# Patient Record
Sex: MALE | Race: WHITE | NOT HISPANIC OR LATINO | Employment: OTHER | URBAN - METROPOLITAN AREA
[De-identification: names, ages, dates, MRNs, and addresses within clinical notes are randomized per-mention and may not be internally consistent; named-entity substitution may affect disease eponyms.]

---

## 2017-01-01 ENCOUNTER — ANESTHESIA EVENT (OUTPATIENT)
Dept: PERIOP | Facility: HOSPITAL | Age: 77
End: 2017-01-01

## 2017-01-01 ENCOUNTER — APPOINTMENT (OUTPATIENT)
Dept: GENERAL RADIOLOGY | Facility: HOSPITAL | Age: 77
End: 2017-01-01

## 2017-01-01 ENCOUNTER — APPOINTMENT (OUTPATIENT)
Dept: CARDIOLOGY | Facility: HOSPITAL | Age: 77
End: 2017-01-01
Attending: INTERNAL MEDICINE

## 2017-01-01 ENCOUNTER — ANESTHESIA (OUTPATIENT)
Dept: PERIOP | Facility: HOSPITAL | Age: 77
End: 2017-01-01

## 2017-01-01 ENCOUNTER — APPOINTMENT (OUTPATIENT)
Dept: CT IMAGING | Facility: HOSPITAL | Age: 77
End: 2017-01-01

## 2017-01-01 ENCOUNTER — APPOINTMENT (OUTPATIENT)
Dept: NUCLEAR MEDICINE | Facility: HOSPITAL | Age: 77
End: 2017-01-01

## 2017-01-01 ENCOUNTER — HOSPITAL ENCOUNTER (EMERGENCY)
Facility: HOSPITAL | Age: 77
Discharge: HOME | End: 2017-08-05
Attending: EMERGENCY MEDICINE | Admitting: EMERGENCY MEDICINE

## 2017-01-01 ENCOUNTER — HOSPITAL ENCOUNTER (EMERGENCY)
Facility: HOSPITAL | Age: 77
Discharge: HOME | End: 2017-07-23
Attending: EMERGENCY MEDICINE | Admitting: EMERGENCY MEDICINE

## 2017-01-01 ENCOUNTER — HOSPITAL ENCOUNTER (INPATIENT)
Facility: HOSPITAL | Age: 77
LOS: 4 days | End: 2017-11-08
Attending: INTERNAL MEDICINE | Admitting: INTERNAL MEDICINE

## 2017-01-01 ENCOUNTER — HOSPITAL ENCOUNTER (INPATIENT)
Facility: HOSPITAL | Age: 77
LOS: 3 days | Discharge: NURSING, REHAB OR LONG-TERM CARE | End: 2017-08-15
Attending: EMERGENCY MEDICINE | Admitting: INTERNAL MEDICINE

## 2017-01-01 ENCOUNTER — HOSPITAL ENCOUNTER (EMERGENCY)
Facility: HOSPITAL | Age: 77
Discharge: ANOTHER HOSPITAL | End: 2017-08-06
Attending: EMERGENCY MEDICINE | Admitting: EMERGENCY MEDICINE

## 2017-01-01 ENCOUNTER — HOSPITAL ENCOUNTER (EMERGENCY)
Facility: HOSPITAL | Age: 77
Discharge: PSYCHIATRIC OR SUBSTANCE USE FACILITY | End: 2017-09-09
Attending: EMERGENCY MEDICINE | Admitting: EMERGENCY MEDICINE

## 2017-01-01 ENCOUNTER — APPOINTMENT (OUTPATIENT)
Dept: GENERAL RADIOLOGY | Facility: HOSPITAL | Age: 77
End: 2017-01-01
Attending: ORTHOPAEDIC SURGERY

## 2017-01-01 ENCOUNTER — HOSPITAL ENCOUNTER (INPATIENT)
Facility: HOSPITAL | Age: 77
LOS: 7 days | End: 2017-11-04
Attending: EMERGENCY MEDICINE | Admitting: INTERNAL MEDICINE

## 2017-01-01 ENCOUNTER — HOSPITAL ENCOUNTER (INPATIENT)
Facility: HOSPITAL | Age: 77
LOS: 10 days | Discharge: NURSING, REHAB OR LONG-TERM CARE | End: 2017-10-18
Attending: EMERGENCY MEDICINE | Admitting: HOSPITALIST

## 2017-01-01 ENCOUNTER — HOSPITAL ENCOUNTER (EMERGENCY)
Facility: HOSPITAL | Age: 77
Discharge: NURSING, REHAB OR LONG-TERM CARE | End: 2017-08-03
Attending: EMERGENCY MEDICINE | Admitting: EMERGENCY MEDICINE

## 2017-01-01 ENCOUNTER — HOSPITAL ENCOUNTER (EMERGENCY)
Facility: HOSPITAL | Age: 77
Discharge: HOME | End: 2017-07-25
Attending: EMERGENCY MEDICINE | Admitting: EMERGENCY MEDICINE

## 2017-01-01 VITALS
HEIGHT: 70 IN | RESPIRATION RATE: 18 BRPM | BODY MASS INDEX: 28.63 KG/M2 | HEART RATE: 95 BPM | SYSTOLIC BLOOD PRESSURE: 150 MMHG | OXYGEN SATURATION: 98 % | WEIGHT: 200 LBS | DIASTOLIC BLOOD PRESSURE: 106 MMHG | TEMPERATURE: 97.5 F

## 2017-01-01 VITALS
TEMPERATURE: 98 F | HEART RATE: 82 BPM | OXYGEN SATURATION: 84 % | SYSTOLIC BLOOD PRESSURE: 119 MMHG | BODY MASS INDEX: 24.66 KG/M2 | DIASTOLIC BLOOD PRESSURE: 78 MMHG | WEIGHT: 182.1 LBS | HEIGHT: 72 IN | RESPIRATION RATE: 16 BRPM

## 2017-01-01 VITALS
TEMPERATURE: 98.3 F | WEIGHT: 190 LBS | HEIGHT: 71 IN | BODY MASS INDEX: 26.6 KG/M2 | DIASTOLIC BLOOD PRESSURE: 79 MMHG | RESPIRATION RATE: 16 BRPM | OXYGEN SATURATION: 100 % | HEART RATE: 102 BPM | SYSTOLIC BLOOD PRESSURE: 137 MMHG

## 2017-01-01 VITALS
SYSTOLIC BLOOD PRESSURE: 149 MMHG | RESPIRATION RATE: 16 BRPM | OXYGEN SATURATION: 97 % | BODY MASS INDEX: 25.98 KG/M2 | HEART RATE: 77 BPM | TEMPERATURE: 98.1 F | HEIGHT: 72 IN | WEIGHT: 191.8 LBS | DIASTOLIC BLOOD PRESSURE: 83 MMHG

## 2017-01-01 VITALS
SYSTOLIC BLOOD PRESSURE: 152 MMHG | OXYGEN SATURATION: 96 % | HEART RATE: 74 BPM | BODY MASS INDEX: 23.59 KG/M2 | RESPIRATION RATE: 20 BRPM | TEMPERATURE: 97.6 F | DIASTOLIC BLOOD PRESSURE: 97 MMHG | HEIGHT: 72 IN | WEIGHT: 174.16 LBS

## 2017-01-01 VITALS
HEART RATE: 67 BPM | DIASTOLIC BLOOD PRESSURE: 82 MMHG | BODY MASS INDEX: 28.63 KG/M2 | SYSTOLIC BLOOD PRESSURE: 103 MMHG | HEIGHT: 70 IN | RESPIRATION RATE: 16 BRPM | OXYGEN SATURATION: 100 % | TEMPERATURE: 96.9 F | WEIGHT: 200 LBS

## 2017-01-01 VITALS
HEIGHT: 70 IN | DIASTOLIC BLOOD PRESSURE: 89 MMHG | HEART RATE: 90 BPM | SYSTOLIC BLOOD PRESSURE: 129 MMHG | BODY MASS INDEX: 28.63 KG/M2 | OXYGEN SATURATION: 97 % | WEIGHT: 200 LBS | TEMPERATURE: 97.9 F | RESPIRATION RATE: 18 BRPM

## 2017-01-01 VITALS
TEMPERATURE: 99.1 F | HEIGHT: 72 IN | OXYGEN SATURATION: 30 % | SYSTOLIC BLOOD PRESSURE: 81 MMHG | DIASTOLIC BLOOD PRESSURE: 45 MMHG

## 2017-01-01 VITALS
OXYGEN SATURATION: 94 % | TEMPERATURE: 97 F | RESPIRATION RATE: 16 BRPM | HEART RATE: 72 BPM | WEIGHT: 200 LBS | SYSTOLIC BLOOD PRESSURE: 126 MMHG | HEIGHT: 68 IN | DIASTOLIC BLOOD PRESSURE: 87 MMHG | BODY MASS INDEX: 30.31 KG/M2

## 2017-01-01 VITALS
SYSTOLIC BLOOD PRESSURE: 135 MMHG | DIASTOLIC BLOOD PRESSURE: 96 MMHG | WEIGHT: 200 LBS | RESPIRATION RATE: 16 BRPM | BODY MASS INDEX: 30.31 KG/M2 | TEMPERATURE: 96.7 F | HEIGHT: 68 IN | HEART RATE: 81 BPM | OXYGEN SATURATION: 98 %

## 2017-01-01 VITALS
OXYGEN SATURATION: 96 % | DIASTOLIC BLOOD PRESSURE: 95 MMHG | HEART RATE: 77 BPM | SYSTOLIC BLOOD PRESSURE: 145 MMHG | RESPIRATION RATE: 18 BRPM | TEMPERATURE: 97.7 F

## 2017-01-01 DIAGNOSIS — N39.0 URINARY TRACT INFECTION WITHOUT HEMATURIA, SITE UNSPECIFIED: ICD-10-CM

## 2017-01-01 DIAGNOSIS — S41.112A SKIN TEAR OF UPPER ARM WITHOUT COMPLICATION, LEFT, INITIAL ENCOUNTER: ICD-10-CM

## 2017-01-01 DIAGNOSIS — T07.XXXA MULTIPLE CONTUSIONS: ICD-10-CM
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
## 2017-01-01 DIAGNOSIS — F03.91 DEMENTIA WITH BEHAVIORAL DISTURBANCE, UNSPECIFIED DEMENTIA TYPE: Primary | ICD-10-CM

## 2017-01-01 DIAGNOSIS — N28.9 RENAL INSUFFICIENCY: ICD-10-CM

## 2017-01-01 DIAGNOSIS — J18.9 PNEUMONIA OF RIGHT LUNG DUE TO INFECTIOUS ORGANISM, UNSPECIFIED PART OF LUNG: ICD-10-CM

## 2017-01-01 DIAGNOSIS — A41.9 SEPSIS, DUE TO UNSPECIFIED ORGANISM: Primary | ICD-10-CM

## 2017-01-01 DIAGNOSIS — D62 ACUTE BLOOD LOSS ANEMIA: ICD-10-CM

## 2017-01-01 DIAGNOSIS — K92.2 ACUTE GI BLEEDING: Primary | ICD-10-CM

## 2017-01-01 DIAGNOSIS — R26.2 DIFFICULTY WALKING: ICD-10-CM

## 2017-01-01 DIAGNOSIS — S72.001A CLOSED FRACTURE OF RIGHT HIP, INITIAL ENCOUNTER (HCC): Primary | ICD-10-CM

## 2017-01-01 DIAGNOSIS — I48.20 ATRIAL FIBRILLATION, CHRONIC (HCC): ICD-10-CM

## 2017-01-01 DIAGNOSIS — I95.9 HYPOTENSION, UNSPECIFIED HYPOTENSION TYPE: ICD-10-CM
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
## 2017-01-01 DIAGNOSIS — F03.918: Primary | ICD-10-CM

## 2017-01-01 DIAGNOSIS — M54.6 THORACIC BACK PAIN, UNSPECIFIED BACK PAIN LATERALITY, UNSPECIFIED CHRONICITY: Primary | ICD-10-CM

## 2017-01-01 DIAGNOSIS — S61.511A TEAR OF SKIN OF RIGHT WRIST, INITIAL ENCOUNTER: ICD-10-CM

## 2017-01-01 DIAGNOSIS — S09.90XA CLOSED HEAD INJURY, INITIAL ENCOUNTER: ICD-10-CM

## 2017-01-01 LAB
ABO + RH BLD: NORMAL
ABO GROUP BLD: NORMAL
ALBUMIN SERPL-MCNC: 2.5 G/DL (ref 3.5–5.2)
ALBUMIN SERPL-MCNC: 2.6 G/DL (ref 3.5–5.2)
ALBUMIN SERPL-MCNC: 2.7 G/DL (ref 3.5–5.2)
ALBUMIN SERPL-MCNC: 3 G/DL (ref 3.5–5.2)
ALBUMIN SERPL-MCNC: 3.2 G/DL (ref 3.5–5.2)
ALBUMIN SERPL-MCNC: 3.4 G/DL (ref 3.5–5.2)
ALBUMIN SERPL-MCNC: 3.4 G/DL (ref 3.5–5.2)
ALBUMIN SERPL-MCNC: 3.6 G/DL (ref 3.5–5.2)
ALBUMIN/GLOB SERPL: 0.8 G/DL
ALBUMIN/GLOB SERPL: 0.9 G/DL
ALBUMIN/GLOB SERPL: 1 G/DL
ALBUMIN/GLOB SERPL: 1.1 G/DL
ALP SERPL-CCNC: 101 U/L (ref 39–117)
ALP SERPL-CCNC: 101 U/L (ref 39–117)
ALP SERPL-CCNC: 65 U/L (ref 39–117)
ALP SERPL-CCNC: 67 U/L (ref 39–117)
ALP SERPL-CCNC: 72 U/L (ref 39–117)
ALP SERPL-CCNC: 83 U/L (ref 39–117)
ALP SERPL-CCNC: 87 U/L (ref 39–117)
ALP SERPL-CCNC: 94 U/L (ref 39–117)
ALT SERPL W P-5'-P-CCNC: 10 U/L (ref 1–41)
ALT SERPL W P-5'-P-CCNC: 13 U/L (ref 1–41)
ALT SERPL W P-5'-P-CCNC: 14 U/L (ref 1–41)
ALT SERPL W P-5'-P-CCNC: 15 U/L (ref 1–41)
ALT SERPL W P-5'-P-CCNC: 18 U/L (ref 1–41)
ALT SERPL W P-5'-P-CCNC: 7 U/L (ref 1–41)
ALT SERPL W P-5'-P-CCNC: 8 U/L (ref 1–41)
ALT SERPL W P-5'-P-CCNC: 9 U/L (ref 1–41)
AMPHET+METHAMPHET UR QL: NEGATIVE
AMPHET+METHAMPHET UR QL: NEGATIVE
ANION GAP SERPL CALCULATED.3IONS-SCNC: 10.2 MMOL/L
ANION GAP SERPL CALCULATED.3IONS-SCNC: 10.2 MMOL/L
ANION GAP SERPL CALCULATED.3IONS-SCNC: 10.3 MMOL/L
ANION GAP SERPL CALCULATED.3IONS-SCNC: 10.5 MMOL/L
ANION GAP SERPL CALCULATED.3IONS-SCNC: 10.7 MMOL/L
ANION GAP SERPL CALCULATED.3IONS-SCNC: 11.2 MMOL/L
ANION GAP SERPL CALCULATED.3IONS-SCNC: 11.2 MMOL/L
ANION GAP SERPL CALCULATED.3IONS-SCNC: 11.4 MMOL/L
ANION GAP SERPL CALCULATED.3IONS-SCNC: 11.5 MMOL/L
ANION GAP SERPL CALCULATED.3IONS-SCNC: 11.6 MMOL/L
ANION GAP SERPL CALCULATED.3IONS-SCNC: 11.6 MMOL/L
ANION GAP SERPL CALCULATED.3IONS-SCNC: 11.7 MMOL/L
ANION GAP SERPL CALCULATED.3IONS-SCNC: 11.9 MMOL/L
ANION GAP SERPL CALCULATED.3IONS-SCNC: 12 MMOL/L
ANION GAP SERPL CALCULATED.3IONS-SCNC: 12.4 MMOL/L
ANION GAP SERPL CALCULATED.3IONS-SCNC: 13.1 MMOL/L
ANION GAP SERPL CALCULATED.3IONS-SCNC: 13.7 MMOL/L
ANION GAP SERPL CALCULATED.3IONS-SCNC: 14.2 MMOL/L
ANION GAP SERPL CALCULATED.3IONS-SCNC: 15.4 MMOL/L
ANION GAP SERPL CALCULATED.3IONS-SCNC: 7.7 MMOL/L
ANION GAP SERPL CALCULATED.3IONS-SCNC: 9 MMOL/L
ANION GAP SERPL CALCULATED.3IONS-SCNC: 9.7 MMOL/L
ANISOCYTOSIS BLD QL: NORMAL
AORTIC ARCH: 2 CM
APTT PPP: 32.8 SECONDS (ref 22.7–35.4)
APTT PPP: 32.8 SECONDS (ref 22.7–35.4)
ASCENDING AORTA: 3.3 CM
AST SERPL-CCNC: 11 U/L (ref 1–40)
AST SERPL-CCNC: 14 U/L (ref 1–40)
AST SERPL-CCNC: 14 U/L (ref 1–40)
AST SERPL-CCNC: 15 U/L (ref 1–40)
AST SERPL-CCNC: 15 U/L (ref 1–40)
AST SERPL-CCNC: 16 U/L (ref 1–40)
AST SERPL-CCNC: 16 U/L (ref 1–40)
AST SERPL-CCNC: 9 U/L (ref 1–40)
BACTERIA SPEC AEROBE CULT: ABNORMAL
BACTERIA SPEC AEROBE CULT: NO GROWTH
BACTERIA SPEC AEROBE CULT: NORMAL
BACTERIA SPEC AEROBE CULT: NORMAL
BACTERIA UR QL AUTO: ABNORMAL /HPF
BARBITURATES UR QL SCN: NEGATIVE
BARBITURATES UR QL SCN: NEGATIVE
BASOPHILS # BLD AUTO: 0.02 10*3/MM3 (ref 0–0.2)
BASOPHILS # BLD AUTO: 0.03 10*3/MM3 (ref 0–0.2)
BASOPHILS # BLD AUTO: 0.04 10*3/MM3 (ref 0–0.2)
BASOPHILS # BLD AUTO: 0.05 10*3/MM3 (ref 0–0.2)
BASOPHILS # BLD AUTO: 0.06 10*3/MM3 (ref 0–0.2)
BASOPHILS # BLD AUTO: 0.06 10*3/MM3 (ref 0–0.2)
BASOPHILS # BLD AUTO: 0.07 10*3/MM3 (ref 0–0.2)
BASOPHILS NFR BLD AUTO: 0.4 % (ref 0–1.5)
BASOPHILS NFR BLD AUTO: 0.5 % (ref 0–1.5)
BASOPHILS NFR BLD AUTO: 0.5 % (ref 0–1.5)
BASOPHILS NFR BLD AUTO: 0.6 % (ref 0–1.5)
BASOPHILS NFR BLD AUTO: 0.7 % (ref 0–1.5)
BASOPHILS NFR BLD AUTO: 0.7 % (ref 0–1.5)
BASOPHILS NFR BLD AUTO: 0.8 % (ref 0–1.5)
BASOPHILS NFR BLD AUTO: 0.9 % (ref 0–1.5)
BASOPHILS NFR BLD AUTO: 1.1 % (ref 0–1.5)
BASOPHILS NFR BLD AUTO: 1.1 % (ref 0–1.5)
BASOPHILS NFR BLD AUTO: 1.2 % (ref 0–1.5)
BENZODIAZ UR QL SCN: NEGATIVE
BENZODIAZ UR QL SCN: NEGATIVE
BH BB BLOOD EXPIRATION DATE: NORMAL
BH BB BLOOD TYPE BARCODE: 6200
BH BB DISPENSE STATUS: NORMAL
BH BB PRODUCT CODE: NORMAL
BH BB UNIT NUMBER: NORMAL
BH CV ECHO MEAS - ACS: 2.1 CM
BH CV ECHO MEAS - AO MAX PG: 9 MMHG
BH CV ECHO MEAS - AO MEAN PG (FULL): 1 MMHG
BH CV ECHO MEAS - AO MEAN PG: 4 MMHG
BH CV ECHO MEAS - AO ROOT AREA (BSA CORRECTED): 1.7
BH CV ECHO MEAS - AO ROOT AREA: 9.6 CM^2
BH CV ECHO MEAS - AO ROOT DIAM: 3.5 CM
BH CV ECHO MEAS - AO V2 MAX: 150 CM/SEC
BH CV ECHO MEAS - AO V2 MEAN: 97.5 CM/SEC
BH CV ECHO MEAS - AO V2 VTI: 25.3 CM
BH CV ECHO MEAS - ASC AORTA: 3.3 CM
BH CV ECHO MEAS - AVA(I,A): 2.8 CM^2
BH CV ECHO MEAS - AVA(I,D): 2.8 CM^2
BH CV ECHO MEAS - BSA(HAYCOCK): 2.1 M^2
BH CV ECHO MEAS - BSA: 2.1 M^2
BH CV ECHO MEAS - BZI_BMI: 26 KILOGRAMS/M^2
BH CV ECHO MEAS - BZI_METRIC_HEIGHT: 182.9 CM
BH CV ECHO MEAS - BZI_METRIC_WEIGHT: 87.1 KG
BH CV ECHO MEAS - CONTRAST EF (2CH): 62.2 ML/M^2
BH CV ECHO MEAS - CONTRAST EF 4CH: 59.6 ML/M^2
BH CV ECHO MEAS - EDV(CUBED): 103.8 ML
BH CV ECHO MEAS - EDV(MOD-SP2): 98 ML
BH CV ECHO MEAS - EDV(MOD-SP4): 94 ML
BH CV ECHO MEAS - EDV(TEICH): 102.4 ML
BH CV ECHO MEAS - EF(CUBED): 74 %
BH CV ECHO MEAS - EF(MOD-SP2): 62.2 %
BH CV ECHO MEAS - EF(MOD-SP4): 59.6 %
BH CV ECHO MEAS - EF(TEICH): 65.8 %
BH CV ECHO MEAS - ESV(CUBED): 27 ML
BH CV ECHO MEAS - ESV(MOD-SP2): 37 ML
BH CV ECHO MEAS - ESV(MOD-SP4): 38 ML
BH CV ECHO MEAS - ESV(TEICH): 35 ML
BH CV ECHO MEAS - FS: 36.2 %
BH CV ECHO MEAS - IVS/LVPW: 1.2
BH CV ECHO MEAS - IVSD: 1.3 CM
BH CV ECHO MEAS - LAT PEAK E' VEL: 8 CM/SEC
BH CV ECHO MEAS - LV DIASTOLIC VOL/BSA (35-75): 44.9 ML/M^2
BH CV ECHO MEAS - LV MASS(C)D: 212 GRAMS
BH CV ECHO MEAS - LV MASS(C)DI: 101.3 GRAMS/M^2
BH CV ECHO MEAS - LV MAX PG: 6 MMHG
BH CV ECHO MEAS - LV MEAN PG: 3 MMHG
BH CV ECHO MEAS - LV SYSTOLIC VOL/BSA (12-30): 18.1 ML/M^2
BH CV ECHO MEAS - LV V1 MAX: 119 CM/SEC
BH CV ECHO MEAS - LV V1 MEAN: 73.3 CM/SEC
BH CV ECHO MEAS - LV V1 VTI: 16.8 CM
BH CV ECHO MEAS - LVIDD: 4.7 CM
BH CV ECHO MEAS - LVIDS: 3 CM
BH CV ECHO MEAS - LVLD AP2: 7.6 CM
BH CV ECHO MEAS - LVLD AP4: 7.7 CM
BH CV ECHO MEAS - LVLS AP2: 6.8 CM
BH CV ECHO MEAS - LVLS AP4: 7.1 CM
BH CV ECHO MEAS - LVOT AREA (M): 4.2 CM^2
BH CV ECHO MEAS - LVOT AREA: 4.2 CM^2
BH CV ECHO MEAS - LVOT DIAM: 2.3 CM
BH CV ECHO MEAS - LVPWD: 1.1 CM
BH CV ECHO MEAS - MED PEAK E' VEL: 6 CM/SEC
BH CV ECHO MEAS - MV A DUR: 0.12 SEC
BH CV ECHO MEAS - MV A MAX VEL: 72.1 CM/SEC
BH CV ECHO MEAS - MV DEC SLOPE: 737 CM/SEC^2
BH CV ECHO MEAS - MV DEC TIME: 0.19 SEC
BH CV ECHO MEAS - MV E MAX VEL: 163 CM/SEC
BH CV ECHO MEAS - MV E/A: 2.3
BH CV ECHO MEAS - MV MAX PG: 11 MMHG
BH CV ECHO MEAS - MV MEAN PG: 4 MMHG
BH CV ECHO MEAS - MV P1/2T MAX VEL: 174 CM/SEC
BH CV ECHO MEAS - MV P1/2T: 69.1 MSEC
BH CV ECHO MEAS - MV V2 MEAN: 94.7 CM/SEC
BH CV ECHO MEAS - MV V2 VTI: 33.8 CM
BH CV ECHO MEAS - MVA P1/2T LCG: 1.3 CM^2
BH CV ECHO MEAS - MVA(P1/2T): 3.2 CM^2
BH CV ECHO MEAS - MVA(VTI): 2.1 CM^2
BH CV ECHO MEAS - PA ACC SLOPE: 36.7 CM/SEC^2
BH CV ECHO MEAS - PA ACC TIME: 0.07 SEC
BH CV ECHO MEAS - PA MAX PG (FULL): 2.5 MMHG
BH CV ECHO MEAS - PA MAX PG: 5.2 MMHG
BH CV ECHO MEAS - PA PR(ACCEL): 45.7 MMHG
BH CV ECHO MEAS - PA V2 MAX: 114 CM/SEC
BH CV ECHO MEAS - PULM DIAS VEL: 94.4 CM/SEC
BH CV ECHO MEAS - PULM S/D: 0.48
BH CV ECHO MEAS - PULM SYS VEL: 45.5 CM/SEC
BH CV ECHO MEAS - PVA(V,A): 3.5 CM^2
BH CV ECHO MEAS - PVA(V,D): 3.5 CM^2
BH CV ECHO MEAS - QP/QS: 1
BH CV ECHO MEAS - RAP SYSTOLE: 3 MMHG
BH CV ECHO MEAS - RV MAX PG: 2.7 MMHG
BH CV ECHO MEAS - RV MEAN PG: 2 MMHG
BH CV ECHO MEAS - RV V1 MAX: 82 CM/SEC
BH CV ECHO MEAS - RV V1 MEAN: 58 CM/SEC
BH CV ECHO MEAS - RV V1 VTI: 14.3 CM
BH CV ECHO MEAS - RVOT AREA: 4.9 CM^2
BH CV ECHO MEAS - RVOT DIAM: 2.5 CM
BH CV ECHO MEAS - RVSP: 34 MMHG
BH CV ECHO MEAS - SI(AO): 116.3 ML/M^2
BH CV ECHO MEAS - SI(CUBED): 36.7 ML/M^2
BH CV ECHO MEAS - SI(LVOT): 33.3 ML/M^2
BH CV ECHO MEAS - SI(MOD-SP2): 29.1 ML/M^2
BH CV ECHO MEAS - SI(MOD-SP4): 26.7 ML/M^2
BH CV ECHO MEAS - SI(TEICH): 32.2 ML/M^2
BH CV ECHO MEAS - SUP REN AO DIAM: 1.9 CM
BH CV ECHO MEAS - SV(AO): 243.4 ML
BH CV ECHO MEAS - SV(CUBED): 76.8 ML
BH CV ECHO MEAS - SV(LVOT): 69.8 ML
BH CV ECHO MEAS - SV(MOD-SP2): 61 ML
BH CV ECHO MEAS - SV(MOD-SP4): 56 ML
BH CV ECHO MEAS - SV(RVOT): 70.2 ML
BH CV ECHO MEAS - SV(TEICH): 67.4 ML
BH CV ECHO MEAS - TAPSE (>1.6): 1.8 CM2
BH CV VAS BP RIGHT ARM: NORMAL MMHG
BH CV XLRA - RV BASE: 3.3 CM
BH CV XLRA - TDI S': 19 CM/SEC
BILIRUB SERPL-MCNC: 0.2 MG/DL (ref 0.1–1.2)
BILIRUB SERPL-MCNC: 0.3 MG/DL (ref 0.1–1.2)
BILIRUB SERPL-MCNC: 0.4 MG/DL (ref 0.1–1.2)
BILIRUB UR QL STRIP: NEGATIVE
BLD GP AB SCN SERPL QL: NEGATIVE
BUN BLD-MCNC: 14 MG/DL (ref 8–23)
BUN BLD-MCNC: 14 MG/DL (ref 8–23)
BUN BLD-MCNC: 15 MG/DL (ref 8–23)
BUN BLD-MCNC: 15 MG/DL (ref 8–23)
BUN BLD-MCNC: 16 MG/DL (ref 8–23)
BUN BLD-MCNC: 16 MG/DL (ref 8–23)
BUN BLD-MCNC: 17 MG/DL (ref 8–23)
BUN BLD-MCNC: 19 MG/DL (ref 8–23)
BUN BLD-MCNC: 20 MG/DL (ref 8–23)
BUN BLD-MCNC: 21 MG/DL (ref 8–23)
BUN BLD-MCNC: 22 MG/DL (ref 8–23)
BUN BLD-MCNC: 23 MG/DL (ref 8–23)
BUN BLD-MCNC: 23 MG/DL (ref 8–23)
BUN BLD-MCNC: 26 MG/DL (ref 8–23)
BUN BLD-MCNC: 27 MG/DL (ref 8–23)
BUN BLD-MCNC: 28 MG/DL (ref 8–23)
BUN BLD-MCNC: 31 MG/DL (ref 8–23)
BUN BLD-MCNC: 33 MG/DL (ref 8–23)
BUN BLD-MCNC: 33 MG/DL (ref 8–23)
BUN BLD-MCNC: 36 MG/DL (ref 8–23)
BUN/CREAT SERPL: 10.1 (ref 7–25)
BUN/CREAT SERPL: 10.1 (ref 7–25)
BUN/CREAT SERPL: 11.3 (ref 7–25)
BUN/CREAT SERPL: 12.1 (ref 7–25)
BUN/CREAT SERPL: 12.4 (ref 7–25)
BUN/CREAT SERPL: 12.9 (ref 7–25)
BUN/CREAT SERPL: 13.3 (ref 7–25)
BUN/CREAT SERPL: 14 (ref 7–25)
BUN/CREAT SERPL: 14.1 (ref 7–25)
BUN/CREAT SERPL: 14.7 (ref 7–25)
BUN/CREAT SERPL: 15.3 (ref 7–25)
BUN/CREAT SERPL: 17.2 (ref 7–25)
BUN/CREAT SERPL: 18.3 (ref 7–25)
BUN/CREAT SERPL: 18.6 (ref 7–25)
BUN/CREAT SERPL: 18.8 (ref 7–25)
BUN/CREAT SERPL: 19.2 (ref 7–25)
BUN/CREAT SERPL: 19.9 (ref 7–25)
BUN/CREAT SERPL: 21.9 (ref 7–25)
BUN/CREAT SERPL: 22 (ref 7–25)
BUN/CREAT SERPL: 22.1 (ref 7–25)
BUN/CREAT SERPL: 23.6 (ref 7–25)
BUN/CREAT SERPL: 24.1 (ref 7–25)
CALCIUM SPEC-SCNC: 10 MG/DL (ref 8.6–10.5)
CALCIUM SPEC-SCNC: 10 MG/DL (ref 8.6–10.5)
CALCIUM SPEC-SCNC: 10.1 MG/DL (ref 8.6–10.5)
CALCIUM SPEC-SCNC: 10.2 MG/DL (ref 8.6–10.5)
CALCIUM SPEC-SCNC: 10.4 MG/DL (ref 8.6–10.5)
CALCIUM SPEC-SCNC: 10.5 MG/DL (ref 8.6–10.5)
CALCIUM SPEC-SCNC: 10.8 MG/DL (ref 8.6–10.5)
CALCIUM SPEC-SCNC: 8.5 MG/DL (ref 8.6–10.5)
CALCIUM SPEC-SCNC: 8.7 MG/DL (ref 8.6–10.5)
CALCIUM SPEC-SCNC: 8.9 MG/DL (ref 8.6–10.5)
CALCIUM SPEC-SCNC: 9.2 MG/DL (ref 8.6–10.5)
CALCIUM SPEC-SCNC: 9.2 MG/DL (ref 8.6–10.5)
CALCIUM SPEC-SCNC: 9.3 MG/DL (ref 8.6–10.5)
CALCIUM SPEC-SCNC: 9.3 MG/DL (ref 8.6–10.5)
CALCIUM SPEC-SCNC: 9.4 MG/DL (ref 8.6–10.5)
CALCIUM SPEC-SCNC: 9.6 MG/DL (ref 8.6–10.5)
CALCIUM SPEC-SCNC: 9.7 MG/DL (ref 8.6–10.5)
CALCIUM SPEC-SCNC: 9.8 MG/DL (ref 8.6–10.5)
CALCIUM SPEC-SCNC: 9.8 MG/DL (ref 8.6–10.5)
CALCIUM SPEC-SCNC: 9.9 MG/DL (ref 8.6–10.5)
CANNABINOIDS SERPL QL: NEGATIVE
CANNABINOIDS SERPL QL: NEGATIVE
CEA SERPL-MCNC: 13.3 NG/ML
CHLORIDE SERPL-SCNC: 104 MMOL/L (ref 98–107)
CHLORIDE SERPL-SCNC: 105 MMOL/L (ref 98–107)
CHLORIDE SERPL-SCNC: 105 MMOL/L (ref 98–107)
CHLORIDE SERPL-SCNC: 106 MMOL/L (ref 98–107)
CHLORIDE SERPL-SCNC: 107 MMOL/L (ref 98–107)
CHLORIDE SERPL-SCNC: 107 MMOL/L (ref 98–107)
CHLORIDE SERPL-SCNC: 108 MMOL/L (ref 98–107)
CHLORIDE SERPL-SCNC: 109 MMOL/L (ref 98–107)
CHLORIDE SERPL-SCNC: 110 MMOL/L (ref 98–107)
CHLORIDE SERPL-SCNC: 111 MMOL/L (ref 98–107)
CHLORIDE SERPL-SCNC: 112 MMOL/L (ref 98–107)
CHLORIDE SERPL-SCNC: 114 MMOL/L (ref 98–107)
CHLORIDE SERPL-SCNC: 115 MMOL/L (ref 98–107)
CHLORIDE SERPL-SCNC: 115 MMOL/L (ref 98–107)
CHLORIDE SERPL-SCNC: 116 MMOL/L (ref 98–107)
CHLORIDE SERPL-SCNC: 117 MMOL/L (ref 98–107)
CHLORIDE SERPL-SCNC: 117 MMOL/L (ref 98–107)
CK SERPL-CCNC: 30 U/L (ref 20–200)
CLARITY UR: ABNORMAL
CLARITY UR: ABNORMAL
CLARITY UR: CLEAR
CLARITY UR: CLEAR
CO2 SERPL-SCNC: 17.8 MMOL/L (ref 22–29)
CO2 SERPL-SCNC: 18.5 MMOL/L (ref 22–29)
CO2 SERPL-SCNC: 19 MMOL/L (ref 22–29)
CO2 SERPL-SCNC: 19.4 MMOL/L (ref 22–29)
CO2 SERPL-SCNC: 19.7 MMOL/L (ref 22–29)
CO2 SERPL-SCNC: 19.8 MMOL/L (ref 22–29)
CO2 SERPL-SCNC: 20.3 MMOL/L (ref 22–29)
CO2 SERPL-SCNC: 20.5 MMOL/L (ref 22–29)
CO2 SERPL-SCNC: 20.6 MMOL/L (ref 22–29)
CO2 SERPL-SCNC: 21.1 MMOL/L (ref 22–29)
CO2 SERPL-SCNC: 21.3 MMOL/L (ref 22–29)
CO2 SERPL-SCNC: 21.3 MMOL/L (ref 22–29)
CO2 SERPL-SCNC: 22.4 MMOL/L (ref 22–29)
CO2 SERPL-SCNC: 22.8 MMOL/L (ref 22–29)
CO2 SERPL-SCNC: 22.9 MMOL/L (ref 22–29)
CO2 SERPL-SCNC: 23.6 MMOL/L (ref 22–29)
CO2 SERPL-SCNC: 23.6 MMOL/L (ref 22–29)
CO2 SERPL-SCNC: 23.8 MMOL/L (ref 22–29)
CO2 SERPL-SCNC: 25 MMOL/L (ref 22–29)
CO2 SERPL-SCNC: 25.3 MMOL/L (ref 22–29)
CO2 SERPL-SCNC: 27.3 MMOL/L (ref 22–29)
CO2 SERPL-SCNC: 28.8 MMOL/L (ref 22–29)
COCAINE UR QL: NEGATIVE
COCAINE UR QL: NEGATIVE
COLOR UR: YELLOW
CREAT BLD-MCNC: 1.05 MG/DL (ref 0.76–1.27)
CREAT BLD-MCNC: 1.09 MG/DL (ref 0.76–1.27)
CREAT BLD-MCNC: 1.1 MG/DL (ref 0.76–1.27)
CREAT BLD-MCNC: 1.18 MG/DL (ref 0.76–1.27)
CREAT BLD-MCNC: 1.21 MG/DL (ref 0.76–1.27)
CREAT BLD-MCNC: 1.24 MG/DL (ref 0.76–1.27)
CREAT BLD-MCNC: 1.26 MG/DL (ref 0.76–1.27)
CREAT BLD-MCNC: 1.28 MG/DL (ref 0.76–1.27)
CREAT BLD-MCNC: 1.31 MG/DL (ref 0.76–1.27)
CREAT BLD-MCNC: 1.36 MG/DL (ref 0.76–1.27)
CREAT BLD-MCNC: 1.37 MG/DL (ref 0.76–1.27)
CREAT BLD-MCNC: 1.39 MG/DL (ref 0.76–1.27)
CREAT BLD-MCNC: 1.4 MG/DL (ref 0.76–1.27)
CREAT BLD-MCNC: 1.43 MG/DL (ref 0.76–1.27)
CREAT BLD-MCNC: 1.46 MG/DL (ref 0.76–1.27)
CREAT BLD-MCNC: 1.49 MG/DL (ref 0.76–1.27)
CREAT BLD-MCNC: 1.5 MG/DL (ref 0.76–1.27)
CREAT BLD-MCNC: 1.57 MG/DL (ref 0.76–1.27)
CREAT BLD-MCNC: 1.68 MG/DL (ref 0.76–1.27)
CREAT BLD-MCNC: 1.92 MG/DL (ref 0.76–1.27)
D DIMER PPP FEU-MCNC: 2.38 MCGFEU/ML (ref 0–0.49)
D-LACTATE SERPL-SCNC: 1.5 MMOL/L (ref 0.5–2)
D-LACTATE SERPL-SCNC: 3.1 MMOL/L (ref 0.5–2)
DEPRECATED RDW RBC AUTO: 47.8 FL (ref 37–54)
DEPRECATED RDW RBC AUTO: 47.8 FL (ref 37–54)
DEPRECATED RDW RBC AUTO: 47.9 FL (ref 37–54)
DEPRECATED RDW RBC AUTO: 48 FL (ref 37–54)
DEPRECATED RDW RBC AUTO: 48.5 FL (ref 37–54)
DEPRECATED RDW RBC AUTO: 48.7 FL (ref 37–54)
DEPRECATED RDW RBC AUTO: 49.1 FL (ref 37–54)
DEPRECATED RDW RBC AUTO: 52 FL (ref 37–54)
DEPRECATED RDW RBC AUTO: 52.6 FL (ref 37–54)
DEPRECATED RDW RBC AUTO: 53.5 FL (ref 37–54)
DEPRECATED RDW RBC AUTO: 53.5 FL (ref 37–54)
DEPRECATED RDW RBC AUTO: 53.7 FL (ref 37–54)
DEPRECATED RDW RBC AUTO: 54 FL (ref 37–54)
DEPRECATED RDW RBC AUTO: 54 FL (ref 37–54)
DEPRECATED RDW RBC AUTO: 54.5 FL (ref 37–54)
DEPRECATED RDW RBC AUTO: 54.7 FL (ref 37–54)
DEPRECATED RDW RBC AUTO: 55.1 FL (ref 37–54)
DEPRECATED RDW RBC AUTO: 55.2 FL (ref 37–54)
DEPRECATED RDW RBC AUTO: 56.2 FL (ref 37–54)
DEPRECATED RDW RBC AUTO: 56.6 FL (ref 37–54)
DEPRECATED RDW RBC AUTO: 57.1 FL (ref 37–54)
DEPRECATED RDW RBC AUTO: 57.3 FL (ref 37–54)
DEPRECATED RDW RBC AUTO: 57.3 FL (ref 37–54)
DEPRECATED RDW RBC AUTO: 58.6 FL (ref 37–54)
DEPRECATED RDW RBC AUTO: 60.5 FL (ref 37–54)
E/E' RATIO: 25
EOSINOPHIL # BLD AUTO: 0.02 10*3/MM3 (ref 0–0.7)
EOSINOPHIL # BLD AUTO: 0.04 10*3/MM3 (ref 0–0.7)
EOSINOPHIL # BLD AUTO: 0.05 10*3/MM3 (ref 0–0.7)
EOSINOPHIL # BLD AUTO: 0.05 10*3/MM3 (ref 0–0.7)
EOSINOPHIL # BLD AUTO: 0.06 10*3/MM3 (ref 0–0.7)
EOSINOPHIL # BLD AUTO: 0.06 10*3/MM3 (ref 0–0.7)
EOSINOPHIL # BLD AUTO: 0.07 10*3/MM3 (ref 0–0.7)
EOSINOPHIL # BLD AUTO: 0.11 10*3/MM3 (ref 0–0.7)
EOSINOPHIL # BLD AUTO: 0.13 10*3/MM3 (ref 0–0.7)
EOSINOPHIL # BLD AUTO: 0.14 10*3/MM3 (ref 0–0.7)
EOSINOPHIL # BLD AUTO: 0.15 10*3/MM3 (ref 0–0.7)
EOSINOPHIL # BLD AUTO: 0.16 10*3/MM3 (ref 0–0.7)
EOSINOPHIL # BLD AUTO: 0.17 10*3/MM3 (ref 0–0.7)
EOSINOPHIL # BLD AUTO: 0.2 10*3/MM3 (ref 0–0.7)
EOSINOPHIL NFR BLD AUTO: 0.3 % (ref 0.3–6.2)
EOSINOPHIL NFR BLD AUTO: 0.7 % (ref 0.3–6.2)
EOSINOPHIL NFR BLD AUTO: 0.8 % (ref 0.3–6.2)
EOSINOPHIL NFR BLD AUTO: 0.9 % (ref 0.3–6.2)
EOSINOPHIL NFR BLD AUTO: 1.1 % (ref 0.3–6.2)
EOSINOPHIL NFR BLD AUTO: 1.1 % (ref 0.3–6.2)
EOSINOPHIL NFR BLD AUTO: 1.3 % (ref 0.3–6.2)
EOSINOPHIL NFR BLD AUTO: 1.5 % (ref 0.3–6.2)
EOSINOPHIL NFR BLD AUTO: 2.6 % (ref 0.3–6.2)
EOSINOPHIL NFR BLD AUTO: 2.7 % (ref 0.3–6.2)
EOSINOPHIL NFR BLD AUTO: 3 % (ref 0.3–6.2)
EOSINOPHIL NFR BLD AUTO: 3.2 % (ref 0.3–6.2)
EOSINOPHIL NFR BLD AUTO: 4 % (ref 0.3–6.2)
EOSINOPHIL NFR BLD AUTO: 4.5 % (ref 0.3–6.2)
ERYTHROCYTE [DISTWIDTH] IN BLOOD BY AUTOMATED COUNT: 16.7 % (ref 11.5–14.5)
ERYTHROCYTE [DISTWIDTH] IN BLOOD BY AUTOMATED COUNT: 16.8 % (ref 11.5–14.5)
ERYTHROCYTE [DISTWIDTH] IN BLOOD BY AUTOMATED COUNT: 16.9 % (ref 11.5–14.5)
ERYTHROCYTE [DISTWIDTH] IN BLOOD BY AUTOMATED COUNT: 17.1 % (ref 11.5–14.5)
ERYTHROCYTE [DISTWIDTH] IN BLOOD BY AUTOMATED COUNT: 17.2 % (ref 11.5–14.5)
ERYTHROCYTE [DISTWIDTH] IN BLOOD BY AUTOMATED COUNT: 17.2 % (ref 11.5–14.5)
ERYTHROCYTE [DISTWIDTH] IN BLOOD BY AUTOMATED COUNT: 17.3 % (ref 11.5–14.5)
ERYTHROCYTE [DISTWIDTH] IN BLOOD BY AUTOMATED COUNT: 17.4 % (ref 11.5–14.5)
ERYTHROCYTE [DISTWIDTH] IN BLOOD BY AUTOMATED COUNT: 17.6 % (ref 11.5–14.5)
ERYTHROCYTE [DISTWIDTH] IN BLOOD BY AUTOMATED COUNT: 17.6 % (ref 11.5–14.5)
ERYTHROCYTE [DISTWIDTH] IN BLOOD BY AUTOMATED COUNT: 17.7 % (ref 11.5–14.5)
ERYTHROCYTE [DISTWIDTH] IN BLOOD BY AUTOMATED COUNT: 17.8 % (ref 11.5–14.5)
ERYTHROCYTE [DISTWIDTH] IN BLOOD BY AUTOMATED COUNT: 17.8 % (ref 11.5–14.5)
ERYTHROCYTE [DISTWIDTH] IN BLOOD BY AUTOMATED COUNT: 17.9 % (ref 11.5–14.5)
ERYTHROCYTE [DISTWIDTH] IN BLOOD BY AUTOMATED COUNT: 17.9 % (ref 11.5–14.5)
ERYTHROCYTE [DISTWIDTH] IN BLOOD BY AUTOMATED COUNT: 18.5 % (ref 11.5–14.5)
ERYTHROCYTE [DISTWIDTH] IN BLOOD BY AUTOMATED COUNT: 18.6 % (ref 11.5–14.5)
ERYTHROCYTE [DISTWIDTH] IN BLOOD BY AUTOMATED COUNT: 18.6 % (ref 11.5–14.5)
ERYTHROCYTE [DISTWIDTH] IN BLOOD BY AUTOMATED COUNT: 18.8 % (ref 11.5–14.5)
ERYTHROCYTE [DISTWIDTH] IN BLOOD BY AUTOMATED COUNT: 18.8 % (ref 11.5–14.5)
ERYTHROCYTE [DISTWIDTH] IN BLOOD BY AUTOMATED COUNT: 19 % (ref 11.5–14.5)
ERYTHROCYTE [DISTWIDTH] IN BLOOD BY AUTOMATED COUNT: 19.3 % (ref 11.5–14.5)
ETHANOL BLD-MCNC: <10 MG/DL (ref 0–10)
ETHANOL BLD-MCNC: <10 MG/DL (ref 0–10)
ETHANOL UR QL: <0.01 %
ETHANOL UR QL: <0.01 %
FERRITIN SERPL-MCNC: 30.55 NG/ML (ref 30–400)
FERRITIN SERPL-MCNC: 76.54 NG/ML (ref 30–400)
FOLATE SERPL-MCNC: 13.62 NG/ML (ref 4.78–24.2)
FOLATE SERPL-MCNC: 16.63 NG/ML (ref 4.78–24.2)
GFR SERPL CREATININE-BSD FRML MDRD: 34 ML/MIN/1.73
GFR SERPL CREATININE-BSD FRML MDRD: 40 ML/MIN/1.73
GFR SERPL CREATININE-BSD FRML MDRD: 43 ML/MIN/1.73
GFR SERPL CREATININE-BSD FRML MDRD: 45 ML/MIN/1.73
GFR SERPL CREATININE-BSD FRML MDRD: 45 ML/MIN/1.73
GFR SERPL CREATININE-BSD FRML MDRD: 47 ML/MIN/1.73
GFR SERPL CREATININE-BSD FRML MDRD: 48 ML/MIN/1.73
GFR SERPL CREATININE-BSD FRML MDRD: 49 ML/MIN/1.73
GFR SERPL CREATININE-BSD FRML MDRD: 49 ML/MIN/1.73
GFR SERPL CREATININE-BSD FRML MDRD: 50 ML/MIN/1.73
GFR SERPL CREATININE-BSD FRML MDRD: 51 ML/MIN/1.73
GFR SERPL CREATININE-BSD FRML MDRD: 53 ML/MIN/1.73
GFR SERPL CREATININE-BSD FRML MDRD: 54 ML/MIN/1.73
GFR SERPL CREATININE-BSD FRML MDRD: 55 ML/MIN/1.73
GFR SERPL CREATININE-BSD FRML MDRD: 56 ML/MIN/1.73
GFR SERPL CREATININE-BSD FRML MDRD: 58 ML/MIN/1.73
GFR SERPL CREATININE-BSD FRML MDRD: 60 ML/MIN/1.73
GFR SERPL CREATININE-BSD FRML MDRD: 65 ML/MIN/1.73
GFR SERPL CREATININE-BSD FRML MDRD: 65 ML/MIN/1.73
GFR SERPL CREATININE-BSD FRML MDRD: 68 ML/MIN/1.73
GLOBULIN UR ELPH-MCNC: 2.9 GM/DL
GLOBULIN UR ELPH-MCNC: 3.1 GM/DL
GLOBULIN UR ELPH-MCNC: 3.1 GM/DL
GLOBULIN UR ELPH-MCNC: 3.2 GM/DL
GLOBULIN UR ELPH-MCNC: 3.3 GM/DL
GLOBULIN UR ELPH-MCNC: 3.4 GM/DL
GLOBULIN UR ELPH-MCNC: 3.7 GM/DL
GLOBULIN UR ELPH-MCNC: 4 GM/DL
GLUCOSE BLD-MCNC: 100 MG/DL (ref 65–99)
GLUCOSE BLD-MCNC: 104 MG/DL (ref 65–99)
GLUCOSE BLD-MCNC: 109 MG/DL (ref 65–99)
GLUCOSE BLD-MCNC: 111 MG/DL (ref 65–99)
GLUCOSE BLD-MCNC: 111 MG/DL (ref 65–99)
GLUCOSE BLD-MCNC: 112 MG/DL (ref 65–99)
GLUCOSE BLD-MCNC: 119 MG/DL (ref 65–99)
GLUCOSE BLD-MCNC: 123 MG/DL (ref 65–99)
GLUCOSE BLD-MCNC: 124 MG/DL (ref 65–99)
GLUCOSE BLD-MCNC: 125 MG/DL (ref 65–99)
GLUCOSE BLD-MCNC: 127 MG/DL (ref 65–99)
GLUCOSE BLD-MCNC: 147 MG/DL (ref 65–99)
GLUCOSE BLD-MCNC: 158 MG/DL (ref 65–99)
GLUCOSE BLD-MCNC: 74 MG/DL (ref 65–99)
GLUCOSE BLD-MCNC: 86 MG/DL (ref 65–99)
GLUCOSE BLD-MCNC: 89 MG/DL (ref 65–99)
GLUCOSE BLD-MCNC: 89 MG/DL (ref 65–99)
GLUCOSE BLD-MCNC: 95 MG/DL (ref 65–99)
GLUCOSE BLD-MCNC: 96 MG/DL (ref 65–99)
GLUCOSE BLD-MCNC: 97 MG/DL (ref 65–99)
GLUCOSE BLDC GLUCOMTR-MCNC: 102 MG/DL (ref 70–130)
GLUCOSE BLDC GLUCOMTR-MCNC: 105 MG/DL (ref 70–130)
GLUCOSE BLDC GLUCOMTR-MCNC: 107 MG/DL (ref 70–130)
GLUCOSE BLDC GLUCOMTR-MCNC: 107 MG/DL (ref 70–130)
GLUCOSE BLDC GLUCOMTR-MCNC: 109 MG/DL (ref 70–130)
GLUCOSE BLDC GLUCOMTR-MCNC: 110 MG/DL (ref 70–130)
GLUCOSE BLDC GLUCOMTR-MCNC: 111 MG/DL (ref 70–130)
GLUCOSE BLDC GLUCOMTR-MCNC: 115 MG/DL (ref 70–130)
GLUCOSE BLDC GLUCOMTR-MCNC: 117 MG/DL (ref 70–130)
GLUCOSE BLDC GLUCOMTR-MCNC: 119 MG/DL (ref 70–130)
GLUCOSE BLDC GLUCOMTR-MCNC: 126 MG/DL (ref 70–130)
GLUCOSE BLDC GLUCOMTR-MCNC: 132 MG/DL (ref 70–130)
GLUCOSE BLDC GLUCOMTR-MCNC: 132 MG/DL (ref 70–130)
GLUCOSE BLDC GLUCOMTR-MCNC: 143 MG/DL (ref 70–130)
GLUCOSE BLDC GLUCOMTR-MCNC: 146 MG/DL (ref 70–130)
GLUCOSE BLDC GLUCOMTR-MCNC: 146 MG/DL (ref 70–130)
GLUCOSE BLDC GLUCOMTR-MCNC: 148 MG/DL (ref 70–130)
GLUCOSE BLDC GLUCOMTR-MCNC: 157 MG/DL (ref 70–130)
GLUCOSE BLDC GLUCOMTR-MCNC: 163 MG/DL (ref 70–130)
GLUCOSE BLDC GLUCOMTR-MCNC: 178 MG/DL (ref 70–130)
GLUCOSE BLDC GLUCOMTR-MCNC: 198 MG/DL (ref 70–130)
GLUCOSE BLDC GLUCOMTR-MCNC: 78 MG/DL (ref 70–130)
GLUCOSE BLDC GLUCOMTR-MCNC: 84 MG/DL (ref 70–130)
GLUCOSE BLDC GLUCOMTR-MCNC: 85 MG/DL (ref 70–130)
GLUCOSE BLDC GLUCOMTR-MCNC: 85 MG/DL (ref 70–130)
GLUCOSE BLDC GLUCOMTR-MCNC: 86 MG/DL (ref 70–130)
GLUCOSE BLDC GLUCOMTR-MCNC: 88 MG/DL (ref 70–130)
GLUCOSE BLDC GLUCOMTR-MCNC: 88 MG/DL (ref 70–130)
GLUCOSE BLDC GLUCOMTR-MCNC: 89 MG/DL (ref 70–130)
GLUCOSE BLDC GLUCOMTR-MCNC: 89 MG/DL (ref 70–130)
GLUCOSE BLDC GLUCOMTR-MCNC: 95 MG/DL (ref 70–130)
GLUCOSE UR STRIP-MCNC: NEGATIVE MG/DL
HBA1C MFR BLD: 5 % (ref 4.8–5.6)
HBA1C MFR BLD: 5.09 % (ref 4.8–5.6)
HCT VFR BLD AUTO: 21.9 % (ref 40.4–52.2)
HCT VFR BLD AUTO: 24.8 % (ref 40.4–52.2)
HCT VFR BLD AUTO: 24.8 % (ref 40.4–52.2)
HCT VFR BLD AUTO: 25.7 % (ref 40.4–52.2)
HCT VFR BLD AUTO: 26.4 % (ref 40.4–52.2)
HCT VFR BLD AUTO: 26.4 % (ref 40.4–52.2)
HCT VFR BLD AUTO: 26.5 % (ref 40.4–52.2)
HCT VFR BLD AUTO: 26.7 % (ref 40.4–52.2)
HCT VFR BLD AUTO: 27.3 % (ref 40.4–52.2)
HCT VFR BLD AUTO: 28 % (ref 40.4–52.2)
HCT VFR BLD AUTO: 28 % (ref 40.4–52.2)
HCT VFR BLD AUTO: 28.6 % (ref 40.4–52.2)
HCT VFR BLD AUTO: 29 % (ref 40.4–52.2)
HCT VFR BLD AUTO: 29.5 % (ref 40.4–52.2)
HCT VFR BLD AUTO: 29.9 % (ref 40.4–52.2)
HCT VFR BLD AUTO: 29.9 % (ref 40.4–52.2)
HCT VFR BLD AUTO: 31 % (ref 40.4–52.2)
HCT VFR BLD AUTO: 31.3 % (ref 40.4–52.2)
HCT VFR BLD AUTO: 31.5 % (ref 40.4–52.2)
HCT VFR BLD AUTO: 31.5 % (ref 40.4–52.2)
HCT VFR BLD AUTO: 32.5 % (ref 40.4–52.2)
HCT VFR BLD AUTO: 32.8 % (ref 40.4–52.2)
HCT VFR BLD AUTO: 32.8 % (ref 40.4–52.2)
HCT VFR BLD AUTO: 33.1 % (ref 40.4–52.2)
HCT VFR BLD AUTO: 34.5 % (ref 40.4–52.2)
HGB BLD-MCNC: 6.1 G/DL (ref 13.7–17.6)
HGB BLD-MCNC: 7.3 G/DL (ref 13.7–17.6)
HGB BLD-MCNC: 7.3 G/DL (ref 13.7–17.6)
HGB BLD-MCNC: 7.5 G/DL (ref 13.7–17.6)
HGB BLD-MCNC: 7.6 G/DL (ref 13.7–17.6)
HGB BLD-MCNC: 7.7 G/DL (ref 13.7–17.6)
HGB BLD-MCNC: 7.8 G/DL (ref 13.7–17.6)
HGB BLD-MCNC: 8 G/DL (ref 13.7–17.6)
HGB BLD-MCNC: 8.3 G/DL (ref 13.7–17.6)
HGB BLD-MCNC: 8.4 G/DL (ref 13.7–17.6)
HGB BLD-MCNC: 8.4 G/DL (ref 13.7–17.6)
HGB BLD-MCNC: 8.5 G/DL (ref 13.7–17.6)
HGB BLD-MCNC: 8.7 G/DL (ref 13.7–17.6)
HGB BLD-MCNC: 8.8 G/DL (ref 13.7–17.6)
HGB BLD-MCNC: 8.8 G/DL (ref 13.7–17.6)
HGB BLD-MCNC: 8.9 G/DL (ref 13.7–17.6)
HGB BLD-MCNC: 8.9 G/DL (ref 13.7–17.6)
HGB BLD-MCNC: 9.2 G/DL (ref 13.7–17.6)
HGB BLD-MCNC: 9.3 G/DL (ref 13.7–17.6)
HGB BLD-MCNC: 9.4 G/DL (ref 13.7–17.6)
HGB BLD-MCNC: 9.7 G/DL (ref 13.7–17.6)
HGB UR QL STRIP.AUTO: NEGATIVE
HYALINE CASTS UR QL AUTO: ABNORMAL /LPF
IMM GRANULOCYTES # BLD: 0 10*3/MM3 (ref 0–0.03)
IMM GRANULOCYTES # BLD: 0.02 10*3/MM3 (ref 0–0.03)
IMM GRANULOCYTES NFR BLD: 0 % (ref 0–0.5)
IMM GRANULOCYTES NFR BLD: 0.3 % (ref 0–0.5)
IMM GRANULOCYTES NFR BLD: 0.4 % (ref 0–0.5)
INR PPP: 1.01 (ref 0.9–1.1)
INR PPP: 1.08 (ref 0.9–1.1)
INR PPP: 1.14 (ref 0.9–1.1)
INR PPP: 1.2 (ref 0.9–1.1)
INR PPP: 1.28 (ref 0.9–1.1)
INR PPP: 1.29 (ref 0.9–1.1)
INR PPP: 1.3 (ref 0.9–1.1)
INR PPP: 1.31 (ref 0.9–1.1)
INR PPP: 1.32 (ref 0.9–1.1)
INR PPP: 1.33 (ref 0.9–1.1)
INR PPP: 1.36 (ref 0.9–1.1)
INR PPP: 1.4 (ref 0.9–1.1)
INR PPP: 1.47 (ref 0.9–1.1)
INR PPP: 1.84 (ref 0.9–1.1)
INR PPP: 2.2 (ref 0.9–1.1)
INR PPP: 2.26 (ref 0.9–1.1)
INR PPP: 2.27 (ref 0.9–1.1)
INR PPP: 2.27 (ref 0.9–1.1)
INR PPP: 2.34 (ref 0.9–1.1)
INR PPP: 2.49 (ref 0.9–1.1)
INR PPP: 2.57 (ref 0.9–1.1)
INR PPP: 2.63 (ref 0.9–1.1)
INR PPP: 2.67 (ref 0.9–1.1)
INR PPP: 2.8 (ref 0.9–1.1)
IRON 24H UR-MRATE: 14 MCG/DL (ref 59–158)
IRON 24H UR-MRATE: 15 MCG/DL (ref 59–158)
IRON SATN MFR SERPL: 5 % (ref 20–50)
IRON SATN MFR SERPL: 6 % (ref 20–50)
KETONES UR QL STRIP: NEGATIVE
LEFT ATRIUM VOLUME INDEX: 50 ML/M2
LEUKOCYTE ESTERASE UR QL STRIP.AUTO: ABNORMAL
LEUKOCYTE ESTERASE UR QL STRIP.AUTO: NEGATIVE
LYMPHOCYTES # BLD AUTO: 0.68 10*3/MM3 (ref 0.9–4.8)
LYMPHOCYTES # BLD AUTO: 0.71 10*3/MM3 (ref 0.9–4.8)
LYMPHOCYTES # BLD AUTO: 0.74 10*3/MM3 (ref 0.9–4.8)
LYMPHOCYTES # BLD AUTO: 0.83 10*3/MM3 (ref 0.9–4.8)
LYMPHOCYTES # BLD AUTO: 0.84 10*3/MM3 (ref 0.9–4.8)
LYMPHOCYTES # BLD AUTO: 0.99 10*3/MM3 (ref 0.9–4.8)
LYMPHOCYTES # BLD AUTO: 1.16 10*3/MM3 (ref 0.9–4.8)
LYMPHOCYTES # BLD AUTO: 1.24 10*3/MM3 (ref 0.9–4.8)
LYMPHOCYTES # BLD AUTO: 1.34 10*3/MM3 (ref 0.9–4.8)
LYMPHOCYTES # BLD AUTO: 1.35 10*3/MM3 (ref 0.9–4.8)
LYMPHOCYTES # BLD AUTO: 1.4 10*3/MM3 (ref 0.9–4.8)
LYMPHOCYTES # BLD AUTO: 1.41 10*3/MM3 (ref 0.9–4.8)
LYMPHOCYTES # BLD AUTO: 1.41 10*3/MM3 (ref 0.9–4.8)
LYMPHOCYTES # BLD AUTO: 1.47 10*3/MM3 (ref 0.9–4.8)
LYMPHOCYTES # BLD AUTO: 1.5 10*3/MM3 (ref 0.9–4.8)
LYMPHOCYTES # BLD AUTO: 1.67 10*3/MM3 (ref 0.9–4.8)
LYMPHOCYTES NFR BLD AUTO: 10.9 % (ref 19.6–45.3)
LYMPHOCYTES NFR BLD AUTO: 15.1 % (ref 19.6–45.3)
LYMPHOCYTES NFR BLD AUTO: 16.5 % (ref 19.6–45.3)
LYMPHOCYTES NFR BLD AUTO: 17 % (ref 19.6–45.3)
LYMPHOCYTES NFR BLD AUTO: 17.4 % (ref 19.6–45.3)
LYMPHOCYTES NFR BLD AUTO: 19.7 % (ref 19.6–45.3)
LYMPHOCYTES NFR BLD AUTO: 20 % (ref 19.6–45.3)
LYMPHOCYTES NFR BLD AUTO: 22 % (ref 19.6–45.3)
LYMPHOCYTES NFR BLD AUTO: 22.3 % (ref 19.6–45.3)
LYMPHOCYTES NFR BLD AUTO: 23.6 % (ref 19.6–45.3)
LYMPHOCYTES NFR BLD AUTO: 25.3 % (ref 19.6–45.3)
LYMPHOCYTES NFR BLD AUTO: 25.9 % (ref 19.6–45.3)
LYMPHOCYTES NFR BLD AUTO: 26.8 % (ref 19.6–45.3)
LYMPHOCYTES NFR BLD AUTO: 28.5 % (ref 19.6–45.3)
LYMPHOCYTES NFR BLD AUTO: 31.7 % (ref 19.6–45.3)
LYMPHOCYTES NFR BLD AUTO: 32.3 % (ref 19.6–45.3)
MAGNESIUM SERPL-MCNC: 1.7 MG/DL (ref 1.6–2.4)
MAGNESIUM SERPL-MCNC: 1.7 MG/DL (ref 1.6–2.4)
MAGNESIUM SERPL-MCNC: 1.8 MG/DL (ref 1.6–2.4)
MAGNESIUM SERPL-MCNC: 2 MG/DL (ref 1.6–2.4)
MCH RBC QN AUTO: 21.8 PG (ref 27–32.7)
MCH RBC QN AUTO: 21.8 PG (ref 27–32.7)
MCH RBC QN AUTO: 22.1 PG (ref 27–32.7)
MCH RBC QN AUTO: 22.2 PG (ref 27–32.7)
MCH RBC QN AUTO: 22.4 PG (ref 27–32.7)
MCH RBC QN AUTO: 22.9 PG (ref 27–32.7)
MCH RBC QN AUTO: 23 PG (ref 27–32.7)
MCH RBC QN AUTO: 23.2 PG (ref 27–32.7)
MCH RBC QN AUTO: 23.2 PG (ref 27–32.7)
MCH RBC QN AUTO: 23.4 PG (ref 27–32.7)
MCH RBC QN AUTO: 23.5 PG (ref 27–32.7)
MCH RBC QN AUTO: 23.6 PG (ref 27–32.7)
MCH RBC QN AUTO: 23.8 PG (ref 27–32.7)
MCH RBC QN AUTO: 24 PG (ref 27–32.7)
MCH RBC QN AUTO: 24.1 PG (ref 27–32.7)
MCH RBC QN AUTO: 24.4 PG (ref 27–32.7)
MCH RBC QN AUTO: 24.5 PG (ref 27–32.7)
MCH RBC QN AUTO: 24.7 PG (ref 27–32.7)
MCH RBC QN AUTO: 24.7 PG (ref 27–32.7)
MCH RBC QN AUTO: 24.8 PG (ref 27–32.7)
MCH RBC QN AUTO: 24.9 PG (ref 27–32.7)
MCH RBC QN AUTO: 24.9 PG (ref 27–32.7)
MCHC RBC AUTO-ENTMCNC: 27 G/DL (ref 32.6–36.4)
MCHC RBC AUTO-ENTMCNC: 27.8 G/DL (ref 32.6–36.4)
MCHC RBC AUTO-ENTMCNC: 27.9 G/DL (ref 32.6–36.4)
MCHC RBC AUTO-ENTMCNC: 28 G/DL (ref 32.6–36.4)
MCHC RBC AUTO-ENTMCNC: 28.1 G/DL (ref 32.6–36.4)
MCHC RBC AUTO-ENTMCNC: 28.3 G/DL (ref 32.6–36.4)
MCHC RBC AUTO-ENTMCNC: 28.4 G/DL (ref 32.6–36.4)
MCHC RBC AUTO-ENTMCNC: 28.6 G/DL (ref 32.6–36.4)
MCHC RBC AUTO-ENTMCNC: 28.7 G/DL (ref 32.6–36.4)
MCHC RBC AUTO-ENTMCNC: 28.8 G/DL (ref 32.6–36.4)
MCHC RBC AUTO-ENTMCNC: 28.8 G/DL (ref 32.6–36.4)
MCHC RBC AUTO-ENTMCNC: 29 G/DL (ref 32.6–36.4)
MCHC RBC AUTO-ENTMCNC: 29.1 G/DL (ref 32.6–36.4)
MCHC RBC AUTO-ENTMCNC: 29.1 G/DL (ref 32.6–36.4)
MCHC RBC AUTO-ENTMCNC: 29.2 G/DL (ref 32.6–36.4)
MCHC RBC AUTO-ENTMCNC: 29.2 G/DL (ref 32.6–36.4)
MCHC RBC AUTO-ENTMCNC: 29.4 G/DL (ref 32.6–36.4)
MCHC RBC AUTO-ENTMCNC: 29.6 G/DL (ref 32.6–36.4)
MCHC RBC AUTO-ENTMCNC: 29.7 G/DL (ref 32.6–36.4)
MCHC RBC AUTO-ENTMCNC: 29.8 G/DL (ref 32.6–36.4)
MCV RBC AUTO: 76.7 FL (ref 79.8–96.2)
MCV RBC AUTO: 77.3 FL (ref 79.8–96.2)
MCV RBC AUTO: 77.8 FL (ref 79.8–96.2)
MCV RBC AUTO: 77.8 FL (ref 79.8–96.2)
MCV RBC AUTO: 78.1 FL (ref 79.8–96.2)
MCV RBC AUTO: 78.4 FL (ref 79.8–96.2)
MCV RBC AUTO: 78.4 FL (ref 79.8–96.2)
MCV RBC AUTO: 78.8 FL (ref 79.8–96.2)
MCV RBC AUTO: 81.2 FL (ref 79.8–96.2)
MCV RBC AUTO: 81.7 FL (ref 79.8–96.2)
MCV RBC AUTO: 82.2 FL (ref 79.8–96.2)
MCV RBC AUTO: 82.4 FL (ref 79.8–96.2)
MCV RBC AUTO: 82.9 FL (ref 79.8–96.2)
MCV RBC AUTO: 83.1 FL (ref 79.8–96.2)
MCV RBC AUTO: 83.7 FL (ref 79.8–96.2)
MCV RBC AUTO: 83.8 FL (ref 79.8–96.2)
MCV RBC AUTO: 83.9 FL (ref 79.8–96.2)
MCV RBC AUTO: 84.4 FL (ref 79.8–96.2)
MCV RBC AUTO: 84.5 FL (ref 79.8–96.2)
MCV RBC AUTO: 84.6 FL (ref 79.8–96.2)
MCV RBC AUTO: 85.2 FL (ref 79.8–96.2)
MCV RBC AUTO: 85.3 FL (ref 79.8–96.2)
MCV RBC AUTO: 85.6 FL (ref 79.8–96.2)
MCV RBC AUTO: 85.8 FL (ref 79.8–96.2)
MCV RBC AUTO: 90.3 FL (ref 79.8–96.2)
METHADONE UR QL SCN: NEGATIVE
METHADONE UR QL SCN: NEGATIVE
MONOCYTES # BLD AUTO: 0.32 10*3/MM3 (ref 0.2–1.2)
MONOCYTES # BLD AUTO: 0.34 10*3/MM3 (ref 0.2–1.2)
MONOCYTES # BLD AUTO: 0.35 10*3/MM3 (ref 0.2–1.2)
MONOCYTES # BLD AUTO: 0.36 10*3/MM3 (ref 0.2–1.2)
MONOCYTES # BLD AUTO: 0.39 10*3/MM3 (ref 0.2–1.2)
MONOCYTES # BLD AUTO: 0.4 10*3/MM3 (ref 0.2–1.2)
MONOCYTES # BLD AUTO: 0.41 10*3/MM3 (ref 0.2–1.2)
MONOCYTES # BLD AUTO: 0.43 10*3/MM3 (ref 0.2–1.2)
MONOCYTES # BLD AUTO: 0.45 10*3/MM3 (ref 0.2–1.2)
MONOCYTES # BLD AUTO: 0.51 10*3/MM3 (ref 0.2–1.2)
MONOCYTES # BLD AUTO: 0.52 10*3/MM3 (ref 0.2–1.2)
MONOCYTES # BLD AUTO: 0.53 10*3/MM3 (ref 0.2–1.2)
MONOCYTES # BLD AUTO: 0.54 10*3/MM3 (ref 0.2–1.2)
MONOCYTES # BLD AUTO: 0.55 10*3/MM3 (ref 0.2–1.2)
MONOCYTES # BLD AUTO: 0.63 10*3/MM3 (ref 0.2–1.2)
MONOCYTES # BLD AUTO: 0.83 10*3/MM3 (ref 0.2–1.2)
MONOCYTES NFR BLD AUTO: 10.1 % (ref 5–12)
MONOCYTES NFR BLD AUTO: 10.6 % (ref 5–12)
MONOCYTES NFR BLD AUTO: 11.7 % (ref 5–12)
MONOCYTES NFR BLD AUTO: 7.2 % (ref 5–12)
MONOCYTES NFR BLD AUTO: 7.3 % (ref 5–12)
MONOCYTES NFR BLD AUTO: 7.5 % (ref 5–12)
MONOCYTES NFR BLD AUTO: 7.7 % (ref 5–12)
MONOCYTES NFR BLD AUTO: 7.9 % (ref 5–12)
MONOCYTES NFR BLD AUTO: 8.1 % (ref 5–12)
MONOCYTES NFR BLD AUTO: 8.1 % (ref 5–12)
MONOCYTES NFR BLD AUTO: 8.2 % (ref 5–12)
MONOCYTES NFR BLD AUTO: 8.7 % (ref 5–12)
MONOCYTES NFR BLD AUTO: 9.1 % (ref 5–12)
MONOCYTES NFR BLD AUTO: 9.2 % (ref 5–12)
MONOCYTES NFR BLD AUTO: 9.5 % (ref 5–12)
MONOCYTES NFR BLD AUTO: 9.6 % (ref 5–12)
NEUTROPHILS # BLD AUTO: 2.72 10*3/MM3 (ref 1.9–8.1)
NEUTROPHILS # BLD AUTO: 2.74 10*3/MM3 (ref 1.9–8.1)
NEUTROPHILS # BLD AUTO: 2.87 10*3/MM3 (ref 1.9–8.1)
NEUTROPHILS # BLD AUTO: 2.88 10*3/MM3 (ref 1.9–8.1)
NEUTROPHILS # BLD AUTO: 3.03 10*3/MM3 (ref 1.9–8.1)
NEUTROPHILS # BLD AUTO: 3.03 10*3/MM3 (ref 1.9–8.1)
NEUTROPHILS # BLD AUTO: 3.04 10*3/MM3 (ref 1.9–8.1)
NEUTROPHILS # BLD AUTO: 3.2 10*3/MM3 (ref 1.9–8.1)
NEUTROPHILS # BLD AUTO: 3.36 10*3/MM3 (ref 1.9–8.1)
NEUTROPHILS # BLD AUTO: 3.43 10*3/MM3 (ref 1.9–8.1)
NEUTROPHILS # BLD AUTO: 3.88 10*3/MM3 (ref 1.9–8.1)
NEUTROPHILS # BLD AUTO: 3.96 10*3/MM3 (ref 1.9–8.1)
NEUTROPHILS # BLD AUTO: 4.04 10*3/MM3 (ref 1.9–8.1)
NEUTROPHILS # BLD AUTO: 4.69 10*3/MM3 (ref 1.9–8.1)
NEUTROPHILS # BLD AUTO: 4.89 10*3/MM3 (ref 1.9–8.1)
NEUTROPHILS # BLD AUTO: 4.98 10*3/MM3 (ref 1.9–8.1)
NEUTROPHILS NFR BLD AUTO: 57.6 % (ref 42.7–76)
NEUTROPHILS NFR BLD AUTO: 58.2 % (ref 42.7–76)
NEUTROPHILS NFR BLD AUTO: 58.5 % (ref 42.7–76)
NEUTROPHILS NFR BLD AUTO: 61.9 % (ref 42.7–76)
NEUTROPHILS NFR BLD AUTO: 62.9 % (ref 42.7–76)
NEUTROPHILS NFR BLD AUTO: 63.2 % (ref 42.7–76)
NEUTROPHILS NFR BLD AUTO: 63.7 % (ref 42.7–76)
NEUTROPHILS NFR BLD AUTO: 65.1 % (ref 42.7–76)
NEUTROPHILS NFR BLD AUTO: 66 % (ref 42.7–76)
NEUTROPHILS NFR BLD AUTO: 68.7 % (ref 42.7–76)
NEUTROPHILS NFR BLD AUTO: 69.7 % (ref 42.7–76)
NEUTROPHILS NFR BLD AUTO: 70.6 % (ref 42.7–76)
NEUTROPHILS NFR BLD AUTO: 70.7 % (ref 42.7–76)
NEUTROPHILS NFR BLD AUTO: 71.2 % (ref 42.7–76)
NEUTROPHILS NFR BLD AUTO: 73.6 % (ref 42.7–76)
NEUTROPHILS NFR BLD AUTO: 78.2 % (ref 42.7–76)
NITRITE UR QL STRIP: NEGATIVE
NRBC BLD MANUAL-RTO: 0 /100 WBC (ref 0–0)
NT-PROBNP SERPL-MCNC: 7906 PG/ML (ref 0–1800)
OPIATES UR QL: NEGATIVE
OPIATES UR QL: NEGATIVE
OVALOCYTES BLD QL SMEAR: NORMAL
OXYCODONE UR QL SCN: NEGATIVE
OXYCODONE UR QL SCN: NEGATIVE
PH UR STRIP.AUTO: 5.5 [PH] (ref 5–8)
PH UR STRIP.AUTO: 7.5 [PH] (ref 5–8)
PH UR STRIP.AUTO: 7.5 [PH] (ref 5–8)
PH UR STRIP.AUTO: 8 [PH] (ref 5–8)
PLAT MORPH BLD: NORMAL
PLATELET # BLD AUTO: 142 10*3/MM3 (ref 140–500)
PLATELET # BLD AUTO: 151 10*3/MM3 (ref 140–500)
PLATELET # BLD AUTO: 157 10*3/MM3 (ref 140–500)
PLATELET # BLD AUTO: 164 10*3/MM3 (ref 140–500)
PLATELET # BLD AUTO: 165 10*3/MM3 (ref 140–500)
PLATELET # BLD AUTO: 168 10*3/MM3 (ref 140–500)
PLATELET # BLD AUTO: 172 10*3/MM3 (ref 140–500)
PLATELET # BLD AUTO: 176 10*3/MM3 (ref 140–500)
PLATELET # BLD AUTO: 178 10*3/MM3 (ref 140–500)
PLATELET # BLD AUTO: 180 10*3/MM3 (ref 140–500)
PLATELET # BLD AUTO: 180 10*3/MM3 (ref 140–500)
PLATELET # BLD AUTO: 181 10*3/MM3 (ref 140–500)
PLATELET # BLD AUTO: 181 10*3/MM3 (ref 140–500)
PLATELET # BLD AUTO: 182 10*3/MM3 (ref 140–500)
PLATELET # BLD AUTO: 190 10*3/MM3 (ref 140–500)
PLATELET # BLD AUTO: 193 10*3/MM3 (ref 140–500)
PLATELET # BLD AUTO: 200 10*3/MM3 (ref 140–500)
PLATELET # BLD AUTO: 201 10*3/MM3 (ref 140–500)
PLATELET # BLD AUTO: 205 10*3/MM3 (ref 140–500)
PLATELET # BLD AUTO: 206 10*3/MM3 (ref 140–500)
PLATELET # BLD AUTO: 208 10*3/MM3 (ref 140–500)
PLATELET # BLD AUTO: 211 10*3/MM3 (ref 140–500)
PLATELET # BLD AUTO: 218 10*3/MM3 (ref 140–500)
PLATELET # BLD AUTO: 231 10*3/MM3 (ref 140–500)
PLATELET # BLD AUTO: 237 10*3/MM3 (ref 140–500)
PMV BLD AUTO: 10 FL (ref 6–12)
PMV BLD AUTO: 10 FL (ref 6–12)
PMV BLD AUTO: 10.4 FL (ref 6–12)
PMV BLD AUTO: 8.8 FL (ref 6–12)
PMV BLD AUTO: 8.9 FL (ref 6–12)
PMV BLD AUTO: 8.9 FL (ref 6–12)
PMV BLD AUTO: 9 FL (ref 6–12)
PMV BLD AUTO: 9 FL (ref 6–12)
PMV BLD AUTO: 9.1 FL (ref 6–12)
PMV BLD AUTO: 9.2 FL (ref 6–12)
PMV BLD AUTO: 9.2 FL (ref 6–12)
PMV BLD AUTO: 9.3 FL (ref 6–12)
PMV BLD AUTO: 9.4 FL (ref 6–12)
PMV BLD AUTO: 9.5 FL (ref 6–12)
PMV BLD AUTO: 9.5 FL (ref 6–12)
PMV BLD AUTO: 9.6 FL (ref 6–12)
PMV BLD AUTO: 9.6 FL (ref 6–12)
PMV BLD AUTO: 9.7 FL (ref 6–12)
PMV BLD AUTO: 9.8 FL (ref 6–12)
PMV BLD AUTO: 9.8 FL (ref 6–12)
PMV BLD AUTO: 9.9 FL (ref 6–12)
POTASSIUM BLD-SCNC: 3.3 MMOL/L (ref 3.5–5.2)
POTASSIUM BLD-SCNC: 3.6 MMOL/L (ref 3.5–5.2)
POTASSIUM BLD-SCNC: 3.6 MMOL/L (ref 3.5–5.2)
POTASSIUM BLD-SCNC: 3.8 MMOL/L (ref 3.5–5.2)
POTASSIUM BLD-SCNC: 3.9 MMOL/L (ref 3.5–5.2)
POTASSIUM BLD-SCNC: 4 MMOL/L (ref 3.5–5.2)
POTASSIUM BLD-SCNC: 4 MMOL/L (ref 3.5–5.2)
POTASSIUM BLD-SCNC: 4.1 MMOL/L (ref 3.5–5.2)
POTASSIUM BLD-SCNC: 4.2 MMOL/L (ref 3.5–5.2)
POTASSIUM BLD-SCNC: 4.2 MMOL/L (ref 3.5–5.2)
POTASSIUM BLD-SCNC: 4.3 MMOL/L (ref 3.5–5.2)
POTASSIUM BLD-SCNC: 4.5 MMOL/L (ref 3.5–5.2)
POTASSIUM BLD-SCNC: 4.6 MMOL/L (ref 3.5–5.2)
POTASSIUM BLD-SCNC: 4.6 MMOL/L (ref 3.5–5.2)
POTASSIUM BLD-SCNC: 5.4 MMOL/L (ref 3.5–5.2)
PROCALCITONIN SERPL-MCNC: 0.16 NG/ML (ref 0.1–0.25)
PROT SERPL-MCNC: 5.5 G/DL (ref 6–8.5)
PROT SERPL-MCNC: 5.8 G/DL (ref 6–8.5)
PROT SERPL-MCNC: 5.8 G/DL (ref 6–8.5)
PROT SERPL-MCNC: 6.4 G/DL (ref 6–8.5)
PROT SERPL-MCNC: 6.4 G/DL (ref 6–8.5)
PROT SERPL-MCNC: 6.5 G/DL (ref 6–8.5)
PROT SERPL-MCNC: 7.1 G/DL (ref 6–8.5)
PROT SERPL-MCNC: 7.6 G/DL (ref 6–8.5)
PROT UR QL STRIP: ABNORMAL
PROT UR QL STRIP: ABNORMAL
PROT UR QL STRIP: NEGATIVE
PROT UR QL STRIP: NEGATIVE
PROTHROMBIN TIME: 12.9 SECONDS (ref 11.7–14.2)
PROTHROMBIN TIME: 13.6 SECONDS (ref 11.7–14.2)
PROTHROMBIN TIME: 14.1 SECONDS (ref 11.7–14.2)
PROTHROMBIN TIME: 14.7 SECONDS (ref 11.7–14.2)
PROTHROMBIN TIME: 15.5 SECONDS (ref 11.7–14.2)
PROTHROMBIN TIME: 15.6 SECONDS (ref 11.7–14.2)
PROTHROMBIN TIME: 15.7 SECONDS (ref 11.7–14.2)
PROTHROMBIN TIME: 15.8 SECONDS (ref 11.7–14.2)
PROTHROMBIN TIME: 15.9 SECONDS (ref 11.7–14.2)
PROTHROMBIN TIME: 16 SECONDS (ref 11.7–14.2)
PROTHROMBIN TIME: 16.3 SECONDS (ref 11.7–14.2)
PROTHROMBIN TIME: 16.7 SECONDS (ref 11.7–14.2)
PROTHROMBIN TIME: 17.3 SECONDS (ref 11.7–14.2)
PROTHROMBIN TIME: 20.6 SECONDS (ref 11.7–14.2)
PROTHROMBIN TIME: 23.7 SECONDS (ref 11.7–14.2)
PROTHROMBIN TIME: 24.2 SECONDS (ref 11.7–14.2)
PROTHROMBIN TIME: 24.2 SECONDS (ref 11.7–14.2)
PROTHROMBIN TIME: 24.3 SECONDS (ref 11.7–14.2)
PROTHROMBIN TIME: 24.8 SECONDS (ref 11.7–14.2)
PROTHROMBIN TIME: 26.1 SECONDS (ref 11.7–14.2)
PROTHROMBIN TIME: 26.8 SECONDS (ref 11.7–14.2)
PROTHROMBIN TIME: 27.3 SECONDS (ref 11.7–14.2)
PROTHROMBIN TIME: 27.5 SECONDS (ref 11.7–14.2)
PROTHROMBIN TIME: 28.6 SECONDS (ref 11.7–14.2)
RBC # BLD AUTO: 2.61 10*6/MM3 (ref 4.6–6)
RBC # BLD AUTO: 2.93 10*6/MM3 (ref 4.6–6)
RBC # BLD AUTO: 2.94 10*6/MM3 (ref 4.6–6)
RBC # BLD AUTO: 3.04 10*6/MM3 (ref 4.6–6)
RBC # BLD AUTO: 3.09 10*6/MM3 (ref 4.6–6)
RBC # BLD AUTO: 3.1 10*6/MM3 (ref 4.6–6)
RBC # BLD AUTO: 3.12 10*6/MM3 (ref 4.6–6)
RBC # BLD AUTO: 3.15 10*6/MM3 (ref 4.6–6)
RBC # BLD AUTO: 3.2 10*6/MM3 (ref 4.6–6)
RBC # BLD AUTO: 3.44 10*6/MM3 (ref 4.6–6)
RBC # BLD AUTO: 3.49 10*6/MM3 (ref 4.6–6)
RBC # BLD AUTO: 3.5 10*6/MM3 (ref 4.6–6)
RBC # BLD AUTO: 3.57 10*6/MM3 (ref 4.6–6)
RBC # BLD AUTO: 3.62 10*6/MM3 (ref 4.6–6)
RBC # BLD AUTO: 3.66 10*6/MM3 (ref 4.6–6)
RBC # BLD AUTO: 3.79 10*6/MM3 (ref 4.6–6)
RBC # BLD AUTO: 3.82 10*6/MM3 (ref 4.6–6)
RBC # BLD AUTO: 3.83 10*6/MM3 (ref 4.6–6)
RBC # BLD AUTO: 3.83 10*6/MM3 (ref 4.6–6)
RBC # BLD AUTO: 3.92 10*6/MM3 (ref 4.6–6)
RBC # BLD AUTO: 3.98 10*6/MM3 (ref 4.6–6)
RBC # BLD AUTO: 4.08 10*6/MM3 (ref 4.6–6)
RBC # BLD AUTO: 4.18 10*6/MM3 (ref 4.6–6)
RBC # BLD AUTO: 4.22 10*6/MM3 (ref 4.6–6)
RBC # BLD AUTO: 4.38 10*6/MM3 (ref 4.6–6)
RBC # UR: ABNORMAL /HPF
REF LAB TEST METHOD: ABNORMAL
RETICS/RBC NFR AUTO: 1.58 % (ref 0.5–1.5)
RH BLD: POSITIVE
SODIUM BLD-SCNC: 139 MMOL/L (ref 136–145)
SODIUM BLD-SCNC: 141 MMOL/L (ref 136–145)
SODIUM BLD-SCNC: 142 MMOL/L (ref 136–145)
SODIUM BLD-SCNC: 143 MMOL/L (ref 136–145)
SODIUM BLD-SCNC: 144 MMOL/L (ref 136–145)
SODIUM BLD-SCNC: 146 MMOL/L (ref 136–145)
SODIUM BLD-SCNC: 147 MMOL/L (ref 136–145)
SP GR UR STRIP: 1.01 (ref 1–1.03)
SQUAMOUS #/AREA URNS HPF: ABNORMAL /HPF
STOMATOCYTES BLD QL SMEAR: NORMAL
T4 FREE SERPL-MCNC: 1.34 NG/DL (ref 0.93–1.7)
TIBC SERPL-MCNC: 231 MCG/DL
TIBC SERPL-MCNC: 288 MCG/DL (ref 298–536)
TRANSFERRIN SERPL-MCNC: 155 MG/DL (ref 200–360)
TRANSFERRIN SERPL-MCNC: 193 MG/DL (ref 200–360)
TROPONIN T SERPL-MCNC: 0.02 NG/ML (ref 0–0.03)
TROPONIN T SERPL-MCNC: 0.03 NG/ML (ref 0–0.03)
TROPONIN T SERPL-MCNC: <0.01 NG/ML (ref 0–0.03)
TSH SERPL DL<=0.05 MIU/L-ACNC: 0.26 MIU/ML (ref 0.27–4.2)
UNIT  ABO: NORMAL
UNIT  RH: NORMAL
UROBILINOGEN UR QL STRIP: ABNORMAL
VALPROATE SERPL-MCNC: 19 MCG/ML (ref 50–125)
VALPROATE SERPL-MCNC: 24 MCG/ML (ref 50–125)
VALPROATE SERPL-MCNC: 8 MCG/ML (ref 50–125)
VIT B12 BLD-MCNC: 495 PG/ML (ref 211–946)
VIT B12 BLD-MCNC: 683 PG/ML (ref 211–946)
WBC MORPH BLD: NORMAL
WBC NRBC COR # BLD: 3.72 10*3/MM3 (ref 4.5–10.7)
WBC NRBC COR # BLD: 4.25 10*3/MM3 (ref 4.5–10.7)
WBC NRBC COR # BLD: 4.3 10*3/MM3 (ref 4.5–10.7)
WBC NRBC COR # BLD: 4.33 10*3/MM3 (ref 4.5–10.7)
WBC NRBC COR # BLD: 4.35 10*3/MM3 (ref 4.5–10.7)
WBC NRBC COR # BLD: 4.43 10*3/MM3 (ref 4.5–10.7)
WBC NRBC COR # BLD: 4.65 10*3/MM3 (ref 4.5–10.7)
WBC NRBC COR # BLD: 4.82 10*3/MM3 (ref 4.5–10.7)
WBC NRBC COR # BLD: 4.94 10*3/MM3 (ref 4.5–10.7)
WBC NRBC COR # BLD: 4.97 10*3/MM3 (ref 4.5–10.7)
WBC NRBC COR # BLD: 5.09 10*3/MM3 (ref 4.5–10.7)
WBC NRBC COR # BLD: 5.17 10*3/MM3 (ref 4.5–10.7)
WBC NRBC COR # BLD: 5.26 10*3/MM3 (ref 4.5–10.7)
WBC NRBC COR # BLD: 5.34 10*3/MM3 (ref 4.5–10.7)
WBC NRBC COR # BLD: 5.4 10*3/MM3 (ref 4.5–10.7)
WBC NRBC COR # BLD: 5.49 10*3/MM3 (ref 4.5–10.7)
WBC NRBC COR # BLD: 5.64 10*3/MM3 (ref 4.5–10.7)
WBC NRBC COR # BLD: 6.04 10*3/MM3 (ref 4.5–10.7)
WBC NRBC COR # BLD: 6.12 10*3/MM3 (ref 4.5–10.7)
WBC NRBC COR # BLD: 6.22 10*3/MM3 (ref 4.5–10.7)
WBC NRBC COR # BLD: 6.25 10*3/MM3 (ref 4.5–10.7)
WBC NRBC COR # BLD: 6.65 10*3/MM3 (ref 4.5–10.7)
WBC NRBC COR # BLD: 7.05 10*3/MM3 (ref 4.5–10.7)
WBC NRBC COR # BLD: 7.11 10*3/MM3 (ref 4.5–10.7)
WBC NRBC COR # BLD: 7.8 10*3/MM3 (ref 4.5–10.7)
WBC UR QL AUTO: ABNORMAL /HPF
WHOLE BLOOD HOLD SPECIMEN: NORMAL

## 2017-01-01 PROCEDURE — 0SRS0J9 REPLACEMENT OF LEFT HIP JOINT, FEMORAL SURFACE WITH SYNTHETIC SUBSTITUTE, CEMENTED, OPEN APPROACH: ICD-10-PCS | Performed by: ORTHOPAEDIC SURGERY

## 2017-01-01 PROCEDURE — 82962 GLUCOSE BLOOD TEST: CPT

## 2017-01-01 PROCEDURE — 25010000003 CEFAZOLIN IN D5W 1 GM/50ML SOLUTION: Performed by: ORTHOPAEDIC SURGERY

## 2017-01-01 PROCEDURE — 86900 BLOOD TYPING SEROLOGIC ABO: CPT

## 2017-01-01 PROCEDURE — 83735 ASSAY OF MAGNESIUM: CPT | Performed by: INTERNAL MEDICINE

## 2017-01-01 PROCEDURE — 85610 PROTHROMBIN TIME: CPT | Performed by: EMERGENCY MEDICINE

## 2017-01-01 PROCEDURE — 25010000002 ENOXAPARIN PER 10 MG: Performed by: ORTHOPAEDIC SURGERY

## 2017-01-01 PROCEDURE — 85025 COMPLETE CBC W/AUTO DIFF WBC: CPT | Performed by: INTERNAL MEDICINE

## 2017-01-01 PROCEDURE — 85610 PROTHROMBIN TIME: CPT | Performed by: INTERNAL MEDICINE

## 2017-01-01 PROCEDURE — 25010000002 MORPHINE PER 10 MG: Performed by: HOSPITALIST

## 2017-01-01 PROCEDURE — 80307 DRUG TEST PRSMV CHEM ANLYZR: CPT | Performed by: PHYSICIAN ASSISTANT

## 2017-01-01 PROCEDURE — 85730 THROMBOPLASTIN TIME PARTIAL: CPT | Performed by: EMERGENCY MEDICINE

## 2017-01-01 PROCEDURE — 86900 BLOOD TYPING SEROLOGIC ABO: CPT | Performed by: EMERGENCY MEDICINE

## 2017-01-01 PROCEDURE — 71010 HC CHEST PA OR AP: CPT

## 2017-01-01 PROCEDURE — 93010 ELECTROCARDIOGRAM REPORT: CPT | Performed by: INTERNAL MEDICINE

## 2017-01-01 PROCEDURE — C1776 JOINT DEVICE (IMPLANTABLE): HCPCS | Performed by: ORTHOPAEDIC SURGERY

## 2017-01-01 PROCEDURE — 25010000002 HYDROMORPHONE PER 4 MG: Performed by: INTERNAL MEDICINE

## 2017-01-01 PROCEDURE — 93005 ELECTROCARDIOGRAM TRACING: CPT | Performed by: INTERNAL MEDICINE

## 2017-01-01 PROCEDURE — 80048 BASIC METABOLIC PNL TOTAL CA: CPT | Performed by: INTERNAL MEDICINE

## 2017-01-01 PROCEDURE — 78451 HT MUSCLE IMAGE SPECT SING: CPT | Performed by: INTERNAL MEDICINE

## 2017-01-01 PROCEDURE — 25010000002 HYDRALAZINE PER 20 MG: Performed by: NURSE PRACTITIONER

## 2017-01-01 PROCEDURE — 25010000002 ENOXAPARIN PER 10 MG: Performed by: INTERNAL MEDICINE

## 2017-01-01 PROCEDURE — 92610 EVALUATE SWALLOWING FUNCTION: CPT

## 2017-01-01 PROCEDURE — 90791 PSYCH DIAGNOSTIC EVALUATION: CPT

## 2017-01-01 PROCEDURE — 97110 THERAPEUTIC EXERCISES: CPT

## 2017-01-01 PROCEDURE — 99285 EMERGENCY DEPT VISIT HI MDM: CPT

## 2017-01-01 PROCEDURE — 25010000002 PIPERACILLIN SOD-TAZOBACTAM PER 1 G: Performed by: EMERGENCY MEDICINE

## 2017-01-01 PROCEDURE — 73030 X-RAY EXAM OF SHOULDER: CPT

## 2017-01-01 PROCEDURE — 25010000002 LORAZEPAM PER 2 MG: Performed by: INTERNAL MEDICINE

## 2017-01-01 PROCEDURE — 85027 COMPLETE CBC AUTOMATED: CPT | Performed by: INTERNAL MEDICINE

## 2017-01-01 PROCEDURE — 93005 ELECTROCARDIOGRAM TRACING: CPT | Performed by: NURSE PRACTITIONER

## 2017-01-01 PROCEDURE — 78452 HT MUSCLE IMAGE SPECT MULT: CPT

## 2017-01-01 PROCEDURE — 25010000002 HYDRALAZINE PER 20 MG: Performed by: INTERNAL MEDICINE

## 2017-01-01 PROCEDURE — C1713 ANCHOR/SCREW BN/BN,TIS/BN: HCPCS | Performed by: ORTHOPAEDIC SURGERY

## 2017-01-01 PROCEDURE — 99231 SBSQ HOSP IP/OBS SF/LOW 25: CPT | Performed by: INTERNAL MEDICINE

## 2017-01-01 PROCEDURE — 97162 PT EVAL MOD COMPLEX 30 MIN: CPT

## 2017-01-01 PROCEDURE — 82746 ASSAY OF FOLIC ACID SERUM: CPT | Performed by: HOSPITALIST

## 2017-01-01 PROCEDURE — 25010000002 IRON SUCROSE PER 1 MG: Performed by: INTERNAL MEDICINE

## 2017-01-01 PROCEDURE — 93017 CV STRESS TEST TRACING ONLY: CPT

## 2017-01-01 PROCEDURE — 81001 URINALYSIS AUTO W/SCOPE: CPT | Performed by: EMERGENCY MEDICINE

## 2017-01-01 PROCEDURE — 74176 CT ABD & PELVIS W/O CONTRAST: CPT

## 2017-01-01 PROCEDURE — 71020 HC CHEST PA AND LATERAL: CPT

## 2017-01-01 PROCEDURE — 25010000003 POTASSIUM CHLORIDE 10 MEQ/100ML SOLUTION: Performed by: INTERNAL MEDICINE

## 2017-01-01 PROCEDURE — 86850 RBC ANTIBODY SCREEN: CPT | Performed by: HOSPITALIST

## 2017-01-01 PROCEDURE — 73501 X-RAY EXAM HIP UNI 1 VIEW: CPT

## 2017-01-01 PROCEDURE — 83540 ASSAY OF IRON: CPT | Performed by: HOSPITALIST

## 2017-01-01 PROCEDURE — 25010000002 FENTANYL CITRATE (PF) 100 MCG/2ML SOLUTION: Performed by: ANESTHESIOLOGY

## 2017-01-01 PROCEDURE — 36415 COLL VENOUS BLD VENIPUNCTURE: CPT | Performed by: EMERGENCY MEDICINE

## 2017-01-01 PROCEDURE — 85610 PROTHROMBIN TIME: CPT | Performed by: HOSPITALIST

## 2017-01-01 PROCEDURE — 25010000002 AMIODARONE IN DEXTROSE 5% 360-4.14 MG/200ML-% SOLUTION: Performed by: INTERNAL MEDICINE

## 2017-01-01 PROCEDURE — 84484 ASSAY OF TROPONIN QUANT: CPT | Performed by: EMERGENCY MEDICINE

## 2017-01-01 PROCEDURE — 96360 HYDRATION IV INFUSION INIT: CPT

## 2017-01-01 PROCEDURE — 25010000002 LEVOFLOXACIN PER 250 MG: Performed by: INTERNAL MEDICINE

## 2017-01-01 PROCEDURE — 84443 ASSAY THYROID STIM HORMONE: CPT | Performed by: HOSPITALIST

## 2017-01-01 PROCEDURE — 99222 1ST HOSP IP/OBS MODERATE 55: CPT | Performed by: INTERNAL MEDICINE

## 2017-01-01 PROCEDURE — 70450 CT HEAD/BRAIN W/O DYE: CPT

## 2017-01-01 PROCEDURE — 80307 DRUG TEST PRSMV CHEM ANLYZR: CPT | Performed by: EMERGENCY MEDICINE

## 2017-01-01 PROCEDURE — 93005 ELECTROCARDIOGRAM TRACING: CPT | Performed by: EMERGENCY MEDICINE

## 2017-01-01 PROCEDURE — P9016 RBC LEUKOCYTES REDUCED: HCPCS

## 2017-01-01 PROCEDURE — 80053 COMPREHEN METABOLIC PANEL: CPT | Performed by: PHYSICIAN ASSISTANT

## 2017-01-01 PROCEDURE — 86900 BLOOD TYPING SEROLOGIC ABO: CPT | Performed by: HOSPITALIST

## 2017-01-01 PROCEDURE — 25010000002 PHENYLEPHRINE PER 1 ML: Performed by: ANESTHESIOLOGY

## 2017-01-01 PROCEDURE — 73070 X-RAY EXAM OF ELBOW: CPT

## 2017-01-01 PROCEDURE — 85025 COMPLETE CBC W/AUTO DIFF WBC: CPT | Performed by: PHYSICIAN ASSISTANT

## 2017-01-01 PROCEDURE — 87086 URINE CULTURE/COLONY COUNT: CPT | Performed by: PHYSICIAN ASSISTANT

## 2017-01-01 PROCEDURE — 85025 COMPLETE CBC W/AUTO DIFF WBC: CPT | Performed by: HOSPITALIST

## 2017-01-01 PROCEDURE — 86901 BLOOD TYPING SEROLOGIC RH(D): CPT | Performed by: EMERGENCY MEDICINE

## 2017-01-01 PROCEDURE — 84484 ASSAY OF TROPONIN QUANT: CPT | Performed by: HOSPITALIST

## 2017-01-01 PROCEDURE — 84484 ASSAY OF TROPONIN QUANT: CPT | Performed by: INTERNAL MEDICINE

## 2017-01-01 PROCEDURE — 84466 ASSAY OF TRANSFERRIN: CPT | Performed by: HOSPITALIST

## 2017-01-01 PROCEDURE — 82728 ASSAY OF FERRITIN: CPT | Performed by: INTERNAL MEDICINE

## 2017-01-01 PROCEDURE — 93306 TTE W/DOPPLER COMPLETE: CPT | Performed by: INTERNAL MEDICINE

## 2017-01-01 PROCEDURE — 82746 ASSAY OF FOLIC ACID SERUM: CPT | Performed by: INTERNAL MEDICINE

## 2017-01-01 PROCEDURE — 80053 COMPREHEN METABOLIC PANEL: CPT | Performed by: EMERGENCY MEDICINE

## 2017-01-01 PROCEDURE — 86850 RBC ANTIBODY SCREEN: CPT | Performed by: EMERGENCY MEDICINE

## 2017-01-01 PROCEDURE — 82550 ASSAY OF CK (CPK): CPT | Performed by: HOSPITALIST

## 2017-01-01 PROCEDURE — 25010000002 KETOROLAC TROMETHAMINE PER 15 MG: Performed by: ORTHOPAEDIC SURGERY

## 2017-01-01 PROCEDURE — 80048 BASIC METABOLIC PNL TOTAL CA: CPT | Performed by: HOSPITALIST

## 2017-01-01 PROCEDURE — 25010000002 FENTANYL CITRATE (PF) 100 MCG/2ML SOLUTION: Performed by: NURSE ANESTHETIST, CERTIFIED REGISTERED

## 2017-01-01 PROCEDURE — 80053 COMPREHEN METABOLIC PANEL: CPT | Performed by: INTERNAL MEDICINE

## 2017-01-01 PROCEDURE — 80048 BASIC METABOLIC PNL TOTAL CA: CPT | Performed by: NURSE PRACTITIONER

## 2017-01-01 PROCEDURE — 83605 ASSAY OF LACTIC ACID: CPT | Performed by: EMERGENCY MEDICINE

## 2017-01-01 PROCEDURE — 87040 BLOOD CULTURE FOR BACTERIA: CPT | Performed by: EMERGENCY MEDICINE

## 2017-01-01 PROCEDURE — 93227 XTRNL ECG REC<48 HR R&I: CPT | Performed by: INTERNAL MEDICINE

## 2017-01-01 PROCEDURE — 25010000002 LORAZEPAM PER 2 MG

## 2017-01-01 PROCEDURE — 85610 PROTHROMBIN TIME: CPT | Performed by: PHYSICIAN ASSISTANT

## 2017-01-01 PROCEDURE — 85025 COMPLETE CBC W/AUTO DIFF WBC: CPT | Performed by: EMERGENCY MEDICINE

## 2017-01-01 PROCEDURE — 99284 EMERGENCY DEPT VISIT MOD MDM: CPT

## 2017-01-01 PROCEDURE — 25010000002 AMIODARONE IN DEXTROSE 5% 150-4.21 MG/100ML-% SOLUTION: Performed by: INTERNAL MEDICINE

## 2017-01-01 PROCEDURE — 85025 COMPLETE CBC W/AUTO DIFF WBC: CPT | Performed by: NURSE PRACTITIONER

## 2017-01-01 PROCEDURE — 36430 TRANSFUSION BLD/BLD COMPNT: CPT

## 2017-01-01 PROCEDURE — 80048 BASIC METABOLIC PNL TOTAL CA: CPT | Performed by: PHYSICIAN ASSISTANT

## 2017-01-01 PROCEDURE — 25010000002 CEFTRIAXONE PER 250 MG: Performed by: EMERGENCY MEDICINE

## 2017-01-01 PROCEDURE — 87086 URINE CULTURE/COLONY COUNT: CPT | Performed by: EMERGENCY MEDICINE

## 2017-01-01 PROCEDURE — 25010000002 HEPARIN (PORCINE) PER 1000 UNITS: Performed by: INTERNAL MEDICINE

## 2017-01-01 PROCEDURE — 81003 URINALYSIS AUTO W/O SCOPE: CPT | Performed by: PHYSICIAN ASSISTANT

## 2017-01-01 PROCEDURE — 25010000002 HYDROMORPHONE PER 4 MG

## 2017-01-01 PROCEDURE — 83036 HEMOGLOBIN GLYCOSYLATED A1C: CPT | Performed by: HOSPITALIST

## 2017-01-01 PROCEDURE — 99232 SBSQ HOSP IP/OBS MODERATE 35: CPT | Performed by: INTERNAL MEDICINE

## 2017-01-01 PROCEDURE — 25010000002 VANCOMYCIN PER 500 MG: Performed by: ORTHOPAEDIC SURGERY

## 2017-01-01 PROCEDURE — 25010000002 ROPIVACAINE PER 1 MG: Performed by: ORTHOPAEDIC SURGERY

## 2017-01-01 PROCEDURE — 93225 XTRNL ECG REC<48 HRS REC: CPT

## 2017-01-01 PROCEDURE — 25010000002 HYDROMORPHONE PER 4 MG: Performed by: EMERGENCY MEDICINE

## 2017-01-01 PROCEDURE — 94799 UNLISTED PULMONARY SVC/PX: CPT

## 2017-01-01 PROCEDURE — 25010000002 ONDANSETRON PER 1 MG: Performed by: EMERGENCY MEDICINE

## 2017-01-01 PROCEDURE — 25010000002 LORAZEPAM PER 2 MG: Performed by: HOSPITALIST

## 2017-01-01 PROCEDURE — 93005 ELECTROCARDIOGRAM TRACING: CPT | Performed by: HOSPITALIST

## 2017-01-01 PROCEDURE — 25010000002 PROPOFOL 10 MG/ML EMULSION: Performed by: NURSE ANESTHETIST, CERTIFIED REGISTERED

## 2017-01-01 PROCEDURE — 0 DIATRIZOATE MEGLUMINE & SODIUM PER 1 ML: Performed by: INTERNAL MEDICINE

## 2017-01-01 PROCEDURE — 85007 BL SMEAR W/DIFF WBC COUNT: CPT | Performed by: HOSPITALIST

## 2017-01-01 PROCEDURE — 99223 1ST HOSP IP/OBS HIGH 75: CPT | Performed by: INTERNAL MEDICINE

## 2017-01-01 PROCEDURE — 84466 ASSAY OF TRANSFERRIN: CPT | Performed by: INTERNAL MEDICINE

## 2017-01-01 PROCEDURE — 96365 THER/PROPH/DIAG IV INF INIT: CPT

## 2017-01-01 PROCEDURE — 73130 X-RAY EXAM OF HAND: CPT

## 2017-01-01 PROCEDURE — 83880 ASSAY OF NATRIURETIC PEPTIDE: CPT | Performed by: HOSPITALIST

## 2017-01-01 PROCEDURE — 63710000001 INSULIN ASPART PER 5 UNITS: Performed by: INTERNAL MEDICINE

## 2017-01-01 PROCEDURE — 25010000002 MORPHINE SULFATE (PF) 2 MG/ML SOLUTION: Performed by: HOSPITALIST

## 2017-01-01 PROCEDURE — 85045 AUTOMATED RETICULOCYTE COUNT: CPT | Performed by: HOSPITALIST

## 2017-01-01 PROCEDURE — 25010000002 NEOSTIGMINE PER 0.5 MG: Performed by: ANESTHESIOLOGY

## 2017-01-01 PROCEDURE — A9500 TC99M SESTAMIBI: HCPCS | Performed by: INTERNAL MEDICINE

## 2017-01-01 PROCEDURE — 25010000002 HALOPERIDOL LACTATE PER 5 MG: Performed by: HOSPITALIST

## 2017-01-01 PROCEDURE — 82607 VITAMIN B-12: CPT | Performed by: HOSPITALIST

## 2017-01-01 PROCEDURE — 99221 1ST HOSP IP/OBS SF/LOW 40: CPT | Performed by: INTERNAL MEDICINE

## 2017-01-01 PROCEDURE — 0 TECHNETIUM SESTAMIBI: Performed by: INTERNAL MEDICINE

## 2017-01-01 PROCEDURE — 80164 ASSAY DIPROPYLACETIC ACD TOT: CPT | Performed by: HOSPITALIST

## 2017-01-01 PROCEDURE — 80053 COMPREHEN METABOLIC PANEL: CPT | Performed by: HOSPITALIST

## 2017-01-01 PROCEDURE — 93306 TTE W/DOPPLER COMPLETE: CPT

## 2017-01-01 PROCEDURE — 80164 ASSAY DIPROPYLACETIC ACD TOT: CPT | Performed by: EMERGENCY MEDICINE

## 2017-01-01 PROCEDURE — 86923 COMPATIBILITY TEST ELECTRIC: CPT

## 2017-01-01 PROCEDURE — 82607 VITAMIN B-12: CPT | Performed by: INTERNAL MEDICINE

## 2017-01-01 PROCEDURE — 86901 BLOOD TYPING SEROLOGIC RH(D): CPT | Performed by: HOSPITALIST

## 2017-01-01 PROCEDURE — 83735 ASSAY OF MAGNESIUM: CPT | Performed by: NURSE PRACTITIONER

## 2017-01-01 PROCEDURE — 84439 ASSAY OF FREE THYROXINE: CPT | Performed by: INTERNAL MEDICINE

## 2017-01-01 PROCEDURE — 81001 URINALYSIS AUTO W/SCOPE: CPT | Performed by: PHYSICIAN ASSISTANT

## 2017-01-01 PROCEDURE — 25010000002 ONDANSETRON PER 1 MG: Performed by: ANESTHESIOLOGY

## 2017-01-01 PROCEDURE — 84145 PROCALCITONIN (PCT): CPT | Performed by: INTERNAL MEDICINE

## 2017-01-01 PROCEDURE — 72170 X-RAY EXAM OF PELVIS: CPT

## 2017-01-01 PROCEDURE — 83036 HEMOGLOBIN GLYCOSYLATED A1C: CPT | Performed by: INTERNAL MEDICINE

## 2017-01-01 PROCEDURE — 72125 CT NECK SPINE W/O DYE: CPT

## 2017-01-01 PROCEDURE — 85379 FIBRIN DEGRADATION QUANT: CPT | Performed by: EMERGENCY MEDICINE

## 2017-01-01 PROCEDURE — 25010000002 MIDAZOLAM PER 1 MG: Performed by: ANESTHESIOLOGY

## 2017-01-01 PROCEDURE — 82378 CARCINOEMBRYONIC ANTIGEN: CPT | Performed by: NURSE PRACTITIONER

## 2017-01-01 PROCEDURE — 25010000002 CLONIDINE PER 1 MG: Performed by: ORTHOPAEDIC SURGERY

## 2017-01-01 PROCEDURE — 83540 ASSAY OF IRON: CPT | Performed by: INTERNAL MEDICINE

## 2017-01-01 PROCEDURE — 25010000003 CEFAZOLIN IN DEXTROSE 2-4 GM/100ML-% SOLUTION: Performed by: ORTHOPAEDIC SURGERY

## 2017-01-01 DEVICE — ARTICUL/EZE FEMORAL HEAD DIAMETER 28MM +1.5 12/14 TAPER
Type: IMPLANTABLE DEVICE | Site: HIP | Status: FUNCTIONAL
Brand: ARTICUL/EZE

## 2017-01-01 DEVICE — PRT HIP BIPOL PRIM DEPUY 9525109/9525107: Type: IMPLANTABLE DEVICE | Site: HIP | Status: FUNCTIONAL

## 2017-01-01 DEVICE — CEMENTRALIZER STEM CENTRALIZER 10.0MM CEMENTED
Type: IMPLANTABLE DEVICE | Site: HIP | Status: FUNCTIONAL
Brand: CEMENTRALIZER

## 2017-01-01 DEVICE — SUMMIT FEMORAL STEM 12/14 TAPER CEMENTED SIZE 6 STD 127MM
Type: IMPLANTABLE DEVICE | Site: HIP | Status: FUNCTIONAL
Brand: SUMMIT

## 2017-01-01 DEVICE — SELF CENTERING BI-POLAR HEAD 28MM ID 51MM OD
Type: IMPLANTABLE DEVICE | Site: HIP | Status: FUNCTIONAL
Brand: SELF CENTERING

## 2017-01-01 RX ORDER — TRAZODONE HYDROCHLORIDE 100 MG/1
100 TABLET ORAL NIGHTLY
COMMUNITY
End: 2017-01-01

## 2017-01-01 RX ORDER — LORAZEPAM 2 MG/ML
2 CONCENTRATE ORAL
Status: DISCONTINUED | OUTPATIENT
Start: 2017-01-01 | End: 2017-01-01 | Stop reason: HOSPADM

## 2017-01-01 RX ORDER — DIPHENHYDRAMINE HYDROCHLORIDE 50 MG/ML
25 INJECTION INTRAMUSCULAR; INTRAVENOUS EVERY 6 HOURS PRN
Status: CANCELLED | OUTPATIENT
Start: 2017-01-01

## 2017-01-01 RX ORDER — ACETAMINOPHEN 650 MG/1
650 SUPPOSITORY RECTAL EVERY 4 HOURS PRN
Status: DISCONTINUED | OUTPATIENT
Start: 2017-01-01 | End: 2017-01-01 | Stop reason: HOSPADM

## 2017-01-01 RX ORDER — GABAPENTIN 300 MG/1
300 CAPSULE ORAL 4 TIMES DAILY
Status: ON HOLD | COMMUNITY
End: 2017-01-01

## 2017-01-01 RX ORDER — MORPHINE SULFATE 2 MG/ML
1 INJECTION, SOLUTION INTRAMUSCULAR; INTRAVENOUS EVERY 4 HOURS PRN
Status: DISPENSED | OUTPATIENT
Start: 2017-01-01 | End: 2017-01-01

## 2017-01-01 RX ORDER — PANTOPRAZOLE SODIUM 40 MG/10ML
80 INJECTION, POWDER, LYOPHILIZED, FOR SOLUTION INTRAVENOUS ONCE
Status: COMPLETED | OUTPATIENT
Start: 2017-01-01 | End: 2017-01-01

## 2017-01-01 RX ORDER — DIVALPROEX SODIUM 125 MG/1
250 CAPSULE, COATED PELLETS ORAL 3 TIMES DAILY
COMMUNITY

## 2017-01-01 RX ORDER — GABAPENTIN 300 MG/1
300 CAPSULE ORAL 3 TIMES DAILY
Status: DISCONTINUED | OUTPATIENT
Start: 2017-01-01 | End: 2017-01-01 | Stop reason: HOSPADM

## 2017-01-01 RX ORDER — HYDROMORPHONE HCL 110MG/55ML
2 PATIENT CONTROLLED ANALGESIA SYRINGE INTRAVENOUS
Status: CANCELLED | OUTPATIENT
Start: 2017-01-01 | End: 2017-11-12

## 2017-01-01 RX ORDER — TRANEXAMIC ACID 100 MG/ML
INJECTION, SOLUTION INTRAVENOUS AS NEEDED
Status: DISCONTINUED | OUTPATIENT
Start: 2017-01-01 | End: 2017-01-01 | Stop reason: HOSPADM

## 2017-01-01 RX ORDER — LORAZEPAM 2 MG/ML
0.5 CONCENTRATE ORAL
Status: DISCONTINUED | OUTPATIENT
Start: 2017-01-01 | End: 2017-01-01 | Stop reason: HOSPADM

## 2017-01-01 RX ORDER — ONDANSETRON 2 MG/ML
INJECTION INTRAMUSCULAR; INTRAVENOUS AS NEEDED
Status: DISCONTINUED | OUTPATIENT
Start: 2017-01-01 | End: 2017-01-01 | Stop reason: SURG

## 2017-01-01 RX ORDER — LORAZEPAM 2 MG/ML
1 INJECTION INTRAMUSCULAR
Status: CANCELLED | OUTPATIENT
Start: 2017-01-01 | End: 2017-11-12

## 2017-01-01 RX ORDER — LORAZEPAM 2 MG/ML
0.25 INJECTION INTRAMUSCULAR ONCE
Status: COMPLETED | OUTPATIENT
Start: 2017-01-01 | End: 2017-01-01

## 2017-01-01 RX ORDER — ACETAMINOPHEN 325 MG/1
650 TABLET ORAL EVERY 4 HOURS PRN
Status: DISCONTINUED | OUTPATIENT
Start: 2017-01-01 | End: 2017-01-01 | Stop reason: HOSPADM

## 2017-01-01 RX ORDER — HYDRALAZINE HYDROCHLORIDE 20 MG/ML
5 INJECTION INTRAMUSCULAR; INTRAVENOUS
Status: DISCONTINUED | OUTPATIENT
Start: 2017-01-01 | End: 2017-01-01 | Stop reason: HOSPADM

## 2017-01-01 RX ORDER — PROMETHAZINE HYDROCHLORIDE 25 MG/ML
12.5 INJECTION, SOLUTION INTRAMUSCULAR; INTRAVENOUS ONCE AS NEEDED
Status: DISCONTINUED | OUTPATIENT
Start: 2017-01-01 | End: 2017-01-01 | Stop reason: HOSPADM

## 2017-01-01 RX ORDER — PROMETHAZINE HYDROCHLORIDE 6.25 MG/5ML
6.25 SYRUP ORAL EVERY 4 HOURS PRN
Status: DISCONTINUED | OUTPATIENT
Start: 2017-01-01 | End: 2017-01-01 | Stop reason: HOSPADM

## 2017-01-01 RX ORDER — ACETAMINOPHEN 160 MG/5ML
650 SOLUTION ORAL EVERY 4 HOURS PRN
Status: CANCELLED | OUTPATIENT
Start: 2017-01-01

## 2017-01-01 RX ORDER — DIPHENOXYLATE HYDROCHLORIDE AND ATROPINE SULFATE 2.5; .025 MG/1; MG/1
1 TABLET ORAL
Status: DISCONTINUED | OUTPATIENT
Start: 2017-01-01 | End: 2017-01-01 | Stop reason: HOSPADM

## 2017-01-01 RX ORDER — ONDANSETRON 2 MG/ML
4 INJECTION INTRAMUSCULAR; INTRAVENOUS EVERY 4 HOURS PRN
Status: DISCONTINUED | OUTPATIENT
Start: 2017-01-01 | End: 2017-01-01

## 2017-01-01 RX ORDER — WARFARIN SODIUM 5 MG/1
2.5 TABLET ORAL
COMMUNITY

## 2017-01-01 RX ORDER — DIPHENHYDRAMINE HCL 25 MG
25 CAPSULE ORAL EVERY 6 HOURS PRN
Status: CANCELLED | OUTPATIENT
Start: 2017-01-01

## 2017-01-01 RX ORDER — LORAZEPAM 1 MG/1
2 TABLET ORAL
Status: DISCONTINUED | OUTPATIENT
Start: 2017-01-01 | End: 2017-01-01 | Stop reason: HOSPADM

## 2017-01-01 RX ORDER — HYDROMORPHONE HCL 110MG/55ML
1.5 PATIENT CONTROLLED ANALGESIA SYRINGE INTRAVENOUS
Status: DISCONTINUED | OUTPATIENT
Start: 2017-01-01 | End: 2017-01-01 | Stop reason: HOSPADM

## 2017-01-01 RX ORDER — ACETAMINOPHEN 160 MG/5ML
650 SOLUTION ORAL EVERY 4 HOURS PRN
Status: DISCONTINUED | OUTPATIENT
Start: 2017-01-01 | End: 2017-01-01 | Stop reason: HOSPADM

## 2017-01-01 RX ORDER — ACETAMINOPHEN 325 MG/1
650 TABLET ORAL EVERY 4 HOURS PRN
Status: CANCELLED | OUTPATIENT
Start: 2017-01-01

## 2017-01-01 RX ORDER — POTASSIUM CHLORIDE 7.45 MG/ML
10 INJECTION INTRAVENOUS
Status: COMPLETED | OUTPATIENT
Start: 2017-01-01 | End: 2017-01-01

## 2017-01-01 RX ORDER — GLYCOPYRROLATE 0.2 MG/ML
INJECTION INTRAMUSCULAR; INTRAVENOUS AS NEEDED
Status: DISCONTINUED | OUTPATIENT
Start: 2017-01-01 | End: 2017-01-01 | Stop reason: SURG

## 2017-01-01 RX ORDER — DIPHENOXYLATE HYDROCHLORIDE AND ATROPINE SULFATE 2.5; .025 MG/1; MG/1
1 TABLET ORAL
Status: CANCELLED | OUTPATIENT
Start: 2017-01-01

## 2017-01-01 RX ORDER — GABAPENTIN 300 MG/1
300 CAPSULE ORAL 3 TIMES DAILY
Qty: 90 CAPSULE | Refills: 0 | Status: SHIPPED | OUTPATIENT
Start: 2017-01-01

## 2017-01-01 RX ORDER — CEPHALEXIN 500 MG/1
500 CAPSULE ORAL 3 TIMES DAILY
Qty: 15 CAPSULE | Refills: 0 | Status: SHIPPED | OUTPATIENT
Start: 2017-01-01 | End: 2017-01-01 | Stop reason: HOSPADM

## 2017-01-01 RX ORDER — LORAZEPAM 1 MG/1
1 TABLET ORAL
Status: DISCONTINUED | OUTPATIENT
Start: 2017-01-01 | End: 2017-01-01 | Stop reason: HOSPADM

## 2017-01-01 RX ORDER — LORAZEPAM 2 MG/ML
2 CONCENTRATE ORAL
Status: CANCELLED | OUTPATIENT
Start: 2017-01-01 | End: 2017-11-12

## 2017-01-01 RX ORDER — HYDRALAZINE HYDROCHLORIDE 20 MG/ML
20 INJECTION INTRAMUSCULAR; INTRAVENOUS EVERY 6 HOURS PRN
Status: DISCONTINUED | OUTPATIENT
Start: 2017-01-01 | End: 2017-01-01

## 2017-01-01 RX ORDER — SODIUM CHLORIDE 0.9 % (FLUSH) 0.9 %
10 SYRINGE (ML) INJECTION AS NEEDED
Status: DISCONTINUED | OUTPATIENT
Start: 2017-01-01 | End: 2017-01-01 | Stop reason: HOSPADM

## 2017-01-01 RX ORDER — LORAZEPAM 1 MG/1
1 TABLET ORAL
Status: CANCELLED | OUTPATIENT
Start: 2017-01-01 | End: 2017-11-12

## 2017-01-01 RX ORDER — GLYCOPYRROLATE 0.2 MG/ML
0.2 INJECTION INTRAMUSCULAR; INTRAVENOUS
Status: DISCONTINUED | OUTPATIENT
Start: 2017-01-01 | End: 2017-01-01 | Stop reason: HOSPADM

## 2017-01-01 RX ORDER — LORAZEPAM 2 MG/ML
1 INJECTION INTRAMUSCULAR
Status: DISCONTINUED | OUTPATIENT
Start: 2017-01-01 | End: 2017-01-01 | Stop reason: HOSPADM

## 2017-01-01 RX ORDER — FERROUS SULFATE 300 MG/5ML
300 LIQUID (ML) ORAL 2 TIMES DAILY
Status: DISCONTINUED | OUTPATIENT
Start: 2017-01-01 | End: 2017-01-01 | Stop reason: HOSPADM

## 2017-01-01 RX ORDER — WARFARIN SODIUM 4 MG/1
4 TABLET ORAL
Status: DISCONTINUED | OUTPATIENT
Start: 2017-01-01 | End: 2017-01-01

## 2017-01-01 RX ORDER — SCOLOPAMINE TRANSDERMAL SYSTEM 1 MG/1
1 PATCH, EXTENDED RELEASE TRANSDERMAL
Status: CANCELLED | OUTPATIENT
Start: 2017-01-01

## 2017-01-01 RX ORDER — CHLORAL HYDRATE 500 MG
1000 CAPSULE ORAL
COMMUNITY

## 2017-01-01 RX ORDER — AMIODARONE HYDROCHLORIDE 200 MG/1
200 TABLET ORAL
Status: DISCONTINUED | OUTPATIENT
Start: 2017-01-01 | End: 2017-01-01

## 2017-01-01 RX ORDER — NITROGLYCERIN 0.4 MG/1
0.4 TABLET SUBLINGUAL
Status: DISCONTINUED | OUTPATIENT
Start: 2017-01-01 | End: 2017-01-01 | Stop reason: HOSPADM

## 2017-01-01 RX ORDER — METOPROLOL SUCCINATE 25 MG/1
12.5 TABLET, EXTENDED RELEASE ORAL
Status: DISCONTINUED | OUTPATIENT
Start: 2017-01-01 | End: 2017-01-01

## 2017-01-01 RX ORDER — SODIUM CHLORIDE 9 MG/ML
100 INJECTION, SOLUTION INTRAVENOUS CONTINUOUS
Status: DISCONTINUED | OUTPATIENT
Start: 2017-01-01 | End: 2017-01-01

## 2017-01-01 RX ORDER — HYDROMORPHONE HYDROCHLORIDE 1 MG/ML
0.5 INJECTION, SOLUTION INTRAMUSCULAR; INTRAVENOUS; SUBCUTANEOUS ONCE
Status: COMPLETED | OUTPATIENT
Start: 2017-01-01 | End: 2017-01-01

## 2017-01-01 RX ORDER — LORAZEPAM 2 MG/ML
1 CONCENTRATE ORAL
Status: DISCONTINUED | OUTPATIENT
Start: 2017-01-01 | End: 2017-01-01 | Stop reason: HOSPADM

## 2017-01-01 RX ORDER — FERROUS SULFATE 325(65) MG
325 TABLET ORAL 2 TIMES DAILY
Qty: 60 TABLET | Refills: 2 | Status: SHIPPED | OUTPATIENT
Start: 2017-01-01

## 2017-01-01 RX ORDER — DIPHENOXYLATE HYDROCHLORIDE AND ATROPINE SULFATE 2.5; .025 MG/1; MG/1
1 TABLET ORAL DAILY PRN
COMMUNITY
End: 2017-01-01 | Stop reason: HOSPADM

## 2017-01-01 RX ORDER — GLYCOPYRROLATE 0.2 MG/ML
0.4 INJECTION INTRAMUSCULAR; INTRAVENOUS
Status: DISCONTINUED | OUTPATIENT
Start: 2017-01-01 | End: 2017-01-01 | Stop reason: HOSPADM

## 2017-01-01 RX ORDER — LORAZEPAM 2 MG/ML
2 INJECTION INTRAMUSCULAR
Status: CANCELLED | OUTPATIENT
Start: 2017-01-01 | End: 2017-11-12

## 2017-01-01 RX ORDER — PROMETHAZINE HYDROCHLORIDE 25 MG/ML
6.25 INJECTION, SOLUTION INTRAMUSCULAR; INTRAVENOUS EVERY 4 HOURS PRN
Status: DISCONTINUED | OUTPATIENT
Start: 2017-01-01 | End: 2017-01-01 | Stop reason: HOSPADM

## 2017-01-01 RX ORDER — DEXTROSE MONOHYDRATE 25 G/50ML
25 INJECTION, SOLUTION INTRAVENOUS
Status: DISCONTINUED | OUTPATIENT
Start: 2017-01-01 | End: 2017-01-01 | Stop reason: HOSPADM

## 2017-01-01 RX ORDER — OLANZAPINE 10 MG/1
5 INJECTION, POWDER, LYOPHILIZED, FOR SOLUTION INTRAMUSCULAR 3 TIMES DAILY PRN
Status: COMPLETED | OUTPATIENT
Start: 2017-01-01 | End: 2017-01-01

## 2017-01-01 RX ORDER — NALOXONE HCL 0.4 MG/ML
0.2 VIAL (ML) INJECTION AS NEEDED
Status: DISCONTINUED | OUTPATIENT
Start: 2017-01-01 | End: 2017-01-01 | Stop reason: HOSPADM

## 2017-01-01 RX ORDER — SODIUM CHLORIDE, SODIUM LACTATE, POTASSIUM CHLORIDE, CALCIUM CHLORIDE 600; 310; 30; 20 MG/100ML; MG/100ML; MG/100ML; MG/100ML
9 INJECTION, SOLUTION INTRAVENOUS CONTINUOUS
Status: DISCONTINUED | OUTPATIENT
Start: 2017-01-01 | End: 2017-01-01

## 2017-01-01 RX ORDER — LORAZEPAM 2 MG/ML
2 INJECTION INTRAMUSCULAR
Status: DISCONTINUED | OUTPATIENT
Start: 2017-01-01 | End: 2017-01-01 | Stop reason: HOSPADM

## 2017-01-01 RX ORDER — FUROSEMIDE 20 MG/1
20 TABLET ORAL DAILY PRN
Qty: 30 TABLET | Refills: 0 | Status: SHIPPED | OUTPATIENT
Start: 2017-01-01 | End: 2017-01-01

## 2017-01-01 RX ORDER — NICOTINE POLACRILEX 4 MG
15 LOZENGE BUCCAL
Status: DISCONTINUED | OUTPATIENT
Start: 2017-01-01 | End: 2017-01-01 | Stop reason: HOSPADM

## 2017-01-01 RX ORDER — ACETAMINOPHEN 500 MG
1000 TABLET ORAL ONCE
Status: COMPLETED | OUTPATIENT
Start: 2017-01-01 | End: 2017-01-01

## 2017-01-01 RX ORDER — HYDROMORPHONE HYDROCHLORIDE 1 MG/ML
0.5 INJECTION, SOLUTION INTRAMUSCULAR; INTRAVENOUS; SUBCUTANEOUS
Status: DISCONTINUED | OUTPATIENT
Start: 2017-01-01 | End: 2017-01-01 | Stop reason: HOSPADM

## 2017-01-01 RX ORDER — LORAZEPAM 2 MG/ML
INJECTION INTRAMUSCULAR
Status: COMPLETED
Start: 2017-01-01 | End: 2017-01-01

## 2017-01-01 RX ORDER — GLYCOPYRROLATE 0.2 MG/ML
0.4 INJECTION INTRAMUSCULAR; INTRAVENOUS
Status: CANCELLED | OUTPATIENT
Start: 2017-01-01

## 2017-01-01 RX ORDER — VANCOMYCIN HYDROCHLORIDE 1 G/200ML
1 INJECTION, SOLUTION INTRAVENOUS ONCE
Status: COMPLETED | OUTPATIENT
Start: 2017-01-01 | End: 2017-01-01

## 2017-01-01 RX ORDER — WARFARIN SODIUM 2.5 MG/1
2.5 TABLET ORAL
Status: DISCONTINUED | OUTPATIENT
Start: 2017-01-01 | End: 2017-01-01 | Stop reason: HOSPADM

## 2017-01-01 RX ORDER — SODIUM CHLORIDE 0.9 % (FLUSH) 0.9 %
1-10 SYRINGE (ML) INJECTION AS NEEDED
Status: DISCONTINUED | OUTPATIENT
Start: 2017-01-01 | End: 2017-01-01 | Stop reason: HOSPADM

## 2017-01-01 RX ORDER — LORAZEPAM 2 MG/ML
0.5 INJECTION INTRAMUSCULAR
Status: DISCONTINUED | OUTPATIENT
Start: 2017-01-01 | End: 2017-01-01 | Stop reason: HOSPADM

## 2017-01-01 RX ORDER — WARFARIN SODIUM 1 MG/1
TABLET ORAL
Qty: 30 TABLET | Refills: 0 | Status: SHIPPED | OUTPATIENT
Start: 2017-01-01 | End: 2017-01-01 | Stop reason: HOSPADM

## 2017-01-01 RX ORDER — DIPHENHYDRAMINE HYDROCHLORIDE 50 MG/ML
25 INJECTION INTRAMUSCULAR; INTRAVENOUS EVERY 6 HOURS PRN
Status: DISCONTINUED | OUTPATIENT
Start: 2017-01-01 | End: 2017-01-01 | Stop reason: HOSPADM

## 2017-01-01 RX ORDER — MAGNESIUM SULFATE 1 G/100ML
1 INJECTION INTRAVENOUS AS NEEDED
Status: DISCONTINUED | OUTPATIENT
Start: 2017-01-01 | End: 2017-01-01

## 2017-01-01 RX ORDER — ASPIRIN 81 MG/1
81 TABLET ORAL DAILY
Status: DISCONTINUED | OUTPATIENT
Start: 2017-01-01 | End: 2017-01-01 | Stop reason: HOSPADM

## 2017-01-01 RX ORDER — SCOLOPAMINE TRANSDERMAL SYSTEM 1 MG/1
1 PATCH, EXTENDED RELEASE TRANSDERMAL
Status: DISCONTINUED | OUTPATIENT
Start: 2017-01-01 | End: 2017-01-01 | Stop reason: HOSPADM

## 2017-01-01 RX ORDER — WARFARIN SODIUM 5 MG/1
5 TABLET ORAL
Status: DISCONTINUED | OUTPATIENT
Start: 2017-01-01 | End: 2017-01-01

## 2017-01-01 RX ORDER — BUMETANIDE 2 MG/1
2 TABLET ORAL 2 TIMES DAILY
COMMUNITY
End: 2017-01-01 | Stop reason: HOSPADM

## 2017-01-01 RX ORDER — LORAZEPAM 2 MG/ML
0.5 CONCENTRATE ORAL
Status: CANCELLED | OUTPATIENT
Start: 2017-01-01 | End: 2017-11-12

## 2017-01-01 RX ORDER — DOCUSATE SODIUM 100 MG/1
100 CAPSULE, LIQUID FILLED ORAL 2 TIMES DAILY PRN
Status: DISCONTINUED | OUTPATIENT
Start: 2017-01-01 | End: 2017-01-01 | Stop reason: HOSPADM

## 2017-01-01 RX ORDER — CEFTRIAXONE SODIUM 1 G/50ML
1 INJECTION, SOLUTION INTRAVENOUS ONCE
Status: COMPLETED | OUTPATIENT
Start: 2017-01-01 | End: 2017-01-01

## 2017-01-01 RX ORDER — PROMETHAZINE HYDROCHLORIDE 6.25 MG/5ML
6.25 SYRUP ORAL EVERY 4 HOURS PRN
Status: CANCELLED | OUTPATIENT
Start: 2017-01-01

## 2017-01-01 RX ORDER — MAGNESIUM SULFATE HEPTAHYDRATE 40 MG/ML
2 INJECTION, SOLUTION INTRAVENOUS AS NEEDED
Status: DISCONTINUED | OUTPATIENT
Start: 2017-01-01 | End: 2017-01-01

## 2017-01-01 RX ORDER — DIVALPROEX SODIUM 125 MG/1
250 CAPSULE, COATED PELLETS ORAL 3 TIMES DAILY
Status: DISCONTINUED | OUTPATIENT
Start: 2017-01-01 | End: 2017-01-01 | Stop reason: HOSPADM

## 2017-01-01 RX ORDER — AMIODARONE HYDROCHLORIDE 200 MG/1
200 TABLET ORAL EVERY 12 HOURS SCHEDULED
Status: DISCONTINUED | OUTPATIENT
Start: 2017-01-01 | End: 2017-01-01

## 2017-01-01 RX ORDER — LEVOFLOXACIN 750 MG/1
750 TABLET ORAL DAILY
Qty: 3 TABLET | Refills: 0 | Status: SHIPPED | OUTPATIENT
Start: 2017-01-01 | End: 2017-01-01 | Stop reason: HOSPADM

## 2017-01-01 RX ORDER — LORAZEPAM 0.5 MG/1
0.25 TABLET ORAL EVERY 12 HOURS SCHEDULED
Status: DISCONTINUED | OUTPATIENT
Start: 2017-01-01 | End: 2017-01-01

## 2017-01-01 RX ORDER — HALOPERIDOL 5 MG/ML
1 INJECTION INTRAMUSCULAR ONCE
Status: COMPLETED | OUTPATIENT
Start: 2017-01-01 | End: 2017-01-01

## 2017-01-01 RX ORDER — HYDROMORPHONE HYDROCHLORIDE 1 MG/ML
0.25 INJECTION, SOLUTION INTRAMUSCULAR; INTRAVENOUS; SUBCUTANEOUS EVERY 4 HOURS PRN
Status: DISCONTINUED | OUTPATIENT
Start: 2017-01-01 | End: 2017-01-01

## 2017-01-01 RX ORDER — PROPOFOL 10 MG/ML
VIAL (ML) INTRAVENOUS AS NEEDED
Status: DISCONTINUED | OUTPATIENT
Start: 2017-01-01 | End: 2017-01-01 | Stop reason: SURG

## 2017-01-01 RX ORDER — PROMETHAZINE HYDROCHLORIDE 25 MG/1
25 TABLET ORAL ONCE AS NEEDED
Status: DISCONTINUED | OUTPATIENT
Start: 2017-01-01 | End: 2017-01-01 | Stop reason: HOSPADM

## 2017-01-01 RX ORDER — HYDROCODONE BITARTRATE AND ACETAMINOPHEN 7.5; 325 MG/1; MG/1
1 TABLET ORAL EVERY 4 HOURS PRN
COMMUNITY
End: 2017-01-01 | Stop reason: HOSPADM

## 2017-01-01 RX ORDER — NICOTINE POLACRILEX 4 MG
15 LOZENGE BUCCAL
Status: DISCONTINUED | OUTPATIENT
Start: 2017-01-01 | End: 2017-01-01

## 2017-01-01 RX ORDER — SODIUM CHLORIDE 9 MG/ML
75 INJECTION, SOLUTION INTRAVENOUS CONTINUOUS
Status: DISCONTINUED | OUTPATIENT
Start: 2017-01-01 | End: 2017-01-01

## 2017-01-01 RX ORDER — PROMETHAZINE HYDROCHLORIDE 25 MG/ML
6.25 INJECTION, SOLUTION INTRAMUSCULAR; INTRAVENOUS EVERY 4 HOURS PRN
Status: CANCELLED | OUTPATIENT
Start: 2017-01-01

## 2017-01-01 RX ORDER — MULTIPLE VITAMINS W/ MINERALS TAB 9MG-400MCG
1 TAB ORAL DAILY
Status: DISCONTINUED | OUTPATIENT
Start: 2017-01-01 | End: 2017-01-01 | Stop reason: HOSPADM

## 2017-01-01 RX ORDER — QUETIAPINE FUMARATE 25 MG/1
12.5 TABLET, FILM COATED ORAL ONCE
Status: DISCONTINUED | OUTPATIENT
Start: 2017-01-01 | End: 2017-01-01

## 2017-01-01 RX ORDER — HYDROCODONE BITARTRATE AND ACETAMINOPHEN 5; 325 MG/1; MG/1
1 TABLET ORAL EVERY 4 HOURS PRN
Qty: 20 TABLET | Refills: 0
Start: 2017-01-01

## 2017-01-01 RX ORDER — BISACODYL 5 MG/1
10 TABLET, DELAYED RELEASE ORAL DAILY PRN
Status: DISCONTINUED | OUTPATIENT
Start: 2017-01-01 | End: 2017-01-01 | Stop reason: HOSPADM

## 2017-01-01 RX ORDER — LORAZEPAM 0.5 MG/1
0.5 TABLET ORAL
Status: DISCONTINUED | OUTPATIENT
Start: 2017-01-01 | End: 2017-01-01 | Stop reason: HOSPADM

## 2017-01-01 RX ORDER — WARFARIN SODIUM 2.5 MG/1
2.5 TABLET ORAL
Status: DISCONTINUED | OUTPATIENT
Start: 2017-01-01 | End: 2017-01-01

## 2017-01-01 RX ORDER — PROMETHAZINE HYDROCHLORIDE 25 MG/1
12.5 TABLET ORAL ONCE AS NEEDED
Status: DISCONTINUED | OUTPATIENT
Start: 2017-01-01 | End: 2017-01-01 | Stop reason: HOSPADM

## 2017-01-01 RX ORDER — OLANZAPINE 10 MG/1
10 INJECTION, POWDER, LYOPHILIZED, FOR SOLUTION INTRAMUSCULAR 3 TIMES DAILY PRN
Status: COMPLETED | OUTPATIENT
Start: 2017-01-01 | End: 2017-01-01

## 2017-01-01 RX ORDER — MAGNESIUM HYDROXIDE 1200 MG/15ML
LIQUID ORAL AS NEEDED
Status: DISCONTINUED | OUTPATIENT
Start: 2017-01-01 | End: 2017-01-01 | Stop reason: HOSPADM

## 2017-01-01 RX ORDER — PROMETHAZINE HYDROCHLORIDE 12.5 MG/1
6.25 SUPPOSITORY RECTAL EVERY 4 HOURS PRN
Status: DISCONTINUED | OUTPATIENT
Start: 2017-01-01 | End: 2017-01-01 | Stop reason: HOSPADM

## 2017-01-01 RX ORDER — MIDAZOLAM HYDROCHLORIDE 1 MG/ML
1 INJECTION INTRAMUSCULAR; INTRAVENOUS
Status: DISCONTINUED | OUTPATIENT
Start: 2017-01-01 | End: 2017-01-01 | Stop reason: HOSPADM

## 2017-01-01 RX ORDER — WARFARIN SODIUM 7.5 MG/1
7.5 TABLET ORAL
Status: DISCONTINUED | OUTPATIENT
Start: 2017-01-01 | End: 2017-01-01

## 2017-01-01 RX ORDER — WARFARIN SODIUM 6 MG/1
6 TABLET ORAL
Status: DISCONTINUED | OUTPATIENT
Start: 2017-01-01 | End: 2017-01-01

## 2017-01-01 RX ORDER — NITROGLYCERIN 0.4 MG/1
0.4 TABLET SUBLINGUAL
COMMUNITY

## 2017-01-01 RX ORDER — LORAZEPAM 2 MG/ML
0.25 INJECTION INTRAMUSCULAR EVERY 6 HOURS PRN
Status: DISCONTINUED | OUTPATIENT
Start: 2017-01-01 | End: 2017-01-01 | Stop reason: HOSPADM

## 2017-01-01 RX ORDER — MIDAZOLAM HYDROCHLORIDE 1 MG/ML
2 INJECTION INTRAMUSCULAR; INTRAVENOUS
Status: DISCONTINUED | OUTPATIENT
Start: 2017-01-01 | End: 2017-01-01 | Stop reason: HOSPADM

## 2017-01-01 RX ORDER — LORAZEPAM 2 MG/ML
0.5 INJECTION INTRAMUSCULAR
Status: CANCELLED | OUTPATIENT
Start: 2017-01-01 | End: 2017-11-12

## 2017-01-01 RX ORDER — EPHEDRINE SULFATE 50 MG/ML
INJECTION, SOLUTION INTRAVENOUS AS NEEDED
Status: DISCONTINUED | OUTPATIENT
Start: 2017-01-01 | End: 2017-01-01 | Stop reason: SURG

## 2017-01-01 RX ORDER — HEPARIN SODIUM 5000 [USP'U]/ML
5000 INJECTION, SOLUTION INTRAVENOUS; SUBCUTANEOUS EVERY 8 HOURS SCHEDULED
Status: DISCONTINUED | OUTPATIENT
Start: 2017-01-01 | End: 2017-01-01

## 2017-01-01 RX ORDER — HYDROMORPHONE HCL 110MG/55ML
1.5 PATIENT CONTROLLED ANALGESIA SYRINGE INTRAVENOUS
Status: CANCELLED | OUTPATIENT
Start: 2017-01-01 | End: 2017-11-12

## 2017-01-01 RX ORDER — LORAZEPAM 2 MG/ML
1 CONCENTRATE ORAL
Status: CANCELLED | OUTPATIENT
Start: 2017-01-01 | End: 2017-11-12

## 2017-01-01 RX ORDER — FLUMAZENIL 0.1 MG/ML
0.2 INJECTION INTRAVENOUS AS NEEDED
Status: DISCONTINUED | OUTPATIENT
Start: 2017-01-01 | End: 2017-01-01 | Stop reason: HOSPADM

## 2017-01-01 RX ORDER — SODIUM CHLORIDE 9 MG/ML
50 INJECTION, SOLUTION INTRAVENOUS CONTINUOUS
Status: DISCONTINUED | OUTPATIENT
Start: 2017-01-01 | End: 2017-01-01

## 2017-01-01 RX ORDER — LIDOCAINE HYDROCHLORIDE 20 MG/ML
INJECTION, SOLUTION INFILTRATION; PERINEURAL AS NEEDED
Status: DISCONTINUED | OUTPATIENT
Start: 2017-01-01 | End: 2017-01-01 | Stop reason: SURG

## 2017-01-01 RX ORDER — DIPHENHYDRAMINE HYDROCHLORIDE 50 MG/ML
12.5 INJECTION INTRAMUSCULAR; INTRAVENOUS
Status: DISCONTINUED | OUTPATIENT
Start: 2017-01-01 | End: 2017-01-01 | Stop reason: HOSPADM

## 2017-01-01 RX ORDER — ACETAMINOPHEN 650 MG/1
650 SUPPOSITORY RECTAL EVERY 4 HOURS PRN
Status: DISCONTINUED | OUTPATIENT
Start: 2017-01-01 | End: 2017-01-01

## 2017-01-01 RX ORDER — WARFARIN SODIUM 1 MG/1
TABLET ORAL
Status: CANCELLED
Start: 2017-01-01

## 2017-01-01 RX ORDER — FENTANYL CITRATE 50 UG/ML
50 INJECTION, SOLUTION INTRAMUSCULAR; INTRAVENOUS
Status: DISCONTINUED | OUTPATIENT
Start: 2017-01-01 | End: 2017-01-01 | Stop reason: HOSPADM

## 2017-01-01 RX ORDER — PANTOPRAZOLE SODIUM 40 MG/1
40 TABLET, DELAYED RELEASE ORAL
Status: DISCONTINUED | OUTPATIENT
Start: 2017-01-01 | End: 2017-01-01

## 2017-01-01 RX ORDER — ONDANSETRON 2 MG/ML
4 INJECTION INTRAMUSCULAR; INTRAVENOUS ONCE AS NEEDED
Status: DISCONTINUED | OUTPATIENT
Start: 2017-01-01 | End: 2017-01-01 | Stop reason: HOSPADM

## 2017-01-01 RX ORDER — METOPROLOL SUCCINATE 100 MG/1
100 TABLET, EXTENDED RELEASE ORAL DAILY
COMMUNITY
End: 2017-01-01 | Stop reason: HOSPADM

## 2017-01-01 RX ORDER — DEXTROSE MONOHYDRATE 25 G/50ML
25 INJECTION, SOLUTION INTRAVENOUS
Status: DISCONTINUED | OUTPATIENT
Start: 2017-01-01 | End: 2017-01-01

## 2017-01-01 RX ORDER — FERROUS SULFATE 325(65) MG
325 TABLET ORAL 2 TIMES DAILY
Status: DISCONTINUED | OUTPATIENT
Start: 2017-01-01 | End: 2017-01-01 | Stop reason: HOSPADM

## 2017-01-01 RX ORDER — DEXTROSE AND SODIUM CHLORIDE 5; .45 G/100ML; G/100ML
100 INJECTION, SOLUTION INTRAVENOUS CONTINUOUS
Status: DISCONTINUED | OUTPATIENT
Start: 2017-01-01 | End: 2017-01-01

## 2017-01-01 RX ORDER — CEFAZOLIN SODIUM 2 G/100ML
2 INJECTION, SOLUTION INTRAVENOUS ONCE
Status: COMPLETED | OUTPATIENT
Start: 2017-01-01 | End: 2017-01-01

## 2017-01-01 RX ORDER — OLANZAPINE 10 MG/1
5 INJECTION, POWDER, LYOPHILIZED, FOR SOLUTION INTRAMUSCULAR 3 TIMES DAILY PRN
Status: DISCONTINUED | OUTPATIENT
Start: 2017-01-01 | End: 2017-01-01

## 2017-01-01 RX ORDER — HYDROMORPHONE HCL 110MG/55ML
2 PATIENT CONTROLLED ANALGESIA SYRINGE INTRAVENOUS
Status: DISCONTINUED | OUTPATIENT
Start: 2017-01-01 | End: 2017-01-01 | Stop reason: HOSPADM

## 2017-01-01 RX ORDER — ACETAMINOPHEN 650 MG/1
650 SUPPOSITORY RECTAL EVERY 4 HOURS PRN
Status: CANCELLED | OUTPATIENT
Start: 2017-01-01

## 2017-01-01 RX ORDER — DIPHENHYDRAMINE HCL 25 MG
25 CAPSULE ORAL EVERY 6 HOURS PRN
Status: DISCONTINUED | OUTPATIENT
Start: 2017-01-01 | End: 2017-01-01 | Stop reason: HOSPADM

## 2017-01-01 RX ORDER — GLYCOPYRROLATE 0.2 MG/ML
0.2 INJECTION INTRAMUSCULAR; INTRAVENOUS
Status: CANCELLED | OUTPATIENT
Start: 2017-01-01

## 2017-01-01 RX ORDER — ONDANSETRON 2 MG/ML
4 INJECTION INTRAMUSCULAR; INTRAVENOUS ONCE
Status: COMPLETED | OUTPATIENT
Start: 2017-01-01 | End: 2017-01-01

## 2017-01-01 RX ORDER — HYDROCODONE BITARTRATE AND ACETAMINOPHEN 7.5; 325 MG/1; MG/1
1 TABLET ORAL ONCE AS NEEDED
Status: DISCONTINUED | OUTPATIENT
Start: 2017-01-01 | End: 2017-01-01 | Stop reason: HOSPADM

## 2017-01-01 RX ORDER — FENTANYL CITRATE 50 UG/ML
INJECTION, SOLUTION INTRAMUSCULAR; INTRAVENOUS AS NEEDED
Status: DISCONTINUED | OUTPATIENT
Start: 2017-01-01 | End: 2017-01-01 | Stop reason: SURG

## 2017-01-01 RX ORDER — LORAZEPAM 2 MG/ML
0.5 INJECTION INTRAMUSCULAR EVERY 6 HOURS PRN
Status: DISCONTINUED | OUTPATIENT
Start: 2017-01-01 | End: 2017-01-01

## 2017-01-01 RX ORDER — PROMETHAZINE HYDROCHLORIDE 25 MG/1
25 SUPPOSITORY RECTAL ONCE AS NEEDED
Status: DISCONTINUED | OUTPATIENT
Start: 2017-01-01 | End: 2017-01-01 | Stop reason: HOSPADM

## 2017-01-01 RX ORDER — ALPRAZOLAM 0.25 MG/1
0.25 TABLET ORAL 2 TIMES DAILY PRN
COMMUNITY
End: 2017-01-01 | Stop reason: HOSPADM

## 2017-01-01 RX ORDER — EPHEDRINE SULFATE 50 MG/ML
5 INJECTION, SOLUTION INTRAVENOUS ONCE AS NEEDED
Status: DISCONTINUED | OUTPATIENT
Start: 2017-01-01 | End: 2017-01-01 | Stop reason: HOSPADM

## 2017-01-01 RX ORDER — PROMETHAZINE HYDROCHLORIDE 25 MG/1
6.25 TABLET ORAL EVERY 4 HOURS PRN
Status: CANCELLED | OUTPATIENT
Start: 2017-01-01

## 2017-01-01 RX ORDER — LORAZEPAM 0.5 MG/1
0.5 TABLET ORAL
Status: CANCELLED | OUTPATIENT
Start: 2017-01-01 | End: 2017-11-12

## 2017-01-01 RX ORDER — OLANZAPINE 10 MG/1
10 INJECTION, POWDER, LYOPHILIZED, FOR SOLUTION INTRAMUSCULAR EVERY 8 HOURS PRN
Status: DISCONTINUED | OUTPATIENT
Start: 2017-01-01 | End: 2017-01-01 | Stop reason: HOSPADM

## 2017-01-01 RX ORDER — LORAZEPAM 1 MG/1
2 TABLET ORAL
Status: CANCELLED | OUTPATIENT
Start: 2017-01-01 | End: 2017-11-12

## 2017-01-01 RX ORDER — LABETALOL HYDROCHLORIDE 5 MG/ML
5 INJECTION, SOLUTION INTRAVENOUS
Status: DISCONTINUED | OUTPATIENT
Start: 2017-01-01 | End: 2017-01-01 | Stop reason: HOSPADM

## 2017-01-01 RX ORDER — HYDROCODONE BITARTRATE AND ACETAMINOPHEN 5; 325 MG/1; MG/1
1 TABLET ORAL EVERY 4 HOURS PRN
Status: DISPENSED | OUTPATIENT
Start: 2017-01-01 | End: 2017-01-01

## 2017-01-01 RX ORDER — NALOXONE HCL 0.4 MG/ML
0.4 VIAL (ML) INJECTION
Status: DISCONTINUED | OUTPATIENT
Start: 2017-01-01 | End: 2017-01-01 | Stop reason: HOSPADM

## 2017-01-01 RX ORDER — LEVOFLOXACIN 5 MG/ML
750 INJECTION, SOLUTION INTRAVENOUS EVERY 24 HOURS
Status: DISCONTINUED | OUTPATIENT
Start: 2017-01-01 | End: 2017-01-01 | Stop reason: HOSPADM

## 2017-01-01 RX ORDER — METOPROLOL SUCCINATE 25 MG/1
25 TABLET, EXTENDED RELEASE ORAL
Status: DISCONTINUED | OUTPATIENT
Start: 2017-01-01 | End: 2017-01-01

## 2017-01-01 RX ORDER — HYDRALAZINE HYDROCHLORIDE 20 MG/ML
10 INJECTION INTRAMUSCULAR; INTRAVENOUS EVERY 6 HOURS PRN
Status: DISCONTINUED | OUTPATIENT
Start: 2017-01-01 | End: 2017-01-01 | Stop reason: HOSPADM

## 2017-01-01 RX ORDER — PROMETHAZINE HYDROCHLORIDE 12.5 MG/1
6.25 SUPPOSITORY RECTAL EVERY 4 HOURS PRN
Status: CANCELLED | OUTPATIENT
Start: 2017-01-01

## 2017-01-01 RX ORDER — ROCURONIUM BROMIDE 10 MG/ML
INJECTION, SOLUTION INTRAVENOUS AS NEEDED
Status: DISCONTINUED | OUTPATIENT
Start: 2017-01-01 | End: 2017-01-01 | Stop reason: SURG

## 2017-01-01 RX ORDER — PROMETHAZINE HYDROCHLORIDE 25 MG/1
6.25 TABLET ORAL EVERY 4 HOURS PRN
Status: DISCONTINUED | OUTPATIENT
Start: 2017-01-01 | End: 2017-01-01 | Stop reason: HOSPADM

## 2017-01-01 RX ORDER — OXYCODONE AND ACETAMINOPHEN 7.5; 325 MG/1; MG/1
1 TABLET ORAL ONCE AS NEEDED
Status: DISCONTINUED | OUTPATIENT
Start: 2017-01-01 | End: 2017-01-01 | Stop reason: HOSPADM

## 2017-01-01 RX ORDER — FAMOTIDINE 10 MG/ML
20 INJECTION, SOLUTION INTRAVENOUS ONCE
Status: COMPLETED | OUTPATIENT
Start: 2017-01-01 | End: 2017-01-01

## 2017-01-01 RX ORDER — TRAZODONE HYDROCHLORIDE 100 MG/1
100 TABLET ORAL NIGHTLY
Status: DISCONTINUED | OUTPATIENT
Start: 2017-01-01 | End: 2017-01-01 | Stop reason: HOSPADM

## 2017-01-01 RX ADMIN — METOPROLOL TARTRATE 12.5 MG: 25 TABLET ORAL at 13:51

## 2017-01-01 RX ADMIN — ACETAMINOPHEN 1000 MG: 500 TABLET ORAL at 14:32

## 2017-01-01 RX ADMIN — GLYCOPYRROLATE 0.4 MG: 0.2 INJECTION, SOLUTION INTRAMUSCULAR; INTRAVENOUS at 12:24

## 2017-01-01 RX ADMIN — SODIUM CHLORIDE 125 ML/HR: 9 INJECTION, SOLUTION INTRAVENOUS at 00:56

## 2017-01-01 RX ADMIN — FENTANYL CITRATE 50 MCG: 50 INJECTION INTRAMUSCULAR; INTRAVENOUS at 16:39

## 2017-01-01 RX ADMIN — HYDROMORPHONE HYDROCHLORIDE 1 MG: 1 INJECTION, SOLUTION INTRAMUSCULAR; INTRAVENOUS; SUBCUTANEOUS at 04:37

## 2017-01-01 RX ADMIN — DIVALPROEX SODIUM 250 MG: 125 CAPSULE, COATED PELLETS ORAL at 09:59

## 2017-01-01 RX ADMIN — METOPROLOL TARTRATE 25 MG: 25 TABLET ORAL at 21:05

## 2017-01-01 RX ADMIN — MORPHINE SULFATE 1 MG: 2 INJECTION, SOLUTION INTRAMUSCULAR; INTRAVENOUS at 05:33

## 2017-01-01 RX ADMIN — SODIUM CHLORIDE 125 ML/HR: 9 INJECTION, SOLUTION INTRAVENOUS at 01:08

## 2017-01-01 RX ADMIN — HYDROMORPHONE HYDROCHLORIDE 1 MG: 1 INJECTION, SOLUTION INTRAMUSCULAR; INTRAVENOUS; SUBCUTANEOUS at 20:48

## 2017-01-01 RX ADMIN — HYDROMORPHONE HYDROCHLORIDE 1 MG: 1 INJECTION, SOLUTION INTRAMUSCULAR; INTRAVENOUS; SUBCUTANEOUS at 21:54

## 2017-01-01 RX ADMIN — HYDROCODONE BITARTRATE AND ACETAMINOPHEN 1 TABLET: 5; 325 TABLET ORAL at 16:48

## 2017-01-01 RX ADMIN — HYDROCODONE BITARTRATE AND ACETAMINOPHEN 1 TABLET: 5; 325 TABLET ORAL at 03:38

## 2017-01-01 RX ADMIN — LORAZEPAM 2 MG: 2 INJECTION, SOLUTION INTRAMUSCULAR; INTRAVENOUS at 00:57

## 2017-01-01 RX ADMIN — HYDROMORPHONE HYDROCHLORIDE 1 MG: 1 INJECTION, SOLUTION INTRAMUSCULAR; INTRAVENOUS; SUBCUTANEOUS at 09:50

## 2017-01-01 RX ADMIN — LORAZEPAM 2 MG: 2 INJECTION INTRAMUSCULAR; INTRAVENOUS at 08:38

## 2017-01-01 RX ADMIN — FENTANYL CITRATE 100 MCG: 50 INJECTION INTRAMUSCULAR; INTRAVENOUS at 15:22

## 2017-01-01 RX ADMIN — AMIODARONE HYDROCHLORIDE 200 MG: 200 TABLET ORAL at 18:31

## 2017-01-01 RX ADMIN — AMIODARONE HYDROCHLORIDE 200 MG: 200 TABLET ORAL at 08:12

## 2017-01-01 RX ADMIN — FERROUS SULFATE TAB 325 MG (65 MG ELEMENTAL FE) 325 MG: 325 (65 FE) TAB at 17:54

## 2017-01-01 RX ADMIN — DIVALPROEX SODIUM 250 MG: 125 CAPSULE, COATED PELLETS ORAL at 07:45

## 2017-01-01 RX ADMIN — SODIUM CHLORIDE 8 MG/HR: 9 INJECTION, SOLUTION INTRAVENOUS at 03:50

## 2017-01-01 RX ADMIN — CEFTRIAXONE SODIUM 1 G: 1 INJECTION, SOLUTION INTRAVENOUS at 14:07

## 2017-01-01 RX ADMIN — LORAZEPAM 2 MG: 2 INJECTION INTRAMUSCULAR; INTRAVENOUS at 20:19

## 2017-01-01 RX ADMIN — HYDROMORPHONE HYDROCHLORIDE 1 MG: 1 INJECTION, SOLUTION INTRAMUSCULAR; INTRAVENOUS; SUBCUTANEOUS at 09:49

## 2017-01-01 RX ADMIN — LEVOFLOXACIN 750 MG: 5 INJECTION, SOLUTION INTRAVENOUS at 02:00

## 2017-01-01 RX ADMIN — LORAZEPAM 0.25 MG: 2 INJECTION INTRAMUSCULAR; INTRAVENOUS at 02:26

## 2017-01-01 RX ADMIN — ENOXAPARIN SODIUM 30 MG: 30 INJECTION SUBCUTANEOUS at 13:20

## 2017-01-01 RX ADMIN — GLYCOPYRROLATE 0.4 MG: 0.2 INJECTION, SOLUTION INTRAMUSCULAR; INTRAVENOUS at 04:37

## 2017-01-01 RX ADMIN — METOPROLOL TARTRATE 25 MG: 25 TABLET ORAL at 17:44

## 2017-01-01 RX ADMIN — HYDROMORPHONE HYDROCHLORIDE 1 MG: 1 INJECTION, SOLUTION INTRAMUSCULAR; INTRAVENOUS; SUBCUTANEOUS at 05:01

## 2017-01-01 RX ADMIN — MULTIPLE VITAMINS W/ MINERALS TAB 1 TABLET: TAB at 09:59

## 2017-01-01 RX ADMIN — METOPROLOL TARTRATE 25 MG: 25 TABLET ORAL at 07:45

## 2017-01-01 RX ADMIN — HYDROMORPHONE HYDROCHLORIDE 0.25 MG: 1 INJECTION, SOLUTION INTRAMUSCULAR; INTRAVENOUS; SUBCUTANEOUS at 09:45

## 2017-01-01 RX ADMIN — HYDROMORPHONE HYDROCHLORIDE 0.5 MG: 1 INJECTION, SOLUTION INTRAMUSCULAR; INTRAVENOUS; SUBCUTANEOUS at 07:29

## 2017-01-01 RX ADMIN — HYDROMORPHONE HYDROCHLORIDE 1 MG: 1 INJECTION, SOLUTION INTRAMUSCULAR; INTRAVENOUS; SUBCUTANEOUS at 00:38

## 2017-01-01 RX ADMIN — METOPROLOL TARTRATE 25 MG: 25 TABLET ORAL at 08:27

## 2017-01-01 RX ADMIN — METOPROLOL SUCCINATE 25 MG: 25 TABLET, FILM COATED, EXTENDED RELEASE ORAL at 10:19

## 2017-01-01 RX ADMIN — SODIUM CHLORIDE 8 MG/HR: 9 INJECTION, SOLUTION INTRAVENOUS at 04:29

## 2017-01-01 RX ADMIN — POTASSIUM CHLORIDE 10 MEQ: 10 INJECTION, SOLUTION INTRAVENOUS at 15:55

## 2017-01-01 RX ADMIN — WARFARIN SODIUM 2.5 MG: 2.5 TABLET ORAL at 18:35

## 2017-01-01 RX ADMIN — HYDROMORPHONE HYDROCHLORIDE 1 MG: 1 INJECTION, SOLUTION INTRAMUSCULAR; INTRAVENOUS; SUBCUTANEOUS at 05:05

## 2017-01-01 RX ADMIN — LORAZEPAM 1 MG: 2 SOLUTION, CONCENTRATE ORAL at 15:47

## 2017-01-01 RX ADMIN — LORAZEPAM 2 MG: 2 INJECTION INTRAMUSCULAR; INTRAVENOUS at 04:37

## 2017-01-01 RX ADMIN — HYDROMORPHONE HYDROCHLORIDE 1 MG: 1 INJECTION, SOLUTION INTRAMUSCULAR; INTRAVENOUS; SUBCUTANEOUS at 08:39

## 2017-01-01 RX ADMIN — LORAZEPAM 0.25 MG: 0.5 TABLET ORAL at 21:24

## 2017-01-01 RX ADMIN — HYDROMORPHONE HYDROCHLORIDE 0.25 MG: 1 INJECTION, SOLUTION INTRAMUSCULAR; INTRAVENOUS; SUBCUTANEOUS at 20:54

## 2017-01-01 RX ADMIN — HYDROMORPHONE HYDROCHLORIDE 1 MG: 1 INJECTION, SOLUTION INTRAMUSCULAR; INTRAVENOUS; SUBCUTANEOUS at 12:47

## 2017-01-01 RX ADMIN — CEFAZOLIN SODIUM 2 G: 2 INJECTION, SOLUTION INTRAVENOUS at 15:39

## 2017-01-01 RX ADMIN — LORAZEPAM 2 MG: 2 INJECTION, SOLUTION INTRAMUSCULAR; INTRAVENOUS at 21:54

## 2017-01-01 RX ADMIN — PHENYLEPHRINE HYDROCHLORIDE 100 MCG: 10 INJECTION INTRAVENOUS at 15:42

## 2017-01-01 RX ADMIN — HYDROCODONE BITARTRATE AND ACETAMINOPHEN 1 TABLET: 5; 325 TABLET ORAL at 11:47

## 2017-01-01 RX ADMIN — AMIODARONE HYDROCHLORIDE 0.5 MG/MIN: 1.8 INJECTION, SOLUTION INTRAVENOUS at 00:46

## 2017-01-01 RX ADMIN — HYDROMORPHONE HYDROCHLORIDE 1 MG: 1 INJECTION, SOLUTION INTRAMUSCULAR; INTRAVENOUS; SUBCUTANEOUS at 21:20

## 2017-01-01 RX ADMIN — MORPHINE SULFATE 1 MG: 2 INJECTION, SOLUTION INTRAMUSCULAR; INTRAVENOUS at 14:51

## 2017-01-01 RX ADMIN — LORAZEPAM 2 MG: 2 INJECTION, SOLUTION INTRAMUSCULAR; INTRAVENOUS at 08:16

## 2017-01-01 RX ADMIN — DIVALPROEX SODIUM 250 MG: 125 CAPSULE, COATED PELLETS ORAL at 21:52

## 2017-01-01 RX ADMIN — GABAPENTIN 300 MG: 300 CAPSULE ORAL at 07:45

## 2017-01-01 RX ADMIN — GLYCOPYRROLATE 0.4 MG: 0.2 INJECTION, SOLUTION INTRAMUSCULAR; INTRAVENOUS at 01:05

## 2017-01-01 RX ADMIN — LORAZEPAM 2 MG: 2 INJECTION, SOLUTION INTRAMUSCULAR; INTRAVENOUS at 13:11

## 2017-01-01 RX ADMIN — PHENYLEPHRINE HYDROCHLORIDE 100 MCG: 10 INJECTION INTRAVENOUS at 16:24

## 2017-01-01 RX ADMIN — FERROUS SULFATE TAB 325 MG (65 MG ELEMENTAL FE) 325 MG: 325 (65 FE) TAB at 18:20

## 2017-01-01 RX ADMIN — WARFARIN SODIUM 2.5 MG: 2.5 TABLET ORAL at 21:05

## 2017-01-01 RX ADMIN — GLYCOPYRROLATE 0.4 MG: 0.2 INJECTION, SOLUTION INTRAMUSCULAR; INTRAVENOUS at 15:25

## 2017-01-01 RX ADMIN — SODIUM CHLORIDE 8 MG/HR: 9 INJECTION, SOLUTION INTRAVENOUS at 16:22

## 2017-01-01 RX ADMIN — POTASSIUM CHLORIDE 10 MEQ: 10 INJECTION, SOLUTION INTRAVENOUS at 14:42

## 2017-01-01 RX ADMIN — HALOPERIDOL LACTATE 1 MG: 5 INJECTION, SOLUTION INTRAMUSCULAR at 23:12

## 2017-01-01 RX ADMIN — OLANZAPINE 10 MG: 10 INJECTION, POWDER, FOR SOLUTION INTRAMUSCULAR at 17:53

## 2017-01-01 RX ADMIN — HYDROMORPHONE HYDROCHLORIDE 1 MG: 1 INJECTION, SOLUTION INTRAMUSCULAR; INTRAVENOUS; SUBCUTANEOUS at 16:11

## 2017-01-01 RX ADMIN — PHENYLEPHRINE HYDROCHLORIDE 100 MCG: 10 INJECTION INTRAVENOUS at 16:30

## 2017-01-01 RX ADMIN — VANCOMYCIN HYDROCHLORIDE 1 G: 1 INJECTION, SOLUTION INTRAVENOUS at 14:55

## 2017-01-01 RX ADMIN — ASPIRIN 81 MG: 81 TABLET ORAL at 08:56

## 2017-01-01 RX ADMIN — AMIODARONE HYDROCHLORIDE 200 MG: 200 TABLET ORAL at 10:19

## 2017-01-01 RX ADMIN — METOPROLOL TARTRATE 25 MG: 25 TABLET ORAL at 16:44

## 2017-01-01 RX ADMIN — METOPROLOL TARTRATE 25 MG: 25 TABLET ORAL at 17:53

## 2017-01-01 RX ADMIN — SODIUM CHLORIDE 1000 ML: 9 INJECTION, SOLUTION INTRAVENOUS at 11:45

## 2017-01-01 RX ADMIN — AMIODARONE HYDROCHLORIDE 200 MG: 200 TABLET ORAL at 20:20

## 2017-01-01 RX ADMIN — LORAZEPAM 2 MG: 2 INJECTION, SOLUTION INTRAMUSCULAR; INTRAVENOUS at 05:00

## 2017-01-01 RX ADMIN — METOPROLOL TARTRATE 25 MG: 25 TABLET ORAL at 08:12

## 2017-01-01 RX ADMIN — GABAPENTIN 300 MG: 300 CAPSULE ORAL at 08:56

## 2017-01-01 RX ADMIN — LORAZEPAM 2 MG: 2 INJECTION, SOLUTION INTRAMUSCULAR; INTRAVENOUS at 04:55

## 2017-01-01 RX ADMIN — MIDAZOLAM 0.5 MG: 1 INJECTION INTRAMUSCULAR; INTRAVENOUS at 15:05

## 2017-01-01 RX ADMIN — DIVALPROEX SODIUM 250 MG: 125 CAPSULE, COATED PELLETS ORAL at 18:20

## 2017-01-01 RX ADMIN — LORAZEPAM 2 MG: 2 INJECTION, SOLUTION INTRAMUSCULAR; INTRAVENOUS at 00:49

## 2017-01-01 RX ADMIN — LORAZEPAM 1 MG: 2 INJECTION, SOLUTION INTRAMUSCULAR; INTRAVENOUS at 18:39

## 2017-01-01 RX ADMIN — HYDROMORPHONE HYDROCHLORIDE 0.25 MG: 1 INJECTION, SOLUTION INTRAMUSCULAR; INTRAVENOUS; SUBCUTANEOUS at 18:21

## 2017-01-01 RX ADMIN — GABAPENTIN 300 MG: 300 CAPSULE ORAL at 08:27

## 2017-01-01 RX ADMIN — MORPHINE SULFATE 1 MG: 2 INJECTION, SOLUTION INTRAMUSCULAR; INTRAVENOUS at 10:48

## 2017-01-01 RX ADMIN — EPHEDRINE SULFATE 10 MG: 50 INJECTION INTRAMUSCULAR; INTRAVENOUS; SUBCUTANEOUS at 16:33

## 2017-01-01 RX ADMIN — AMIODARONE HYDROCHLORIDE 1 MG/MIN: 1.8 INJECTION, SOLUTION INTRAVENOUS at 19:00

## 2017-01-01 RX ADMIN — AMIODARONE HYDROCHLORIDE 0.5 MG/MIN: 1.8 INJECTION, SOLUTION INTRAVENOUS at 12:54

## 2017-01-01 RX ADMIN — LORAZEPAM 0.25 MG: 2 INJECTION INTRAMUSCULAR; INTRAVENOUS at 12:36

## 2017-01-01 RX ADMIN — DEXTROSE AND SODIUM CHLORIDE 100 ML/HR: 5; 450 INJECTION, SOLUTION INTRAVENOUS at 13:44

## 2017-01-01 RX ADMIN — WARFARIN SODIUM 7.5 MG: 7.5 TABLET ORAL at 17:44

## 2017-01-01 RX ADMIN — FERROUS SULFATE TAB 325 MG (65 MG ELEMENTAL FE) 325 MG: 325 (65 FE) TAB at 17:44

## 2017-01-01 RX ADMIN — MORPHINE SULFATE 1 MG: 2 INJECTION, SOLUTION INTRAMUSCULAR; INTRAVENOUS at 04:06

## 2017-01-01 RX ADMIN — TECHNETIUM TC-99M SESTAMIBI 1 DOSE: 1 INJECTION INTRAVENOUS at 06:35

## 2017-01-01 RX ADMIN — NEOSTIGMINE METHYLSULFATE 4 MG: 1 INJECTION INTRAMUSCULAR; INTRAVENOUS; SUBCUTANEOUS at 16:35

## 2017-01-01 RX ADMIN — ONDANSETRON 4 MG: 2 INJECTION INTRAMUSCULAR; INTRAVENOUS at 07:28

## 2017-01-01 RX ADMIN — HYDROMORPHONE HYDROCHLORIDE 1 MG: 1 INJECTION, SOLUTION INTRAMUSCULAR; INTRAVENOUS; SUBCUTANEOUS at 01:05

## 2017-01-01 RX ADMIN — ASPIRIN 81 MG: 81 TABLET ORAL at 07:45

## 2017-01-01 RX ADMIN — PANTOPRAZOLE SODIUM 40 MG: 40 TABLET, DELAYED RELEASE ORAL at 06:21

## 2017-01-01 RX ADMIN — HYDROMORPHONE HYDROCHLORIDE 1 MG: 1 INJECTION, SOLUTION INTRAMUSCULAR; INTRAVENOUS; SUBCUTANEOUS at 08:57

## 2017-01-01 RX ADMIN — SODIUM CHLORIDE 2721 ML: 0.9 INJECTION, SOLUTION INTRAVENOUS at 13:34

## 2017-01-01 RX ADMIN — GABAPENTIN 300 MG: 300 CAPSULE ORAL at 17:53

## 2017-01-01 RX ADMIN — DEXTROSE AND SODIUM CHLORIDE 100 ML/HR: 5; 450 INJECTION, SOLUTION INTRAVENOUS at 11:28

## 2017-01-01 RX ADMIN — GABAPENTIN 300 MG: 300 CAPSULE ORAL at 21:53

## 2017-01-01 RX ADMIN — LORAZEPAM 2 MG: 2 INJECTION INTRAMUSCULAR; INTRAVENOUS at 09:51

## 2017-01-01 RX ADMIN — HYDROMORPHONE HYDROCHLORIDE 1 MG: 1 INJECTION, SOLUTION INTRAMUSCULAR; INTRAVENOUS; SUBCUTANEOUS at 09:51

## 2017-01-01 RX ADMIN — LORAZEPAM 0.25 MG: 2 INJECTION INTRAMUSCULAR; INTRAVENOUS at 17:07

## 2017-01-01 RX ADMIN — LORAZEPAM 2 MG: 2 INJECTION, SOLUTION INTRAMUSCULAR; INTRAVENOUS at 21:20

## 2017-01-01 RX ADMIN — LORAZEPAM 0.5 MG: 2 INJECTION, SOLUTION INTRAMUSCULAR; INTRAVENOUS at 03:17

## 2017-01-01 RX ADMIN — HYDROMORPHONE HYDROCHLORIDE 1 MG: 1 INJECTION, SOLUTION INTRAMUSCULAR; INTRAVENOUS; SUBCUTANEOUS at 21:26

## 2017-01-01 RX ADMIN — PROPOFOL 150 MG: 10 INJECTION, EMULSION INTRAVENOUS at 15:22

## 2017-01-01 RX ADMIN — DEXTROSE AND SODIUM CHLORIDE 100 ML/HR: 5; 450 INJECTION, SOLUTION INTRAVENOUS at 09:19

## 2017-01-01 RX ADMIN — HYDROCODONE BITARTRATE AND ACETAMINOPHEN 1 TABLET: 5; 325 TABLET ORAL at 21:05

## 2017-01-01 RX ADMIN — SODIUM CHLORIDE 500 ML: 9 INJECTION, SOLUTION INTRAVENOUS at 14:29

## 2017-01-01 RX ADMIN — LORAZEPAM 0.25 MG: 2 INJECTION INTRAMUSCULAR; INTRAVENOUS at 20:00

## 2017-01-01 RX ADMIN — GABAPENTIN 300 MG: 300 CAPSULE ORAL at 21:00

## 2017-01-01 RX ADMIN — GLYCOPYRROLATE 0.4 MG: 0.2 INJECTION, SOLUTION INTRAMUSCULAR; INTRAVENOUS at 21:53

## 2017-01-01 RX ADMIN — ONDANSETRON 4 MG: 2 INJECTION INTRAMUSCULAR; INTRAVENOUS at 16:25

## 2017-01-01 RX ADMIN — HYDROMORPHONE HYDROCHLORIDE 1 MG: 1 INJECTION, SOLUTION INTRAMUSCULAR; INTRAVENOUS; SUBCUTANEOUS at 15:25

## 2017-01-01 RX ADMIN — WARFARIN SODIUM 2.5 MG: 2.5 TABLET ORAL at 17:12

## 2017-01-01 RX ADMIN — METOPROLOL TARTRATE 12.5 MG: 25 TABLET ORAL at 20:47

## 2017-01-01 RX ADMIN — LORAZEPAM 2 MG: 2 INJECTION INTRAMUSCULAR; INTRAVENOUS at 05:06

## 2017-01-01 RX ADMIN — HYDROMORPHONE HYDROCHLORIDE 1 MG: 1 INJECTION, SOLUTION INTRAMUSCULAR; INTRAVENOUS; SUBCUTANEOUS at 16:57

## 2017-01-01 RX ADMIN — NITROGLYCERIN 0.4 MG: 0.4 TABLET SUBLINGUAL at 02:46

## 2017-01-01 RX ADMIN — MINERAL SUPPLEMENT IRON 300 MG / 5 ML STRENGTH LIQUID 100 PER BOX UNFLAVORED 300 MG: at 13:18

## 2017-01-01 RX ADMIN — PANTOPRAZOLE SODIUM 40 MG: 40 TABLET, DELAYED RELEASE ORAL at 18:33

## 2017-01-01 RX ADMIN — SODIUM CHLORIDE 8 MG/HR: 9 INJECTION, SOLUTION INTRAVENOUS at 11:42

## 2017-01-01 RX ADMIN — ASPIRIN 81 MG: 81 TABLET ORAL at 09:59

## 2017-01-01 RX ADMIN — SODIUM CHLORIDE 8 MG/HR: 9 INJECTION, SOLUTION INTRAVENOUS at 21:59

## 2017-01-01 RX ADMIN — METOPROLOL SUCCINATE 12.5 MG: 25 TABLET, FILM COATED, EXTENDED RELEASE ORAL at 08:12

## 2017-01-01 RX ADMIN — METOPROLOL TARTRATE 25 MG: 25 TABLET ORAL at 09:59

## 2017-01-01 RX ADMIN — LORAZEPAM 0.25 MG: 0.5 TABLET ORAL at 20:19

## 2017-01-01 RX ADMIN — FERROUS SULFATE TAB 325 MG (65 MG ELEMENTAL FE) 325 MG: 325 (65 FE) TAB at 09:07

## 2017-01-01 RX ADMIN — DIVALPROEX SODIUM 250 MG: 125 CAPSULE, COATED PELLETS ORAL at 08:15

## 2017-01-01 RX ADMIN — ASPIRIN 81 MG: 81 TABLET ORAL at 08:27

## 2017-01-01 RX ADMIN — HYDRALAZINE HYDROCHLORIDE 10 MG: 20 INJECTION INTRAMUSCULAR; INTRAVENOUS at 04:45

## 2017-01-01 RX ADMIN — GABAPENTIN 300 MG: 300 CAPSULE ORAL at 09:59

## 2017-01-01 RX ADMIN — FAMOTIDINE 20 MG: 10 INJECTION, SOLUTION INTRAVENOUS at 15:05

## 2017-01-01 RX ADMIN — METOPROLOL TARTRATE 25 MG: 25 TABLET ORAL at 20:10

## 2017-01-01 RX ADMIN — GLYCOPYRROLATE 0.4 MG: 0.2 INJECTION, SOLUTION INTRAMUSCULAR; INTRAVENOUS at 08:38

## 2017-01-01 RX ADMIN — LORAZEPAM 2 MG: 2 INJECTION INTRAMUSCULAR; INTRAVENOUS at 09:50

## 2017-01-01 RX ADMIN — HYDROMORPHONE HYDROCHLORIDE 0.25 MG: 1 INJECTION, SOLUTION INTRAMUSCULAR; INTRAVENOUS; SUBCUTANEOUS at 20:47

## 2017-01-01 RX ADMIN — LEVOFLOXACIN 750 MG: 5 INJECTION, SOLUTION INTRAVENOUS at 01:29

## 2017-01-01 RX ADMIN — LIDOCAINE HYDROCHLORIDE 100 MG: 20 INJECTION, SOLUTION INFILTRATION; PERINEURAL at 15:22

## 2017-01-01 RX ADMIN — HYDROMORPHONE HYDROCHLORIDE 1 MG: 1 INJECTION, SOLUTION INTRAMUSCULAR; INTRAVENOUS; SUBCUTANEOUS at 17:15

## 2017-01-01 RX ADMIN — HYDROMORPHONE HYDROCHLORIDE 1 MG: 1 INJECTION, SOLUTION INTRAMUSCULAR; INTRAVENOUS; SUBCUTANEOUS at 19:48

## 2017-01-01 RX ADMIN — SODIUM CHLORIDE 100 ML/HR: 9 INJECTION, SOLUTION INTRAVENOUS at 10:41

## 2017-01-01 RX ADMIN — HYDROMORPHONE HYDROCHLORIDE 1 MG: 1 INJECTION, SOLUTION INTRAMUSCULAR; INTRAVENOUS; SUBCUTANEOUS at 12:23

## 2017-01-01 RX ADMIN — SODIUM CHLORIDE 50 ML/HR: 9 INJECTION, SOLUTION INTRAVENOUS at 18:02

## 2017-01-01 RX ADMIN — ENOXAPARIN SODIUM 30 MG: 30 INJECTION SUBCUTANEOUS at 12:22

## 2017-01-01 RX ADMIN — LORAZEPAM 2 MG: 2 INJECTION, SOLUTION INTRAMUSCULAR; INTRAVENOUS at 16:11

## 2017-01-01 RX ADMIN — INSULIN ASPART 2 UNITS: 100 INJECTION, SOLUTION INTRAVENOUS; SUBCUTANEOUS at 21:47

## 2017-01-01 RX ADMIN — LORAZEPAM 2 MG: 2 INJECTION, SOLUTION INTRAMUSCULAR; INTRAVENOUS at 20:48

## 2017-01-01 RX ADMIN — HYDROMORPHONE HYDROCHLORIDE 1 MG: 1 INJECTION, SOLUTION INTRAMUSCULAR; INTRAVENOUS; SUBCUTANEOUS at 22:11

## 2017-01-01 RX ADMIN — SODIUM CHLORIDE 1000 ML: 9 INJECTION, SOLUTION INTRAVENOUS at 19:34

## 2017-01-01 RX ADMIN — SODIUM CHLORIDE 8 MG/HR: 9 INJECTION, SOLUTION INTRAVENOUS at 22:49

## 2017-01-01 RX ADMIN — HYDROMORPHONE HYDROCHLORIDE 1 MG: 1 INJECTION, SOLUTION INTRAMUSCULAR; INTRAVENOUS; SUBCUTANEOUS at 00:40

## 2017-01-01 RX ADMIN — LORAZEPAM 2 MG: 2 INJECTION INTRAMUSCULAR; INTRAVENOUS at 09:49

## 2017-01-01 RX ADMIN — SODIUM CHLORIDE 1000 ML: 9 INJECTION, SOLUTION INTRAVENOUS at 12:55

## 2017-01-01 RX ADMIN — SCOPALAMINE 1 PATCH: 1 PATCH, EXTENDED RELEASE TRANSDERMAL at 18:50

## 2017-01-01 RX ADMIN — HYDROMORPHONE HYDROCHLORIDE 1 MG: 1 INJECTION, SOLUTION INTRAMUSCULAR; INTRAVENOUS; SUBCUTANEOUS at 18:49

## 2017-01-01 RX ADMIN — SODIUM CHLORIDE 8 MG/HR: 9 INJECTION, SOLUTION INTRAVENOUS at 06:51

## 2017-01-01 RX ADMIN — GLYCOPYRROLATE 0.4 MG: 0.2 INJECTION, SOLUTION INTRAMUSCULAR; INTRAVENOUS at 00:40

## 2017-01-01 RX ADMIN — WARFARIN SODIUM 7.5 MG: 7.5 TABLET ORAL at 17:53

## 2017-01-01 RX ADMIN — HYDROMORPHONE HYDROCHLORIDE 1 MG: 1 INJECTION, SOLUTION INTRAMUSCULAR; INTRAVENOUS; SUBCUTANEOUS at 04:55

## 2017-01-01 RX ADMIN — LORAZEPAM 2 MG: 2 INJECTION, SOLUTION INTRAMUSCULAR; INTRAVENOUS at 17:15

## 2017-01-01 RX ADMIN — HYDROMORPHONE HYDROCHLORIDE 1 MG: 1 INJECTION, SOLUTION INTRAMUSCULAR; INTRAVENOUS; SUBCUTANEOUS at 04:34

## 2017-01-01 RX ADMIN — GLYCOPYRROLATE 0.4 MG: 0.2 INJECTION, SOLUTION INTRAMUSCULAR; INTRAVENOUS at 09:20

## 2017-01-01 RX ADMIN — SODIUM CHLORIDE 8 MG/HR: 9 INJECTION, SOLUTION INTRAVENOUS at 16:34

## 2017-01-01 RX ADMIN — MORPHINE SULFATE 1 MG: 2 INJECTION, SOLUTION INTRAMUSCULAR; INTRAVENOUS at 13:11

## 2017-01-01 RX ADMIN — HYDROMORPHONE HYDROCHLORIDE 1 MG: 1 INJECTION, SOLUTION INTRAMUSCULAR; INTRAVENOUS; SUBCUTANEOUS at 18:25

## 2017-01-01 RX ADMIN — FERROUS SULFATE TAB 325 MG (65 MG ELEMENTAL FE) 325 MG: 325 (65 FE) TAB at 08:17

## 2017-01-01 RX ADMIN — ENOXAPARIN SODIUM 30 MG: 30 INJECTION SUBCUTANEOUS at 11:53

## 2017-01-01 RX ADMIN — MORPHINE SULFATE 1 MG: 2 INJECTION, SOLUTION INTRAMUSCULAR; INTRAVENOUS at 22:31

## 2017-01-01 RX ADMIN — DIVALPROEX SODIUM 250 MG: 125 CAPSULE, COATED PELLETS ORAL at 21:07

## 2017-01-01 RX ADMIN — SODIUM CHLORIDE 8 MG/HR: 9 INJECTION, SOLUTION INTRAVENOUS at 16:21

## 2017-01-01 RX ADMIN — LORAZEPAM 0.25 MG: 0.5 TABLET ORAL at 19:54

## 2017-01-01 RX ADMIN — GLYCOPYRROLATE 0.4 MG: 0.2 INJECTION, SOLUTION INTRAMUSCULAR; INTRAVENOUS at 20:19

## 2017-01-01 RX ADMIN — PHENYLEPHRINE HYDROCHLORIDE 100 MCG: 10 INJECTION INTRAVENOUS at 16:14

## 2017-01-01 RX ADMIN — LORAZEPAM 2 MG: 2 INJECTION INTRAMUSCULAR; INTRAVENOUS at 21:26

## 2017-01-01 RX ADMIN — ROCURONIUM BROMIDE 40 MG: 10 INJECTION INTRAVENOUS at 15:22

## 2017-01-01 RX ADMIN — LORAZEPAM 0.25 MG: 0.5 TABLET ORAL at 13:59

## 2017-01-01 RX ADMIN — WARFARIN SODIUM 7.5 MG: 7.5 TABLET ORAL at 18:20

## 2017-01-01 RX ADMIN — ENOXAPARIN SODIUM 40 MG: 40 INJECTION SUBCUTANEOUS at 08:56

## 2017-01-01 RX ADMIN — LORAZEPAM 2 MG: 2 INJECTION INTRAMUSCULAR; INTRAVENOUS at 12:47

## 2017-01-01 RX ADMIN — GLYCOPYRROLATE 0.4 MG: 0.2 INJECTION, SOLUTION INTRAMUSCULAR; INTRAVENOUS at 16:57

## 2017-01-01 RX ADMIN — HYDROCODONE BITARTRATE AND ACETAMINOPHEN 1 TABLET: 5; 325 TABLET ORAL at 10:00

## 2017-01-01 RX ADMIN — GABAPENTIN 300 MG: 300 CAPSULE ORAL at 17:44

## 2017-01-01 RX ADMIN — TRAZODONE HYDROCHLORIDE 100 MG: 100 TABLET, FILM COATED ORAL at 21:52

## 2017-01-01 RX ADMIN — HYDROMORPHONE HYDROCHLORIDE 1 MG: 1 INJECTION, SOLUTION INTRAMUSCULAR; INTRAVENOUS; SUBCUTANEOUS at 16:55

## 2017-01-01 RX ADMIN — GLYCOPYRROLATE 0.4 MG: 0.2 INJECTION, SOLUTION INTRAMUSCULAR; INTRAVENOUS at 12:40

## 2017-01-01 RX ADMIN — DIVALPROEX SODIUM 250 MG: 125 CAPSULE, COATED PELLETS ORAL at 17:53

## 2017-01-01 RX ADMIN — GABAPENTIN 300 MG: 300 CAPSULE ORAL at 18:20

## 2017-01-01 RX ADMIN — GLYCOPYRROLATE 0.4 MG: 0.2 INJECTION, SOLUTION INTRAMUSCULAR; INTRAVENOUS at 12:45

## 2017-01-01 RX ADMIN — SODIUM CHLORIDE 125 ML/HR: 9 INJECTION, SOLUTION INTRAVENOUS at 09:32

## 2017-01-01 RX ADMIN — METOPROLOL TARTRATE 25 MG: 25 TABLET ORAL at 08:56

## 2017-01-01 RX ADMIN — ASPIRIN 81 MG: 81 TABLET ORAL at 08:14

## 2017-01-01 RX ADMIN — HEPARIN SODIUM 5000 UNITS: 5000 INJECTION, SOLUTION INTRAVENOUS; SUBCUTANEOUS at 15:54

## 2017-01-01 RX ADMIN — PHENYLEPHRINE HYDROCHLORIDE 100 MCG: 10 INJECTION INTRAVENOUS at 16:38

## 2017-01-01 RX ADMIN — LORAZEPAM 2 MG: 2 INJECTION, SOLUTION INTRAMUSCULAR; INTRAVENOUS at 09:51

## 2017-01-01 RX ADMIN — TRAZODONE HYDROCHLORIDE 100 MG: 100 TABLET, FILM COATED ORAL at 21:00

## 2017-01-01 RX ADMIN — SODIUM CHLORIDE 100 ML/HR: 9 INJECTION, SOLUTION INTRAVENOUS at 00:55

## 2017-01-01 RX ADMIN — GLYCOPYRROLATE 0.4 MG: 0.2 INJECTION, SOLUTION INTRAMUSCULAR; INTRAVENOUS at 16:11

## 2017-01-01 RX ADMIN — DIVALPROEX SODIUM 250 MG: 125 CAPSULE, COATED PELLETS ORAL at 08:27

## 2017-01-01 RX ADMIN — SODIUM CHLORIDE 8 MG/HR: 9 INJECTION, SOLUTION INTRAVENOUS at 11:26

## 2017-01-01 RX ADMIN — DIVALPROEX SODIUM 250 MG: 125 CAPSULE, COATED PELLETS ORAL at 08:57

## 2017-01-01 RX ADMIN — METOPROLOL TARTRATE 12.5 MG: 25 TABLET ORAL at 08:00

## 2017-01-01 RX ADMIN — HYDROMORPHONE HYDROCHLORIDE 1 MG: 1 INJECTION, SOLUTION INTRAMUSCULAR; INTRAVENOUS; SUBCUTANEOUS at 13:11

## 2017-01-01 RX ADMIN — LORAZEPAM 2 MG: 2 INJECTION INTRAMUSCULAR; INTRAVENOUS at 22:11

## 2017-01-01 RX ADMIN — LORAZEPAM 2 MG: 2 INJECTION, SOLUTION INTRAMUSCULAR; INTRAVENOUS at 12:47

## 2017-01-01 RX ADMIN — HYDROCODONE BITARTRATE AND ACETAMINOPHEN 1 TABLET: 5; 325 TABLET ORAL at 16:45

## 2017-01-01 RX ADMIN — HYDROMORPHONE HYDROCHLORIDE 1 MG: 1 INJECTION, SOLUTION INTRAMUSCULAR; INTRAVENOUS; SUBCUTANEOUS at 12:40

## 2017-01-01 RX ADMIN — LORAZEPAM 0.25 MG: 0.5 TABLET ORAL at 08:51

## 2017-01-01 RX ADMIN — DIATRIZOATE MEGLUMINE AND DIATRIZOATE SODIUM 30 ML: 600; 100 SOLUTION ORAL; RECTAL at 18:17

## 2017-01-01 RX ADMIN — HYDROMORPHONE HYDROCHLORIDE 1 MG: 1 INJECTION, SOLUTION INTRAMUSCULAR; INTRAVENOUS; SUBCUTANEOUS at 12:48

## 2017-01-01 RX ADMIN — AMIODARONE HYDROCHLORIDE 150 MG: 1.5 INJECTION, SOLUTION INTRAVENOUS at 18:38

## 2017-01-01 RX ADMIN — FERROUS SULFATE TAB 325 MG (65 MG ELEMENTAL FE) 325 MG: 325 (65 FE) TAB at 07:45

## 2017-01-01 RX ADMIN — HYDROMORPHONE HYDROCHLORIDE 1 MG: 1 INJECTION, SOLUTION INTRAMUSCULAR; INTRAVENOUS; SUBCUTANEOUS at 00:50

## 2017-01-01 RX ADMIN — OLANZAPINE 5 MG: 10 INJECTION, POWDER, FOR SOLUTION INTRAMUSCULAR at 02:21

## 2017-01-01 RX ADMIN — SODIUM CHLORIDE, POTASSIUM CHLORIDE, SODIUM LACTATE AND CALCIUM CHLORIDE 9 ML/HR: 600; 310; 30; 20 INJECTION, SOLUTION INTRAVENOUS at 14:55

## 2017-01-01 RX ADMIN — GLYCOPYRROLATE 0.6 MG: 0.2 INJECTION INTRAMUSCULAR; INTRAVENOUS at 16:35

## 2017-01-01 RX ADMIN — SODIUM CHLORIDE 125 ML/HR: 9 INJECTION, SOLUTION INTRAVENOUS at 20:07

## 2017-01-01 RX ADMIN — LORAZEPAM 0.25 MG: 2 INJECTION INTRAMUSCULAR; INTRAVENOUS at 03:37

## 2017-01-01 RX ADMIN — PANTOPRAZOLE SODIUM 40 MG: 40 TABLET, DELAYED RELEASE ORAL at 08:12

## 2017-01-01 RX ADMIN — POTASSIUM CHLORIDE 10 MEQ: 10 INJECTION, SOLUTION INTRAVENOUS at 16:59

## 2017-01-01 RX ADMIN — LORAZEPAM 2 MG: 2 INJECTION INTRAMUSCULAR; INTRAVENOUS at 00:40

## 2017-01-01 RX ADMIN — HYDRALAZINE HYDROCHLORIDE 20 MG: 20 INJECTION INTRAMUSCULAR; INTRAVENOUS at 13:39

## 2017-01-01 RX ADMIN — ENOXAPARIN SODIUM 30 MG: 30 INJECTION SUBCUTANEOUS at 13:05

## 2017-01-01 RX ADMIN — AZITHROMYCIN DIHYDRATE 500 MG: 500 INJECTION, POWDER, LYOPHILIZED, FOR SOLUTION INTRAVENOUS at 16:43

## 2017-01-01 RX ADMIN — ENOXAPARIN SODIUM 30 MG: 30 INJECTION SUBCUTANEOUS at 12:49

## 2017-01-01 RX ADMIN — HYDROMORPHONE HYDROCHLORIDE 1 MG: 1 INJECTION, SOLUTION INTRAMUSCULAR; INTRAVENOUS; SUBCUTANEOUS at 09:20

## 2017-01-01 RX ADMIN — HYDROMORPHONE HYDROCHLORIDE 1 MG: 1 INJECTION, SOLUTION INTRAMUSCULAR; INTRAVENOUS; SUBCUTANEOUS at 00:57

## 2017-01-01 RX ADMIN — GABAPENTIN 300 MG: 300 CAPSULE ORAL at 16:44

## 2017-01-01 RX ADMIN — LORAZEPAM 2 MG: 2 SOLUTION, CONCENTRATE ORAL at 19:48

## 2017-01-01 RX ADMIN — DIVALPROEX SODIUM 250 MG: 125 CAPSULE, COATED PELLETS ORAL at 09:55

## 2017-01-01 RX ADMIN — HYDROMORPHONE HYDROCHLORIDE 1 MG: 1 INJECTION, SOLUTION INTRAMUSCULAR; INTRAVENOUS; SUBCUTANEOUS at 20:19

## 2017-01-01 RX ADMIN — PANTOPRAZOLE SODIUM 80 MG: 40 INJECTION, POWDER, FOR SOLUTION INTRAVENOUS at 16:25

## 2017-01-01 RX ADMIN — METOPROLOL TARTRATE 25 MG: 25 TABLET ORAL at 14:12

## 2017-01-01 RX ADMIN — FERROUS SULFATE TAB 325 MG (65 MG ELEMENTAL FE) 325 MG: 325 (65 FE) TAB at 09:59

## 2017-01-01 RX ADMIN — LORAZEPAM 2 MG: 2 INJECTION INTRAMUSCULAR; INTRAVENOUS at 12:24

## 2017-01-01 RX ADMIN — DIVALPROEX SODIUM 250 MG: 125 CAPSULE, COATED PELLETS ORAL at 21:00

## 2017-01-01 RX ADMIN — GLYCOPYRROLATE 0.4 MG: 0.2 INJECTION, SOLUTION INTRAMUSCULAR; INTRAVENOUS at 16:54

## 2017-01-01 RX ADMIN — TRAZODONE HYDROCHLORIDE 100 MG: 100 TABLET, FILM COATED ORAL at 21:05

## 2017-01-01 RX ADMIN — SODIUM CHLORIDE 125 ML/HR: 9 INJECTION, SOLUTION INTRAVENOUS at 17:13

## 2017-01-01 RX ADMIN — LEVOFLOXACIN 750 MG: 5 INJECTION, SOLUTION INTRAVENOUS at 02:13

## 2017-01-01 RX ADMIN — METOPROLOL SUCCINATE 12.5 MG: 25 TABLET, FILM COATED, EXTENDED RELEASE ORAL at 19:54

## 2017-01-01 RX ADMIN — LORAZEPAM 2 MG: 2 INJECTION, SOLUTION INTRAMUSCULAR; INTRAVENOUS at 04:34

## 2017-01-01 RX ADMIN — FENTANYL CITRATE 50 MCG: 50 INJECTION INTRAMUSCULAR; INTRAVENOUS at 17:53

## 2017-01-01 RX ADMIN — LORAZEPAM 0.25 MG: 2 INJECTION INTRAMUSCULAR; INTRAVENOUS at 11:41

## 2017-01-01 RX ADMIN — GABAPENTIN 300 MG: 300 CAPSULE ORAL at 08:16

## 2017-01-01 RX ADMIN — CEFAZOLIN SODIUM 1 G: 1 INJECTION, SOLUTION INTRAVENOUS at 22:31

## 2017-01-01 RX ADMIN — FERROUS SULFATE TAB 325 MG (65 MG ELEMENTAL FE) 325 MG: 325 (65 FE) TAB at 08:27

## 2017-01-01 RX ADMIN — ENOXAPARIN SODIUM 40 MG: 40 INJECTION SUBCUTANEOUS at 08:19

## 2017-01-01 RX ADMIN — METOPROLOL TARTRATE 25 MG: 25 TABLET ORAL at 09:37

## 2017-01-01 RX ADMIN — HYDROMORPHONE HYDROCHLORIDE 1 MG: 1 INJECTION, SOLUTION INTRAMUSCULAR; INTRAVENOUS; SUBCUTANEOUS at 08:16

## 2017-01-01 RX ADMIN — LORAZEPAM 0.25 MG: 0.5 TABLET ORAL at 10:19

## 2017-01-01 RX ADMIN — CEFAZOLIN SODIUM 1 G: 1 INJECTION, SOLUTION INTRAVENOUS at 05:33

## 2017-01-01 RX ADMIN — IRON SUCROSE 300 MG: 20 INJECTION, SOLUTION INTRAVENOUS at 20:10

## 2017-01-01 RX ADMIN — GABAPENTIN 300 MG: 300 CAPSULE ORAL at 21:05

## 2017-01-01 RX ADMIN — DIVALPROEX SODIUM 250 MG: 125 CAPSULE, COATED PELLETS ORAL at 17:44

## 2017-01-01 RX ADMIN — LORAZEPAM 2 MG: 2 INJECTION INTRAMUSCULAR; INTRAVENOUS at 15:25

## 2017-01-01 RX ADMIN — LORAZEPAM 0.25 MG: 0.5 TABLET ORAL at 08:12

## 2017-01-01 RX ADMIN — LORAZEPAM 0.25 MG: 2 INJECTION, SOLUTION INTRAMUSCULAR; INTRAVENOUS at 04:29

## 2017-01-01 RX ADMIN — LORAZEPAM 2 MG: 2 INJECTION, SOLUTION INTRAMUSCULAR; INTRAVENOUS at 08:57

## 2017-01-01 RX ADMIN — LORAZEPAM 2 MG: 2 INJECTION INTRAMUSCULAR; INTRAVENOUS at 01:05

## 2017-01-01 RX ADMIN — LORAZEPAM 2 MG: 2 SOLUTION, CONCENTRATE ORAL at 16:55

## 2017-01-01 RX ADMIN — METOPROLOL SUCCINATE 12.5 MG: 25 TABLET, FILM COATED, EXTENDED RELEASE ORAL at 08:50

## 2017-01-01 RX ADMIN — DIVALPROEX SODIUM 250 MG: 125 CAPSULE, COATED PELLETS ORAL at 16:44

## 2017-01-01 RX ADMIN — ENOXAPARIN SODIUM 40 MG: 40 INJECTION SUBCUTANEOUS at 18:45

## 2017-01-01 RX ADMIN — DIVALPROEX SODIUM 250 MG: 125 CAPSULE, COATED PELLETS ORAL at 18:54

## 2017-01-01 RX ADMIN — METOPROLOL TARTRATE 25 MG: 25 TABLET ORAL at 18:20

## 2017-01-01 RX ADMIN — GLYCOPYRROLATE 0.4 MG: 0.2 INJECTION, SOLUTION INTRAMUSCULAR; INTRAVENOUS at 18:49

## 2017-01-01 RX ADMIN — LORAZEPAM 2 MG: 2 INJECTION, SOLUTION INTRAMUSCULAR; INTRAVENOUS at 00:38

## 2017-01-01 RX ADMIN — SODIUM CHLORIDE 125 ML/HR: 9 INJECTION, SOLUTION INTRAVENOUS at 02:52

## 2017-01-01 RX ADMIN — SODIUM CHLORIDE 100 ML/HR: 9 INJECTION, SOLUTION INTRAVENOUS at 18:03

## 2017-01-01 RX ADMIN — TAZOBACTAM SODIUM AND PIPERACILLIN SODIUM 4.5 G: 500; 4 INJECTION, SOLUTION INTRAVENOUS at 15:49

## 2017-01-01 RX ADMIN — FENTANYL CITRATE 50 MCG: 50 INJECTION INTRAMUSCULAR; INTRAVENOUS at 17:38

## 2017-07-24 NOTE — ED NOTES
Pt found of out bed. Reports soa w exertion. Coaxed back into bed and given fresh blankets and place back on monitor     Estefany Hubbard RN  07/24/17 5670

## 2017-07-24 NOTE — ED PROVIDER NOTES
EMERGENCY DEPARTMENT ENCOUNTER    CHIEF COMPLAINT  Chief Complaint: Fall  History given by: Patient  History limited by: Dementia  Room Number: 09/09  PMD: Narciso Ma MD      HPI:  Pt is a 77 y.o. male who presents to the ED via EMS from NH after the patient had two unwitnessed falls since yesterday. The patient reportedly fell last night and again this morning although the mechanism of each fall is unknown. He sustained a skin tear to the posterior aspect of his LUE during one of the falls but he denies any other injuries. Patient's history is limited by dementia.      Duration:  Unknown  Onset: Unknown  Timing: Constant  Location: Left posterior upper extremity  Radiation: Unknown  Quality: Unknown  Intensity/Severity: Moderate  Progression: Unknown  Associated Symptoms: Skin tear  Aggravating Factors: Palpation  Alleviating Factors: Rest  Previous Episodes: Previous falls  Treatment before arrival: None    PAST MEDICAL HISTORY  Active Ambulatory Problems     Diagnosis Date Noted   • No Active Ambulatory Problems     Resolved Ambulatory Problems     Diagnosis Date Noted   • No Resolved Ambulatory Problems     Past Medical History:   Diagnosis Date   • Anxiety    • Arthritis    • CAD (coronary artery disease)    • Cancer    • COPD (chronic obstructive pulmonary disease)    • Dementia    • Depression    • Diabetes mellitus    • Hypertension    • Kidney stone    • Myocardial infarction    • Stroke        PAST SURGICAL HISTORY  Past Surgical History:   Procedure Laterality Date   • BACK SURGERY     • COLONOSCOPY     • HIP SURGERY     • PROSTATE SURGERY         FAMILY HISTORY  Family History   Problem Relation Age of Onset   • Family history unknown: Yes       SOCIAL HISTORY  Social History     Social History   • Marital status: Single     Spouse name: N/A   • Number of children: N/A   • Years of education: N/A     Occupational History   • Not on file.     Social History Main Topics   • Smoking status: Former  Smoker   • Smokeless tobacco: Not on file   • Alcohol use No   • Drug use: No   • Sexual activity: Defer     Other Topics Concern   • Not on file     Social History Narrative       ALLERGIES  Iodine    REVIEW OF SYSTEMS  Review of Systems   Unable to perform ROS: Dementia   Skin:        Skin tears       PHYSICAL EXAM  ED Triage Vitals   Temp Heart Rate Resp BP SpO2   07/24/17 1418 07/24/17 1418 07/24/17 1418 07/24/17 1418 07/24/17 1418   97.7 °F (36.5 °C) 92 16 119/80 97 %      Temp src Heart Rate Source Patient Position BP Location FiO2 (%)   07/24/17 1418 -- 07/24/17 1735 07/24/17 1735 --   Tympanic  Lying Right arm        Physical Exam   Constitutional: He is oriented to person, place, and time and well-developed, well-nourished, and in no distress.   HENT:   Head: Normocephalic and atraumatic.   Eyes: EOM are normal.   Neck: Normal range of motion.   Cardiovascular: Normal rate.  An irregularly irregular rhythm present.   Pulmonary/Chest: Effort normal and breath sounds normal. No respiratory distress.   Musculoskeletal:   Mild DROM left elbow. Moves all extremities well.    Neurological: He is alert and oriented to person, place, and time. He has normal sensation and normal strength.   Skin: Skin is warm and dry.   Small skin tear right ulnar wrist w/o bony tenderness. Large stellate skin tear to posterior left upper arm.    Psychiatric: Affect normal.   Nursing note and vitals reviewed.      LAB RESULTS  Lab Results (last 24 hours)     Procedure Component Value Units Date/Time    Protime-INR [[id number]]  (Abnormal) Collected:  07/24/17 1741    Specimen:  Blood Updated:  07/24/17 1818     Protime 14.7 (H) Seconds      INR 1.20 (H)    CBC & Differential [[id number]] Collected:  07/24/17 2031    Specimen:  Blood Updated:  07/24/17 2043    Narrative:       The following orders were created for panel order CBC & Differential.  Procedure                               Abnormality         Status                      ---------                               -----------         ------                     CBC Auto Differential[[id number]]        Abnormal            Final result                 Please view results for these tests on the individual orders.    Basic Metabolic Panel [[id number]]  (Abnormal) Collected:  07/24/17 2031    Specimen:  Blood Updated:  07/24/17 2101     Glucose 124 (H) mg/dL      BUN 14 mg/dL      Creatinine 1.39 (H) mg/dL      Sodium 142 mmol/L      Potassium 4.1 mmol/L      Chloride 107 mmol/L      CO2 21.3 (L) mmol/L      Calcium 9.7 mg/dL      eGFR Non African Amer 49 (L) mL/min/1.73      BUN/Creatinine Ratio 10.1     Anion Gap 13.7 mmol/L     Narrative:       The MDRD GFR formula is only valid for adults with stable renal function between ages 18 and 70.    CBC Auto Differential [[id number]]  (Abnormal) Collected:  07/24/17 2031    Specimen:  Blood Updated:  07/24/17 2043     WBC 5.49 10*3/mm3      RBC 3.83 (L) 10*6/mm3      Hemoglobin 8.8 (L) g/dL      Hematocrit 29.9 (L) %      MCV 78.1 (L) fL      MCH 23.0 (L) pg      MCHC 29.4 (L) g/dL      RDW 17.1 (H) %      RDW-SD 48.7 fl      MPV 9.4 fL      Platelets 180 10*3/mm3      Neutrophil % 73.6 %      Lymphocyte % 15.1 (L) %      Monocyte % 9.5 %      Eosinophil % 0.9 %      Basophil % 0.5 %      Immature Grans % 0.4 %      Neutrophils, Absolute 4.04 10*3/mm3      Lymphocytes, Absolute 0.83 (L) 10*3/mm3      Monocytes, Absolute 0.52 10*3/mm3      Eosinophils, Absolute 0.05 10*3/mm3      Basophils, Absolute 0.03 10*3/mm3      Immature Grans, Absolute 0.02 10*3/mm3           I ordered the above labs and reviewed the results    RADIOLOGY  XR Elbow 2 View Left            21:11  Reviewed XR L Elbow - There are very severe degenerative changes at the elbow, particularly at the articulation of the radial head with the lateral humeral condyle. There appears to be a joint effusion present which raises the concern of an occult radial head fracture and follow-up  imaging in several days may be helpful.. Independently viewed by me. Interpreted by radiologist.    I ordered the above noted radiological studies. Interpreted by radiologist. Reviewed by me in PACS.       PROCEDURES  Procedures      PROGRESS AND CONSULTS  ED Course     20:14  Ordered XR L Elbow for further evaluation.     22:10  Discussed case with  and he agrees with the treatment plan.     22:15  RN stood the patient at bedside although he was unable to ambulate.     22:55  Rechecked the patient and he is resting. Explained that the skin tear was un-repairable and the area was dressed. Discussed the patient's pertinent labs and imaging results, including his labs and XR L elbow both show nothing acute. Discussed plan to discharge the patient back to the NH. Patient agrees with the plan and all questions were addressed.     Latest vital signs   BP- 148/85 HR- 77 Temp- 97.7 °F (36.5 °C) (Tympanic) O2 sat- 96%      MEDICAL DECISION MAKING  Results were reviewed/discussed with the patient and they were also made aware of online access. Pt also made aware that some labs, such as cultures, will not be resulted during ER visit and follow up with PMD is necessary.     MDM  Number of Diagnoses or Management Options     Amount and/or Complexity of Data Reviewed  Tests in the radiology section of CPT®: reviewed and ordered (XR L Elbow - There are very severe degenerative changes at the elbow, particularly at the articulation of the radial head with the lateral humeral condyle. There appears to be a joint effusion present which raises the concern of an occult radial head fracture and follow-up imaging in several days may be helpful.)  Decide to obtain previous medical records or to obtain history from someone other than the patient: yes           DIAGNOSIS  Final diagnoses:   Fall, initial encounter   Skin tear of upper arm without complication, left, initial encounter   Tear of skin of right wrist, initial encounter        DISPOSITION  DISCHARGE    Patient discharged in stable condition.    Reviewed implications of results, diagnosis, meds, responsibility to follow up, warning signs and symptoms of possible worsening, potential complications and reasons to return to ER, including any further falls.     Patient/Family voiced understanding of above instructions.    Discussed plan for discharge, as there is no emergent indication for admission.  Pt/family is agreeable and understands need for follow up and repeat testing.  Pt is aware that discharge does not mean that nothing is wrong but it indicates no emergency is present that requires admission and they must continue care with follow-up as given below or physician of their choice.     FOLLOW-UP  Narciso Ma MD  [address]  [facility] [address] [phone]    Schedule an appointment as soon as possible for a visit  for recheck of symptoms         Medication List      Notice     No changes were made to your prescriptions during this visit.          Latest Documented Vital Signs:  As of 10:54 PM  BP- 148/85 HR- 77 Temp- 97.7 °F (36.5 °C) (Tympanic) O2 sat- 96%    --  Documentation assistance provided by gm Allen for Víctor Bingham PA-C.  Information recorded by the scribayana was done at my direction and has been verified and validated by me.     Jamir Allen  07/24/17 6262       BRAXTON Lozano  07/25/17 0041

## 2017-07-24 NOTE — ED TRIAGE NOTES
The director of nursing from [facility] called from [facility]. Patient is in memory care unit he was checked for a uti that was neg last week and then Sunday morning had fall and then today had a fall. Facility not able to get ahold of family. Just wanted to call and give us a heads up if we needed anything we could call them back.

## 2017-07-25 NOTE — ED NOTES
Spoke with yvonne jara [facility] to schedule transport back to nursing home.      Mima Huffman RN  07/24/17 0178

## 2017-07-25 NOTE — ED PROVIDER NOTES
Attending Note    History:   Pt is a 76 y/o male who resides in a memory care unit with a hx of dementia who arrives to the ED after he has had multiple falls over the past few days at his nursing home. He was seen in the ED yesterday for a fall, and since then he has had two more unwitnessed falls with unclear mechanism. He sustained skin tears to his left posterior elbow, no other obvious injuries.    Exam:   He is confused, oriented only to person and place. He has multiple abrasions and contusions to the extremities of varying ages, no deformities. No head trauma, he moves all extremities equally. He is able to stand with assistance but not ambulate. There are no other abnormal findings noted.    Course:   Skin tears were cleaned and bandaged, XR of the left elbow showed no acute fracture. His issues seem to have worsened over the past few days but he resides in a dementia unit, and there are no acute interventions at this point that will be of benefit for him. He will be discharged back to nursing home.      Attestation:  I supervised care provided by the midlevel provider.    We have discussed this patient's history, physical exam, and treatment plan.   I have reviewed the note and personally saw and examined the patient and agree with the plan of care.  I agree with the midlevel provider's findings and plan.  I reviewed the midlevel providers note.    Documentation assistance provided by gm Magana for Dr. Ma.  Information recorded by the scribayana was done at my direction and has been verified and validated by me.         Doc Magana  07/24/17 9612       Jeremy Ma MD  07/25/17 9721

## 2017-07-25 NOTE — ED PROVIDER NOTES
EMERGENCY DEPARTMENT ENCOUNTER    CHIEF COMPLAINT  Chief Complaint: ***  History given by: ***  History limited by: ***  Room Number: 09/09  PMD: Narciso Ma MD      HPI:  Pt is a 77 y.o. male who presents complaining of ***    Duration:  ***  Onset: ***  Timing: ***  Location: ***  Radiation: ***  Quality: ***  Intensity/Severity: ***  Progression: ***  Associated Symptoms: ***  Aggravating Factors: ***  Alleviating Factors: ***  Previous Episodes: ***  Treatment before arrival: ***    PAST MEDICAL HISTORY  Active Ambulatory Problems     Diagnosis Date Noted   • No Active Ambulatory Problems     Resolved Ambulatory Problems     Diagnosis Date Noted   • No Resolved Ambulatory Problems     Past Medical History:   Diagnosis Date   • Anxiety    • Arthritis    • CAD (coronary artery disease)    • Cancer    • COPD (chronic obstructive pulmonary disease)    • Dementia    • Depression    • Diabetes mellitus    • Hypertension    • Kidney stone    • Myocardial infarction    • Stroke        PAST SURGICAL HISTORY  Past Surgical History:   Procedure Laterality Date   • BACK SURGERY     • COLONOSCOPY     • HIP SURGERY     • PROSTATE SURGERY         FAMILY HISTORY  Family History   Problem Relation Age of Onset   • Family history unknown: Yes       SOCIAL HISTORY  Social History     Social History   • Marital status: Single     Spouse name: N/A   • Number of children: N/A   • Years of education: N/A     Occupational History   • Not on file.     Social History Main Topics   • Smoking status: Former Smoker   • Smokeless tobacco: Not on file   • Alcohol use No   • Drug use: No   • Sexual activity: Defer     Other Topics Concern   • Not on file     Social History Narrative       ALLERGIES  Iodine    REVIEW OF SYSTEMS  Review of Systems    PHYSICAL EXAM  ED Triage Vitals   Temp Heart Rate Resp BP SpO2   07/24/17 1418 07/24/17 1418 07/24/17 1418 07/24/17 1418 07/24/17 1418   97.7 °F (36.5 °C) 92 16 119/80 97 %      Temp src Heart  Rate Source Patient Position BP Location FiO2 (%)   07/24/17 1418 -- 07/24/17 1735 07/24/17 1735 --   Tympanic  Lying Right arm        Physical Exam    LAB RESULTS  Lab Results (last 24 hours)     Procedure Component Value Units Date/Time    Protime-INR [[id number]]  (Abnormal) Collected:  07/24/17 1741    Specimen:  Blood Updated:  07/24/17 1818     Protime 14.7 (H) Seconds      INR 1.20 (H)          I ordered the above labs and reviewed the results    RADIOLOGY  No orders to display        I ordered the above noted radiological studies. Interpreted by radiologist. Discussed with radiologist (***). Reviewed by me in PACS.       PROCEDURES  Procedures      PROGRESS AND CONSULTS  ED Course           MEDICAL DECISION MAKING  Results were reviewed/discussed with the patient and they were also made aware of online access. Pt also made aware that some labs, such as cultures, will not be resulted during ER visit and follow up with PMD is necessary.     MDM         DIAGNOSIS  Final diagnoses:   None       DISPOSITION  ***    Latest Documented Vital Signs:  As of 8:04 PM  BP- 125/85 HR- 77 Temp- 97.7 °F (36.5 °C) (Tympanic) O2 sat- 93%    --  Documentation assistance provided by gm Magana for Dr. Ma.  Information recorded by the gm was done at my direction and has been verified and validated by me.       Doc Magana  07/24/17 2024

## 2017-08-03 NOTE — ED PROVIDER NOTES
Pt presents from his NH with reports of agressive behavior. Review of the pt's records shows that he was sent here for a similar complaint 1 week ago. History is limited by the pt's severe dementia.     Pt is breathing and resting comfortably.   Pt's vitals are b/p: 103/82, pulse 72.    I supervised care provided by the midlevel provider.    We have discussed this patient's history, physical exam, and treatment plan.   I have reviewed the note and personally saw and examined the patient and agree with the plan of care.  Documentation assistance provided by gm Guerrier.  Information recorded by the gm was done at my direction and has been verified and validated by me.         Chance Guerrier  08/03/17 0120       Roel Meng MD  08/03/17 0556

## 2017-08-03 NOTE — ED TRIAGE NOTES
Pt sent to ED from NH.  Staff reports pt becoming increasingly combative.  Refused to let staff change his wet brief.  Pt swinging and kicking staff.  Pt has hx of dementia, HTN. Oriented to self only

## 2017-08-03 NOTE — ED NOTES
[facility]ed [facility], spoke w/ Monserrat the . Who stated that there is no RN duty currently and one would not be in until the am shift. Monserrat also stated that I could give her the update on the pt.      Rachel Christopher RN  08/03/17 0259

## 2017-08-03 NOTE — ED PROVIDER NOTES
" EMERGENCY DEPARTMENT ENCOUNTER    CHIEF COMPLAINT  Chief Complaint: Psychiatric Evaluation  History given by: Nursing home  History limited by: Dementia  Room Number: 07/07  PMD: Narciso Ma MD      HPI:  Pt is a 77 y.o. male who presents from NH for psychiatric evaluation. Staff reports that pt has become more aggressive and is combative with staff. Pt reports that \"I'm gonna kill this place.\" He does not specify why he is feeling this way. Pt has a hx of dementia. He has been refusing to let staff change his wet brief. Pt denies any medical complaints.    Duration: unable to assess  Onset: gradual  Timing: constant  Quality: aggressive  Intensity/Severity: unable to specify  Progression: worsening  Associated Symptoms: pt denies any medical complaint  Aggravating Factors: unable to specify  Alleviating Factors: unable to specify  Previous Episodes: unable to specify  Treatment before arrival: unable to specify    PAST MEDICAL HISTORY  Active Ambulatory Problems     Diagnosis Date Noted   • No Active Ambulatory Problems     Resolved Ambulatory Problems     Diagnosis Date Noted   • No Resolved Ambulatory Problems     Past Medical History:   Diagnosis Date   • Anxiety    • Arthritis    • CAD (coronary artery disease)    • Cancer    • COPD (chronic obstructive pulmonary disease)    • Dementia    • Depression    • Diabetes mellitus    • Hypertension    • Kidney stone    • Myocardial infarction    • Stroke        PAST SURGICAL HISTORY  Past Surgical History:   Procedure Laterality Date   • BACK SURGERY     • COLONOSCOPY     • HIP SURGERY     • PROSTATE SURGERY         FAMILY HISTORY  Family History   Problem Relation Age of Onset   • Family history unknown: Yes       SOCIAL HISTORY  Social History     Social History   • Marital status: Single     Spouse name: N/A   • Number of children: N/A   • Years of education: N/A     Occupational History   • Not on file.     Social History Main Topics   • Smoking status: " Former Smoker   • Smokeless tobacco: Not on file   • Alcohol use No   • Drug use: No   • Sexual activity: Defer     Other Topics Concern   • Not on file     Social History Narrative       ALLERGIES  Bee venom; Iodine; and Propoxyphene    REVIEW OF SYSTEMS  Review of Systems   Unable to perform ROS: Dementia   Psychiatric/Behavioral: Positive for agitation and behavioral problems.       PHYSICAL EXAM  ED Triage Vitals   Temp Heart Rate Resp BP SpO2   08/02/17 2314 08/02/17 2312 08/02/17 2312 08/02/17 2312 08/02/17 2312   97.2 °F (36.2 °C) 84 18 142/90 100 %      Temp src Heart Rate Source Patient Position BP Location FiO2 (%)   08/02/17 2314 08/02/17 2312 08/02/17 2312 08/02/17 2312 --   Tympanic Monitor Lying Right arm        Physical Exam   Constitutional: He is well-developed, well-nourished, and in no distress.   Wearing wet brief   HENT:   Head: Normocephalic and atraumatic.   Eyes: EOM are normal. Pupils are equal, round, and reactive to light.   Neck: Normal range of motion. Neck supple.   Cardiovascular: Normal rate, regular rhythm and normal heart sounds.    Pulmonary/Chest: Effort normal and breath sounds normal. No respiratory distress.   Abdominal: Soft. There is no tenderness. There is no rebound and no guarding.   Musculoskeletal: Normal range of motion. He exhibits no edema.   Neurological: He has normal sensation and normal strength. He is disoriented (place and time).   Skin: Skin is warm and dry.   Psychiatric: Affect normal. He is agitated.   Uncooperative   Nursing note and vitals reviewed.      LAB RESULTS  Lab Results (last 24 hours)     Procedure Component Value Units Date/Time    CBC & Differential [[id number]] Collected:  08/03/17 0019    Specimen:  Blood Updated:  08/03/17 0040    Narrative:       The following orders were created for panel order CBC & Differential.  Procedure                               Abnormality         Status                     ---------                                -----------         ------                     CBC Auto Differential[[id number]]        Abnormal            Final result                 Please view results for these tests on the individual orders.    Comprehensive Metabolic Panel [[id number]]  (Abnormal) Collected:  08/03/17 0019    Specimen:  Blood Updated:  08/03/17 0048     Glucose 123 (H) mg/dL      BUN 22 mg/dL      Creatinine 1.57 (H) mg/dL      Sodium 144 mmol/L      Potassium 4.3 mmol/L      Chloride 105 mmol/L      CO2 28.8 mmol/L      Calcium 10.8 (H) mg/dL      Total Protein 7.6 g/dL      Albumin 3.60 g/dL      ALT (SGPT) 14 U/L      AST (SGOT) 15 U/L      Alkaline Phosphatase 94 U/L      Total Bilirubin 0.3 mg/dL      eGFR Non African Amer 43 (L) mL/min/1.73      Globulin 4.0 gm/dL      A/G Ratio 0.9 g/dL      BUN/Creatinine Ratio 14.0     Anion Gap 10.2 mmol/L     Narrative:       The MDRD GFR formula is only valid for adults with stable renal function between ages 18 and 70.    Protime-INR [[id number]]  (Normal) Collected:  08/03/17 0019    Specimen:  Blood Updated:  08/03/17 0058     Protime 12.9 Seconds      INR 1.01    Ethanol [[id number]] Collected:  08/03/17 0019    Specimen:  Blood Updated:  08/03/17 0048     Ethanol <10 mg/dL      Ethanol % <0.010 %     CBC Auto Differential [[id number]]  (Abnormal) Collected:  08/03/17 0019    Specimen:  Blood Updated:  08/03/17 0040     WBC 5.64 10*3/mm3      RBC 4.38 (L) 10*6/mm3      Hemoglobin 9.7 (L) g/dL      Hematocrit 34.5 (L) %      MCV 78.8 (L) fL      MCH 22.1 (L) pg      MCHC 28.1 (L) g/dL      RDW 16.7 (H) %      RDW-SD 47.9 fl      MPV 9.5 fL      Platelets 208 10*3/mm3      Neutrophil % 68.7 %      Lymphocyte % 22.0 %      Monocyte % 7.3 %      Eosinophil % 0.7 %      Basophil % 0.9 %      Immature Grans % 0.4 %      Neutrophils, Absolute 3.88 10*3/mm3      Lymphocytes, Absolute 1.24 10*3/mm3      Monocytes, Absolute 0.41 10*3/mm3      Eosinophils, Absolute 0.04 10*3/mm3      Basophils, Absolute  0.05 10*3/mm3      Immature Grans, Absolute 0.02 10*3/mm3     Urinalysis With / Culture If Indicated [[id number]]  (Abnormal) Collected:  08/03/17 0048    Specimen:  Urine from Urine, Catheter Updated:  08/03/17 0101     Color, UA Yellow     Appearance, UA Cloudy (A)     pH, UA 8.0     Specific Gravity, UA 1.011     Glucose, UA Negative     Ketones, UA Negative     Bilirubin, UA Negative     Blood, UA Negative     Protein, UA Negative     Leuk Esterase, UA Trace (A)     Nitrite, UA Negative     Urobilinogen, UA 1.0 E.U./dL    Urine Drug Screen [[id number]]  (Normal) Collected:  08/03/17 0048    Specimen:  Urine from Urine, Catheter Updated:  08/03/17 0120     Amphet/Methamphet, Screen Negative     Barbiturates Screen, Urine Negative     Benzodiazepine Screen, Urine Negative     Cocaine Screen, Urine Negative     Opiate Screen Negative     THC, Screen, Urine Negative     Methadone Screen, Urine Negative     Oxycodone Screen, Urine Negative    Narrative:       Negative Thresholds For Drugs Screened:     Amphetamines               500 ng/ml   Barbiturates               200 ng/ml   Benzodiazepines            100 ng/ml   Cocaine                    300 ng/ml   Methadone                  300 ng/ml   Opiates                    300 ng/ml   Oxycodone                  100 ng/ml   THC                        50 ng/ml    The Normal Value for all drugs tested is negative. This report includes final unconfirmed screening results to be used for medical treatment purposes only. Unconfirmed results must not be used for non-medical purposes such as employment or legal testing. Clinical consideration should be applied to any drug of abuse test, particulary when unconfirmed results are used.    Urinalysis, Microscopic Only [[id number]]  (Abnormal) Collected:  08/03/17 0048    Specimen:  Urine from Urine, Catheter Updated:  08/03/17 0104     RBC, UA 0-2 /HPF      WBC, UA 13-20 (A) /HPF      Bacteria, UA None Seen /HPF      Squamous  Epithelial Cells, UA 0-2 /HPF      Hyaline Casts, UA 0-2 /LPF      Methodology Automated Microscopy    Urine Culture [[id number]] Collected:  08/03/17 0048    Specimen:  Urine from Urine, Catheter Updated:  08/03/17 0104          I ordered the above labs and reviewed the results    RADIOLOGY  XR Chest 2 View   Preliminary Result   Mild congestive heart failure, please clinically correlate.                   I ordered the above noted radiological studies. Interpreted by radiologist. Reviewed by me in PACS.       PROCEDURES  Procedures      PROGRESS AND CONSULTS  ED Course   11:31 PM  Ordered blood work, UA, and CXR for further evaluation.   1:11 AM  Discussed pt with Dr. Meng. Dr. Meng has seen and evaluated pt and agrees with tx plan.   1:39 AM  Pt has been seen and evaluated by [facility]. He will be discharged back to NH.    MEDICAL DECISION MAKING    MDM  Number of Diagnoses or Management Options     Amount and/or Complexity of Data Reviewed  Obtain history from someone other than the patient: yes (Nursing home)           DIAGNOSIS  Final diagnoses:   Dementia with behavioral disturbance, unspecified dementia type       DISPOSITION  DISCHARGE    Patient discharged in stable condition.        FOLLOW-UP  Narciso Ma MD  [address]  [facility] [address] [phone]    Schedule an appointment as soon as possible for a visit in 3 days           Medication List      Notice     No changes were made to your prescriptions during this visit.            Latest Documented Vital Signs:  As of 2:03 AM  BP- 103/82 HR- 84 Temp- 97.2 °F (36.2 °C) (Tympanic) O2 sat- 100%    --  Documentation assistance provided by gm Louis for Kayden Lira PA-C.  Information recorded by the scrsusane was done at my direction and has been verified and validated by me.         Josesito Louis  08/03/17 0140       BRAXTON Gould  08/03/17 0204

## 2017-08-03 NOTE — DISCHARGE INSTRUCTIONS
Return to nursing home, resume all previous nursing home orders, follow up with your PCP in 3 to 5 days for recheck.  Notify PCP of condition in the morning.

## 2017-08-03 NOTE — CONSULTS
76 yo white male evaluated in ED (Room#7) for combativeness BIB EMS from [facility] in [facility]. Patient previously seen by [facility] under similar circumstances on 7/23/2017. History of dementia. Patient alert and oriented times one only. Patient does not know why he is at the hospital. Patient cooperative with disimpaction of fecal loading by RN in ED. Patient is pleasantly confused but poor historian due to dementia. Patient denies SI. Denies HI. No overt psychosis. Per ED staff, patient has been cooperative and not shown any aggression in ED. Reviewed chart. Patient has medication for anxiety and sleep. Patient appears to be near or at baseline currently. Spoke with Dr. Meng re plan of care. Once medically cleared, patient will be sent back to [facility] and continue current medications.

## 2017-08-04 NOTE — ED NOTES
Television turned on, blanket given, lights turned down., will continue to monitor     Lesli Linares RN  08/04/17 0590

## 2017-08-04 NOTE — ED TRIAGE NOTES
Patient sent from [facility].  Pt has been sent for evaluation of self neglect.  Per EMS they had to give the patient a bath before they could transport him to the ER.  Patient has also reportedly been combative with [facility] staff and they are refusing to allow the return of the patient to their facility at this time.  Per EMS family will need to be called for disposition.

## 2017-08-04 NOTE — ED NOTES
Pt was sent from assistive living for evaluation. States that he has not been taking care of himself. Ems states that they had to clean him up before transferring him here. Denies pain. Pt oriented x1. Denies symptoms presently     Lesli Linares RN  08/04/17 4203

## 2017-08-04 NOTE — ED PROVIDER NOTES
EMERGENCY DEPARTMENT ENCOUNTER    CHIEF COMPLAINT  Chief Complaint: Agitation  History given by: EMS and NH  History limited by: Dementia  Room Number: 10/10  PMD: Narciso Ma MD      HPI:  Pt is a 77 y.o. male who presents via EMS from [facility] after the NH states the pt was agitated. The nursing home reports the patient is not eating, performing his normal hygiene, and is combative with staff there.     Duration:  PTA  Onset: gradual  Timing: constant  Quality: agitation  Intensity/Severity: moderate  Progression: unchanged  Associated Symptoms: unable to determine due to pt's dementia     Aggravating Factors: unable to determine due to pt's dementia     Alleviating Factors: unable to determine due to pt's dementia     Previous Episodes: unable to determine due to pt's dementia   Treatment before arrival: none    PAST MEDICAL HISTORY  Active Ambulatory Problems     Diagnosis Date Noted   • No Active Ambulatory Problems     Resolved Ambulatory Problems     Diagnosis Date Noted   • No Resolved Ambulatory Problems     Past Medical History:   Diagnosis Date   • Anxiety    • Arthritis    • CAD (coronary artery disease)    • Cancer    • COPD (chronic obstructive pulmonary disease)    • Dementia    • Depression    • Diabetes mellitus    • Hypertension    • Kidney stone    • Myocardial infarction    • Stroke        PAST SURGICAL HISTORY  Past Surgical History:   Procedure Laterality Date   • BACK SURGERY     • COLONOSCOPY     • HIP SURGERY     • PROSTATE SURGERY         FAMILY HISTORY  Family History   Problem Relation Age of Onset   • Family history unknown: Yes       SOCIAL HISTORY  Social History     Social History   • Marital status: Single     Spouse name: N/A   • Number of children: N/A   • Years of education: N/A     Occupational History   • Not on file.     Social History Main Topics   • Smoking status: Former Smoker   • Smokeless tobacco: Not on file   • Alcohol use No   • Drug use: No   •  Sexual activity: Defer     Other Topics Concern   • Not on file     Social History Narrative       ALLERGIES  Bee venom; Iodine; and Propoxyphene    REVIEW OF SYSTEMS  Review of Systems   Unable to perform ROS: Dementia       PHYSICAL EXAM  ED Triage Vitals   Temp Heart Rate Resp BP SpO2   08/04/17 1809 08/04/17 1809 08/04/17 1809 08/04/17 1809 08/04/17 1809   97.6 °F (36.4 °C) 78 19 132/70 98 %      Temp src Heart Rate Source Patient Position BP Location FiO2 (%)   08/04/17 1809 08/04/17 1809 08/04/17 1809 08/04/17 1809 --   Tympanic Monitor Sitting Right arm        Physical Exam   Constitutional: He is well-developed, well-nourished, and in no distress.   HENT:   Head: Normocephalic and atraumatic.   Eyes: EOM are normal. Pupils are equal, round, and reactive to light.   Neck: Normal range of motion. Neck supple.   Cardiovascular: Normal rate, regular rhythm and normal heart sounds.    Pulmonary/Chest: Effort normal and breath sounds normal. No respiratory distress.   Abdominal: Soft. There is no tenderness. There is no rebound and no guarding.   Musculoskeletal: Normal range of motion. He exhibits no edema.   Neurological: He is alert. He has normal sensation and normal strength.   disoriented to time, place, and person   Skin: Skin is warm and dry.   Psychiatric: Mood and affect normal.   Nursing note and vitals reviewed.      LAB RESULTS  Lab Results (last 24 hours)     Procedure Component Value Units Date/Time    CBC & Differential [[id number]] Collected:  08/04/17 1830    Specimen:  Blood Updated:  08/04/17 1859    Narrative:       The following orders were created for panel order CBC & Differential.  Procedure                               Abnormality         Status                     ---------                               -----------         ------                     CBC Auto Differential[[id number]]        Abnormal            Final result                 Please view results for these tests on the  individual orders.    Comprehensive Metabolic Panel [[id number]]  (Abnormal) Collected:  08/04/17 1830    Specimen:  Blood Updated:  08/04/17 1901     Glucose 119 (H) mg/dL      BUN 21 mg/dL      Creatinine 1.49 (H) mg/dL      Sodium 141 mmol/L      Potassium 4.3 mmol/L      Chloride 104 mmol/L      CO2 27.3 mmol/L      Calcium 10.5 mg/dL      Total Protein 7.1 g/dL      Albumin 3.40 (L) g/dL      ALT (SGPT) 13 U/L      AST (SGOT) 14 U/L      Alkaline Phosphatase 101 U/L      Total Bilirubin 0.4 mg/dL      eGFR Non African Amer 45 (L) mL/min/1.73      Globulin 3.7 gm/dL      A/G Ratio 0.9 g/dL      BUN/Creatinine Ratio 14.1     Anion Gap 9.7 mmol/L     Narrative:       The MDRD GFR formula is only valid for adults with stable renal function between ages 18 and 70.    Protime-INR [[id number]]  (Normal) Collected:  08/04/17 1830    Specimen:  Blood Updated:  08/04/17 1902     Protime 13.6 Seconds      INR 1.08    aPTT [[id number]]  (Normal) Collected:  08/04/17 1830    Specimen:  Blood Updated:  08/04/17 1902     PTT 32.8 seconds     CBC Auto Differential [[id number]]  (Abnormal) Collected:  08/04/17 1830    Specimen:  Blood Updated:  08/04/17 1859     WBC 5.34 10*3/mm3      RBC 4.18 (L) 10*6/mm3      Hemoglobin 9.3 (L) g/dL      Hematocrit 32.5 (L) %      MCV 77.8 (L) fL      MCH 22.2 (L) pg      MCHC 28.6 (L) g/dL      RDW 16.8 (H) %      RDW-SD 48.0 fl      MPV 9.6 fL      Platelets 205 10*3/mm3      Neutrophil % 62.9 %      Lymphocyte % 25.3 %      Monocyte % 9.6 %      Eosinophil % 1.1 %      Basophil % 1.1 %      Immature Grans % 0.0 %      Neutrophils, Absolute 3.36 10*3/mm3      Lymphocytes, Absolute 1.35 10*3/mm3      Monocytes, Absolute 0.51 10*3/mm3      Eosinophils, Absolute 0.06 10*3/mm3      Basophils, Absolute 0.06 10*3/mm3      Immature Grans, Absolute 0.00 10*3/mm3           I ordered the above labs and reviewed the results      PROCEDURES  Procedures      PROGRESS AND CONSULTS  ED Course     18:19  Labs  ordered for further evaluation.    18:44  Ordered IVF for dehydration.    20:15  Consult placed to [facility].    20:25  Consult placed to [facility].    10:07 PM  [facility] has evaluated the pt and discussed the pt's case with his power of , and the previous nursing home. The previous nursing home has agreed to accept the pt back.       MEDICAL DECISION MAKING  Results were reviewed/discussed with the patient and they were also made aware of online access. Pt also made aware that some labs, such as cultures, will not be resulted during ER visit and follow up with PMD is necessary.     MDM  Number of Diagnoses or Management Options  Dementia with behavioral disturbance, unspecified dementia type:      Amount and/or Complexity of Data Reviewed  Clinical lab tests: ordered and reviewed (Unremarkable)  Review and summarize past medical records: yes (Pt was seen 8/2/17, his workup was negative and he was charged back to the nursing home. The nursing home reported pt is eating and not taking care of himself properly.  )    Patient Progress  Patient progress: stable         DIAGNOSIS  Final diagnoses:   Dementia with behavioral disturbance, unspecified dementia type       DISPOSITION  DISCHARGE    Patient discharged in stable condition.    Reviewed implications of results, diagnosis, meds, responsibility to follow up, warning signs and symptoms of possible worsening, potential complications and reasons to return to ER.     Patient/Family voiced understanding of above instructions.    Discussed plan for discharge, as there is no emergent indication for admission.  Pt/family is agreeable and understands need for follow up and repeat testing.  Pt is aware that discharge does not mean that nothing is wrong but it indicates no emergency is present that requires admission and they must continue care with follow-up as given below or physician of their choice.     FOLLOW-UP  MD Lobo [address]  [facility] [address]  [phone]    Schedule an appointment as soon as possible for a visit           Medication List      Notice     No changes were made to your prescriptions during this visit.            Latest Documented Vital Signs:  As of 10:46 PM  BP- 160/97 HR- 53 Temp- 97.6 °F (36.4 °C) (Tympanic) O2 sat- 98%    --  Documentation assistance provided by gm Squires for Dr. Aneesh Middleton MD.  Information recorded by the scribe was done at my direction and has been verified and validated by me.     Neftali Middleton  08/04/17 0631       Aneesh Middleton MD  08/04/17 2272

## 2017-08-05 NOTE — ED NOTES
Assisted Living staff called EMS to transport pt here because pt is uncooperative, not taking care of himself, not taking his medications, loss of bladder & bowel control.  Assisted living report they cannot take care of him anymore.      Valerie Sun RN  08/05/17 1914

## 2017-08-05 NOTE — ED NOTES
Pt resting in bed watching tv. No distress noted at this time. Will continue to monitor     Lesli Linares RN  08/04/17 2117       Lesli Linares RN  08/04/17 2117

## 2017-08-05 NOTE — PROGRESS NOTES
"Pt has returned to the ER from [facility]. Spoke with , Afshan Dorado ([phone]), who states that \"pt cannot return\". , Kathy Franke, spoke with POA and informed that pt cannot return to [facility]. This nurse contacted RANDALL Bonner, who stated that she was on the way in to the hospital. Informed Dr. Tavera.         "

## 2017-08-05 NOTE — PROGRESS NOTES
Spoke w/ Afshan [phone], w/ Jamesizabela, who verified that patient is currently in a 30-day respite bed in a memory care unit, since 7/10/17. She stated that patient can not return to facility b/c of self-neglect, refusing to allow caregivers to provide care, aggression when care is being provided, combativeness and increased aggression. States that facility has informed daughter that he needs a permanent place to live. KATHLEEN Loja, CCP manager, Dr. Middleton and Laura KNIGHT informed

## 2017-08-05 NOTE — CONSULTS
"I called [facility], [phone], and spoke with JURGEN Tobar, \"we were told to tell you, whenever you called that we're not taking him back, we're supposed to refuse him\", Ekaterina then reports, \"our only RN that we have here ... Has already gone home for the day\". Ekaterina reports pt is in a memory care unit. Ekaterina reports pt is in an assisted living home, and they are not supposed to give bedside baths, pt's are expected to be able to perform hygene. Pt has been incontinent of feces and urine. Ekaterina provides phone number of Afshan Dorado, [phone], RN. Asked what has changed in past 2 days, Ekaterina reports pt's behavior is consistent with presentation when sent here 2 days ago. Ekaterina reports pt verbally threatened staff today, unsure what he actually said. Ekaterina reports pt would grab staff and and squeeze with his  and cussed at staff, but denies pt having bitten, kicked, scratched or punched staff. I also spoke with JURGEN Negrete, who reports pt said that he was going to \"beat us up, he said he was going to blow the place up\". I asked JURGEN Negrete, what has changed in past 2 days, \"not really, he's been sent out quite a bit\". Asked if [facility] has looked into transferring pt to a [facility] level of care, Ekaterina reports [facility] has not done this, being unable to reach pt's POA.     I then called demographics listed number for Diana Kerr, [phone] (mobile), pt's POA (per document) if wife unable to perform this role, received VM indicating correct number, with no answer. I also called listed home number with no answer.     I spoke with [facility] , who reports she spoke with Afshan (above), RN who reported pt sent here tonight from Memory Care unit for \"increased aggression\" but did not provide examples; pt in 30 day emergency respite bed, a period ending, 8/10/17; staff has been having difficultly reaching pt's POA, Diana Kerr. I called above number and spoke with Afshan, who reports " "facility has not contacted APS about pt's self neglect, and that Diana has been difficult to contact (pt's wife  pt) and that dtr expressed frustration to Afshan the one time she was able to speak with Diana. RN reports APS has not been contacted by facility. RN reports pt in Assisted Living level of care for Memory Care Unit. Asked what has been done to seek an appropriate level of care for pt, Afshan reports staff have sent pt to out emergency department 3 times now (consistent with documentation). Diana has reportedly voiced frustration about staff trying to call her. Pt's grand-daughter has also been called (listed on POA paperwork) grand-daughter said she doesn't want to have anything to do with pt.     Afshan reports in past 2 days, pt has been \"kicking, scratching, throwing his walker\". RN reports pt has done this in the past, but that, \"it's increased, it's escalated\". RN reports that  of facility told her to refuse to accept pt back.     On approach, pt calm, cooperative. Able to state full name only, reports location of \"[place]\", asked what kind of place this is, reports \"it's like a pencil, mateo\" (disoriented to specific location, situation). Asked year pt repeats his own birthday twice. Asked if he can name the president, \"hell no\". Reports pt have been treating him \"pretty good\", doesn't know why he's here today, denies current thoughts of harming self/others.     I discussed pt's case with Dr. Gant, who ordered pt to be discharged back to facility, and to advise facility they we would report them to the state if they refused to accept pt. Plan to d/c back to facility, Dr. Middleton agrees with plan.     I spoke with Afshan pt's RN and communicated plan. Afshan communicated to me that facility would be accepting pt back there tonight. I also adivsed Afshan, pt's RN, that [facility] should contact APS and seek a state appointed emergency guardian if pt's listed POA's are not " meeting the obligations intrinsic to their legal roles. I discussed case with Laurence,  also.

## 2017-08-05 NOTE — ED NOTES
Pt resting quietly.  No acute distress noted.  VSS.  Awaiting transport back to nursing facility.     Gisele Hernandez RN  08/04/17 9803

## 2017-08-05 NOTE — ED PROVIDER NOTES
EMERGENCY DEPARTMENT ENCOUNTER    CHIEF COMPLAINT  Chief Complaint: Pt being uncooperative  History given by: Patient and EMS  History limited by: Dementia  Room Number: 17/17  PMD: Narciso Ma MD      HPI:  Pt is a 77 y.o. male who presents complaining of pt being uncooperative onset the past several days. The pt states he does not have any complaints.    The pt's assisted living center states the pt is being uncooperative, not taking care of himself, not taking his medications, and has been urinating/defecating on himself. The assisted living center states they can't take care of the pt anymore. The pt was seen in the ED yesterday for the same complaint.    Duration: The past several days  Onset: Gradual  Timing: Constant  Radiation: None  Quality: Uncooperative  Intensity/Severity: Moderate  Progression: Unchanged  Associated Symptoms: Unable to obtain due to pt's dementia  Aggravating Factors: Unable to obtain due to pt's dementia  Alleviating Factors: Unable to obtain due to pt's dementia  Previous Episodes: The pt was seen in the ED yesterday for the same complaint.  Treatment before arrival: Nothing    PAST MEDICAL HISTORY  Active Ambulatory Problems     Diagnosis Date Noted   • No Active Ambulatory Problems     Resolved Ambulatory Problems     Diagnosis Date Noted   • No Resolved Ambulatory Problems     Past Medical History:   Diagnosis Date   • Anxiety    • Arthritis    • CAD (coronary artery disease)    • Cancer    • COPD (chronic obstructive pulmonary disease)    • Dementia    • Depression    • Diabetes mellitus    • Hypertension    • Kidney stone    • Myocardial infarction    • Stroke        PAST SURGICAL HISTORY  Past Surgical History:   Procedure Laterality Date   • BACK SURGERY     • COLONOSCOPY     • HIP SURGERY     • PROSTATE SURGERY         FAMILY HISTORY  Family History   Problem Relation Age of Onset   • Family history unknown: Yes       SOCIAL HISTORY  Social History     Social History    • Marital status: Single     Spouse name: N/A   • Number of children: N/A   • Years of education: N/A     Occupational History   • Not on file.     Social History Main Topics   • Smoking status: Former Smoker   • Smokeless tobacco: Not on file   • Alcohol use No   • Drug use: No   • Sexual activity: Defer     Other Topics Concern   • Not on file     Social History Narrative       ALLERGIES  Bee venom; Iodine; and Propoxyphene    REVIEW OF SYSTEMS  Review of Systems   Unable to perform ROS: Dementia       PHYSICAL EXAM  ED Triage Vitals   Temp Heart Rate Resp BP SpO2   08/05/17 1915 08/05/17 1914 08/05/17 1914 08/05/17 1914 08/05/17 1914   97 °F (36.1 °C) 82 16 130/68 98 %      Temp src Heart Rate Source Patient Position BP Location FiO2 (%)   -- -- -- -- --              Physical Exam   Constitutional: He is well-developed, well-nourished, and in no distress.   HENT:   Head: Normocephalic and atraumatic.   Mouth/Throat: Mucous membranes are dry.   Eyes: EOM are normal. Pupils are equal, round, and reactive to light.   Neck: Normal range of motion. Neck supple.   Cardiovascular: Normal rate and normal heart sounds.  An irregularly irregular rhythm present.   Pulmonary/Chest: Effort normal and breath sounds normal. No respiratory distress.   Abdominal: Soft. Bowel sounds are normal. There is no tenderness. There is no rebound and no guarding.   Musculoskeletal: Normal range of motion. He exhibits no edema.   Neurological: He is alert. He has normal sensation and normal strength.   Skin: Skin is warm and dry.   The pt has dry skin to his BLE.   Nursing note and vitals reviewed.      LAB RESULTS  Lab Results (last 24 hours)     ** No results found for the last 24 hours. **          I ordered the above labs and reviewed the results    RADIOLOGY  No orders to display        I ordered the above noted radiological studies. Interpreted by radiologist. Reviewed by me in PACS.       PROCEDURES  Procedures      PROGRESS AND  CONSULTS  ED Course     1933  This is the pt's third visit in three day and the pt has been medically cleared. [facility] has been called and they will evaluate the pt. The CCP nurse has talked with the pt's POA and they are on the way.  Consult placed to [facility].    2014  [facility] has arrived to the ED and is going to evaluate the pt.    2041  Dr. Gant will not admit the pt. [facility] has a bed and the pt's information will be sent to [facility] for review.    2300  The pt's care was discussed with Dr. Middleton and turned over pending the decision by [facility].    MEDICAL DECISION MAKING  Results were reviewed/discussed with the patient and they were also made aware of online access. Pt also made aware that some labs, such as cultures, will not be resulted during ER visit and follow up with PMD is necessary.     MDM  Number of Diagnoses or Management Options     Amount and/or Complexity of Data Reviewed  Review and summarize past medical records: yes (The pt has had blood work done on August 3 and 4 that was negative.)    Patient Progress  Patient progress: stable         DIAGNOSIS  Final diagnoses:   Dementia with behavioral disturbance, unspecified dementia type       DISPOSITION  The pt's care was discussed with Dr. Middleton and turned over pending the decision by [facility].    Latest Documented Vital Signs:  As of 11:14 PM  BP- 131/87 HR- 75 Temp- 97 °F (36.1 °C) O2 sat- 94%    --  Documentation assistance provided by gm Middleton for Dr. Middleton.  Information recorded by the scribayana was done at my direction and has been verified and validated by me.     Neftali Middleton  08/05/17 8516       Ector Tavera MD  08/05/17 9579

## 2017-08-06 NOTE — ED NOTES
Spoke to Etta from [facility], advised she would be starting paperwork for patient to be transferred to the [facility].       Alejandra Rebollar RN  08/05/17 0545

## 2017-08-06 NOTE — ED NOTES
Daughter was concerned due to patient becoming irritable. I will let Nivia KNIGHT know. Also put draw sheet under patient and moved him up in bed.      Juliana Vásquez  08/05/17 2043

## 2017-08-06 NOTE — CONSULTS
Patient seen for aggressive behavior in room 24 with POA present. This is his 3rd assessment by our services since 8-3-17. Patient is a resident of [facility] in [place], [place]. He has a dx of anxiety, depression, dementia. Patient has been seen by ED MD and medically cleared x 2 this weekend. Patient is resting quietly watching TV. He responds to his name but is not oriented to place, time and situation and shows significant memory impairment. He is unable to provide hx. Pau, his daughter is at bedside and reports that she is worried about patient because he will not take his medication, he is not eating and he becomes combative with and  will not let the staff help him with his ADL'S. She has asked that patient be admitted to [facility] for further evaluation.   I have contacted Caity with Intake Services at [facility]. She has agreed to review patient information at this time.   Info to be faxed to # [phone]. Discussed with Dr. Tavera in ED.     Call received from Caity at [facility]. Dr. Archuleta has accepted patient for admission. ED staff notified. RANDALL/Diana schwartz is  aware. Patient will be transported via [facility] with daughter to follow.

## 2017-08-06 NOTE — PROGRESS NOTES
Discharge Planning Assessment  [facility]     Patient Name: Darrian Davis  MRN: [id number]  Today's Date: 8/5/2017    Admit Date: 8/5/2017          Discharge Needs Assessment     None            Discharge Plan       08/05/17 2042    Case Management/Social Work Plan    Additional Comments Long discussion with RANDALL Bonner re:  her responsibilities as a POA. Diana Kerr stated that she would not be able ot care for him at home and she was unsure where to turn. Diana Kerr requested that pt be sent to the [facility]. Contacted [facility] , who has assessed pt and contacted the [facility]. Admitting staff at the [facility] will be reviewing information.        Discharge Placement     No information found                Demographic Summary     None            Functional Status     None            Psychosocial     None            Abuse/Neglect     None            Legal     None            Substance Abuse     None            Patient Forms     None          Ceci Sousa RN

## 2017-08-06 NOTE — ED NOTES
Spoke to Kristi thomason [facility], states someone should be here within the hour.      Alejandra Rebollar RN  08/05/17 8791

## 2017-08-12 PROBLEM — R65.20 SEVERE SEPSIS (HCC): Status: ACTIVE | Noted: 2017-01-01

## 2017-08-12 PROBLEM — A41.9 SEPSIS (HCC): Status: ACTIVE | Noted: 2017-01-01

## 2017-08-12 PROBLEM — A41.9 SEVERE SEPSIS (HCC): Status: ACTIVE | Noted: 2017-01-01

## 2017-08-12 NOTE — H&P
Patient Care Team:  Narciso Ma MD as PCP - General (Family Medicine)    Chief complaint:  Decreased level of consiusness    History of present illness:  This is a 77-year-old male patient with dementia.  He lives at the nursing home.  He was noted to be unresponsive at the nursing home and his blood pressure was low in the 70.  EMS was called and patient was transferred to the ED.  He received IV fluid route.  Upon arrival to ED, patient was awake and following commands but his blood pressure was low.  He received IV fluid and was started empirically on Zosyn and azithromycin for possible sepsis.    I interviewed and examined the patient in ICU.  He looks dry.  He is pleasantly demented.  He does not have any complaint.  He was not able to follow all commands due to his underlying dementia.      Labs:  Sodium = 146; creatinine = 1.9 (baseline 1.4-1.5); BUN = 36; lactic acid = 3.1; INR = 2.3; hemoglobin = 9.2     Review of Systems:  Cannot obtain.  Patient is demented and confused    History  Past Medical History:   Diagnosis Date   • Anxiety    • Arthritis    • CAD (coronary artery disease)    • Cancer     skin, prostate   • COPD (chronic obstructive pulmonary disease)    • Dementia    • Depression    • Diabetes mellitus    • Hypertension    • Kidney stone    • Myocardial infarction    • Stroke      Past Surgical History:   Procedure Laterality Date   • BACK SURGERY     • COLONOSCOPY     • HIP SURGERY     • PROSTATE SURGERY       Family History   Problem Relation Age of Onset   • Family history unknown: Yes     Social History   Substance Use Topics   • Smoking status: Former Smoker   • Smokeless tobacco: None   • Alcohol use No     Prescriptions Prior to Admission   Medication Sig Dispense Refill Last Dose   • ALPRAZolam (XANAX) 0.25 MG tablet Take 0.25 mg by mouth 2 (Two) Times a Day As Needed for Anxiety.      • aspirin 81 MG tablet Take 81 mg by mouth Daily.      • bumetanide (BUMEX) 2 MG  tablet Take 2 mg by mouth 2 (Two) Times a Day.      • diphenoxylate-atropine (LOMOTIL) 2.5-0.025 MG per tablet Take 1 tablet by mouth Daily As Needed for Diarrhea.      • gabapentin (NEURONTIN) 300 MG capsule Take 300 mg by mouth 4 (Four) Times a Day.      • HYDROcodone-acetaminophen (NORCO) 7.5-325 MG per tablet Take 1 tablet by mouth Every 4 (Four) Hours As Needed for Moderate Pain (4-6).      • metoprolol succinate XL (TOPROL-XL) 100 MG 24 hr tablet Take 100 mg by mouth Daily.      • Multiple Vitamins-Minerals (MULTIVITAMIN ADULT PO) Take  by mouth Daily.      • nitroglycerin (NITROSTAT) 0.4 MG SL tablet Place 0.4 mg under the tongue Every 5 (Five) Minutes As Needed for Chest Pain. Take no more than 3 doses in 15 minutes.      • Omega-3 Fatty Acids (FISH OIL) 1000 MG capsule capsule Take 1,000 mg by mouth Daily With Breakfast.      • Potassium Chloride (KLOR-CON PO) Take 20 mEq by mouth 2 (Two) Times a Day.      • traZODone (DESYREL) 100 MG tablet Take 100 mg by mouth Every Night.      • warfarin (COUMADIN) 1 MG tablet TAKE ONE TABLET BY MOUTH ONCE DAILY FOR 30 DAYS OR AS DIRECTED WEEKLY TO KEEP INR 2-3 30 tablet 0    • warfarin (COUMADIN) 5 MG tablet Take 2.5 mg by mouth Daily.        Allergies:  Bee venom; Iodine; and Propoxyphene    Vital Signs  Temp:  [95.2 °F (35.1 °C)-97.6 °F (36.4 °C)] 97.6 °F (36.4 °C)  Heart Rate:  [58-79] 79  Resp:  [14-22] 17  BP: ()/(46-76) 109/76    Physical Exam:  Constitutional: Not in acute distress.  Eyes: Injected conjunctiva, EOMI.  ENMT: Mcnulty 3. Dry mouth  Heart: RRR, no murmur  Lungs: Good and equal air entry bilaterally.  No rales or wheezing      Abdomen: Soft. No tenderness or dullness.  Extremities: No cyanosis, clubbing or pitting edema. Moves all extremities.  Neuro: Conscious, alert, disoriented x3. Does not follow commands  Psych: Pleasant mood. Dementia  Integumentary: No rash. Dry skin  Lymphatic: No palpable cervical or supraclavicular lymph  nodes.      Diagnostic imaging:  I personally and independently reviewed the following images:   Nodule LLL, subpleural. Diverticulosis          Assessment:    1) ADRIEL on CKD  2) Hypotension, 2ary to #5 +/- #3 in addition to antihypertensive and diuretics  3) Possible sepsis  4) Hypernatremia  5) Dehydration  6) Possible Sigmoid diverticulitis vs  diverticulosis  7) Bilateral nonobstructing kidney stones  8) Left adrenal mass, 2.9 cm, stable since 2014  9) Anemia, chronic  10) Positive stool occult blood  11) Relative bradycardia  12) Lactic acidosis, 2ary to dehydration vs sepsis    Other medical problems:  -Recurrent DVT/PE  -Afib  -Anticoagularion with Warfarin  -COPD  -Prior TIA  -HTN    Plan:  - Admit to ICU  - Check Procal  - AB with Levaquin to cover possible diverticulitis   - IV hydration  - Hold Bumex, Metoprolol and Warfarin  - AM labs  - Speech eval at bedside.      I discussed the patients findings and my recommendations with nursing staff. No family at bedside.     Junie Hartmann MD  08/12/17  5:11 PM          EMR Dragon/Transcription disclaimer:   Much of this encounter note is an electronic transcription/translation of spoken language to printed text. The electronic translation of spoken language may permit erroneous, or at times, nonsensical words or phrases to be inadvertently transcribed; Although I have reviewed the note for such errors, some may still exist.

## 2017-08-12 NOTE — ED PROVIDER NOTES
EMERGENCY DEPARTMENT ENCOUNTER    CHIEF COMPLAINT  Chief Complaint: Hypotension  History given by: EMS  History limited by: Dementia  Room Number: 33/33  PMD: Narciso Ma MD      HPI:  Pt is a 77 y.o. male who presents complaining of hypotension onset earlier today. EMS states they were called to the [facility] for unresponsiveness.  When EMS arrived, the pt was found to have inadequate respirations and was hypotensive at 70 systolic. The pt was bagged and CPR was started on the pt by EMS. The pt was given IVFs per EMS.      Duration:  Since earlier today  Onset: Gradual  Timing: Constant  Radiation: None  Quality: Hypotension  Intensity/Severity: Moderate  Progression: Improving  Associated Symptoms: Unable to obtain due to pt's dementia  Aggravating Factors: Unable to obtain due to pt's dementia  Alleviating Factors: Unable to obtain due to pt's dementia  Previous Episodes: Unable to obtain due to pt's dementia  Treatment before arrival: IVFs per EMS    PAST MEDICAL HISTORY  Active Ambulatory Problems     Diagnosis Date Noted   • No Active Ambulatory Problems     Resolved Ambulatory Problems     Diagnosis Date Noted   • No Resolved Ambulatory Problems     Past Medical History:   Diagnosis Date   • Anxiety    • Arthritis    • CAD (coronary artery disease)    • Cancer    • COPD (chronic obstructive pulmonary disease)    • Dementia    • Depression    • Diabetes mellitus    • Hypertension    • Kidney stone    • Myocardial infarction    • Stroke        PAST SURGICAL HISTORY  Past Surgical History:   Procedure Laterality Date   • BACK SURGERY     • COLONOSCOPY     • HIP SURGERY     • PROSTATE SURGERY         FAMILY HISTORY  Family History   Problem Relation Age of Onset   • Family history unknown: Yes       SOCIAL HISTORY  Social History     Social History   • Marital status: Single     Spouse name: N/A   • Number of children: N/A   • Years of education: N/A     Occupational History   • Not on file.     Social History  Main Topics   • Smoking status: Former Smoker   • Smokeless tobacco: Not on file   • Alcohol use No   • Drug use: No   • Sexual activity: Defer     Other Topics Concern   • Not on file     Social History Narrative       ALLERGIES  Bee venom; Iodine; and Propoxyphene    REVIEW OF SYSTEMS  Review of Systems   Unable to perform ROS: Dementia   Cardiovascular:        Hypotension       PHYSICAL EXAM  ED Triage Vitals   Temp Pulse Resp BP SpO2   -- -- -- -- --             Temp src Heart Rate Source Patient Position BP Location FiO2 (%)   -- -- -- -- --              Physical Exam   HENT:   Head: Normocephalic and atraumatic.   Eyes: EOM are normal. Pupils are equal, round, and reactive to light.   Neck: Normal range of motion. Neck supple.   Cardiovascular: Normal rate, regular rhythm and normal heart sounds.    The pt has a BP of 106/82 in the room.   Pulmonary/Chest: Effort normal and breath sounds normal. No respiratory distress.   Abdominal: Soft. There is tenderness (Mild). There is no rebound and no guarding.   Genitourinary: Rectal exam shows guaiac positive stool.   Musculoskeletal: Normal range of motion. He exhibits no edema.   Neurological: He is alert. He has normal sensation and normal strength.   Skin: Skin is warm and dry.   Nursing note and vitals reviewed.      LAB RESULTS  Lab Results (last 24 hours)     Procedure Component Value Units Date/Time    CBC & Differential [[id number]] Collected:  08/12/17 1144    Specimen:  Blood Updated:  08/12/17 1159    Narrative:       The following orders were created for panel order CBC & Differential.  Procedure                               Abnormality         Status                     ---------                               -----------         ------                     CBC Auto Differential[[id number]]        Abnormal            Final result                 Please view results for these tests on the individual orders.    Comprehensive Metabolic Panel [[id number]]   (Abnormal) Collected:  08/12/17 1144    Specimen:  Blood Updated:  08/12/17 1219     Glucose 158 (H) mg/dL      BUN 36 (H) mg/dL      Creatinine 1.92 (H) mg/dL      Sodium 146 (H) mmol/L      Potassium 4.3 mmol/L      Chloride 110 (H) mmol/L      CO2 22.9 mmol/L      Calcium 10.0 mg/dL      Total Protein 6.5 g/dL      Albumin 3.40 (L) g/dL      ALT (SGPT) 18 U/L      AST (SGOT) 16 U/L      Alkaline Phosphatase 101 U/L      Total Bilirubin 0.2 mg/dL      eGFR Non African Amer 34 (L) mL/min/1.73      Globulin 3.1 gm/dL      A/G Ratio 1.1 g/dL      BUN/Creatinine Ratio 18.8     Anion Gap 13.1 mmol/L     Narrative:       The MDRD GFR formula is only valid for adults with stable renal function between ages 18 and 70.    Troponin [[id number]]  (Normal) Collected:  08/12/17 1144    Specimen:  Blood Updated:  08/12/17 1217     Troponin T 0.024 ng/mL     Narrative:       Troponin T Reference Ranges:  Less than 0.03 ng/mL:    Negative for AMI  0.03 to 0.09 ng/mL:      Indeterminant for AMI  Greater than 0.09 ng/mL: Positive for AMI    Protime-INR [[id number]]  (Abnormal) Collected:  08/12/17 1144    Specimen:  Blood Updated:  08/12/17 1209     Protime 24.8 (H) Seconds      INR 2.34 (H)    Lactic Acid, Plasma [[id number]]  (Abnormal) Collected:  08/12/17 1144    Specimen:  Blood Updated:  08/12/17 1223     Lactate 3.1 (C) mmol/L     CBC Auto Differential [[id number]]  (Abnormal) Collected:  08/12/17 1144    Specimen:  Blood Updated:  08/12/17 1159     WBC 7.05 10*3/mm3      RBC 4.22 (L) 10*6/mm3      Hemoglobin 9.2 (L) g/dL      Hematocrit 33.1 (L) %      MCV 78.4 (L) fL      MCH 21.8 (L) pg      MCHC 27.8 (L) g/dL      RDW 16.9 (H) %      RDW-SD 48.5 fl      MPV 9.0 fL      Platelets 181 10*3/mm3      Neutrophil % 70.6 %      Lymphocyte % 20.0 %      Monocyte % 7.5 %      Eosinophil % 0.9 %      Basophil % 0.7 %      Immature Grans % 0.3 %      Neutrophils, Absolute 4.98 10*3/mm3      Lymphocytes, Absolute 1.41 10*3/mm3       Monocytes, Absolute 0.53 10*3/mm3      Eosinophils, Absolute 0.06 10*3/mm3      Basophils, Absolute 0.05 10*3/mm3      Immature Grans, Absolute 0.02 10*3/mm3     Blood Culture [[id number]] Collected:  08/12/17 1337    Specimen:  Blood from Arm, Left Updated:  08/12/17 1450    Blood Culture [[id number]] Collected:  08/12/17 1339    Specimen:  Blood from Arm, Left Updated:  08/12/17 1342    Urinalysis With / Culture If Indicated [[id number]]  (Abnormal) Collected:  08/12/17 1426    Specimen:  Urine from Urine, Catheter Updated:  08/12/17 1440     Color, UA Yellow     Appearance, UA Clear     pH, UA 5.5     Specific Gravity, UA 1.009     Glucose, UA Negative     Ketones, UA Negative     Bilirubin, UA Negative     Blood, UA Negative     Protein, UA Negative     Leuk Esterase, UA Trace (A)     Nitrite, UA Negative     Urobilinogen, UA 0.2 E.U./dL    Urinalysis, Microscopic Only [[id number]]  (Abnormal) Collected:  08/12/17 1426    Specimen:  Urine from Urine, Catheter Updated:  08/12/17 1440     RBC, UA 0-2 /HPF      WBC, UA 3-5 (A) /HPF      Bacteria, UA None Seen /HPF      Squamous Epithelial Cells, UA 0-2 /HPF      Hyaline Casts, UA 3-6 /LPF      Methodology Automated Microscopy          I ordered the above labs and reviewed the results    RADIOLOGY  XR Chest 1 View   Final Result   1. Limited imaging due to body habitus portable technique and low lung  volumes.  2. Developing peripheral opacification right lung inferiorly and  laterally question early pneumonia.  3. CT follow-up should be considered for more accurate assessment.   CT Abdomen Pelvis Without Contrast   Final Result   1. Chronic changes as noted.   2. Can't rule out mild sigmoid diverticulitis.   3. Stable left adrenal mass, gallstones, aortic calcification,   spondylosis.       This report was finalized on 8/12/2017 12:59 PM by Dr. Horacio Ang MD.          CT Head Without Contrast   Final Result   1. No evidence of acute intracranial process.       This  report was finalized on 8/12/2017 12:48 PM by Dr. Horacio Ang MD.               I ordered the above noted radiological studies. Interpreted by radiologist. Reviewed by me in PACS.       PROCEDURES  Critical Care  Performed by: CHARLES HOOVER  Authorized by: ASIF FORD   Total critical care time: 35 minutes  Critical care time was exclusive of separately billable procedures and treating other patients.  Critical care was necessary to treat or prevent imminent or life-threatening deterioration of the following conditions: sepsis and dehydration.  Critical care was time spent personally by me on the following activities: review of old charts, re-evaluation of patient's condition, ordering and review of laboratory studies, examination of patient, evaluation of patient's response to treatment and discussions with consultants.          EKG           EKG time: 1138  Rhythm/Rate: 81 A fib  QRS, axis: RBBB   ST and T waves: Normal    Interpreted Contemporaneously by me, independently viewed  Unchanged compared to prior 7-22-17      PROGRESS AND CONSULTS  ED Course   Comment By Time   2:59 PM  Patient with hypotension and possible sepsis.  Was unresponsive but this has improved.  Given 30cc/kg bolus.  Possible pneumonia on x ray.  Given zosyn and azithromycin.  Discussed with Dr. Ford who will admit. Charles Hoover MD 08/12 1503     1142  CT Abd, CT Head, EKG, and labs ordered for further evaluation. IVFs ordered.    1301  Labs ordered for further evaluation.    1404  CXR ordered for further evaluation.    1437  Consult placed to Pulmonology.    1452  Received a call from Dr. Mccabe and discussed pt's case. Dr. Mccabe agreed with plan to admit the pt.      MEDICAL DECISION MAKING  Results were reviewed/discussed with the patient and they were also made aware of online access. Pt also made aware that some labs, such as cultures, will not be resulted during ER visit and follow up with PMD is necessary.      MDM  Number of Diagnoses or Management Options     Amount and/or Complexity of Data Reviewed  Clinical lab tests: reviewed and ordered (Lactic Acid - 3.1  Troponin - 0.024)  Tests in the radiology section of CPT®: ordered and reviewed (CT Abd - 1. Chronic changes as noted.  2. Can't rule out mild sigmoid diverticulitis.  3. Stable left adrenal mass, gallstones, aortic calcification, spondylosis.  CT Head - 1. No evidence of acute intracranial process.  CXR - 1. Limited imaging due to body habitus portable technique and low lung volumes.  2. Developing peripheral opacification right lung inferiorly and laterally question early pneumonia.  3. CT follow-up should be considered for more accurate assessment.  )  Tests in the medicine section of CPT®: ordered and reviewed (Refer to the procedure section of the note for EKG results  )  Discussion of test results with the performing providers: yes (Dr. Mccabe)  Decide to obtain previous medical records or to obtain history from someone other than the patient: yes  Discuss the patient with other providers: yes    Patient Progress  Patient progress: stable         DIAGNOSIS  Final diagnoses:   Sepsis, due to unspecified organism   Pneumonia of right lung due to infectious organism, unspecified part of lung       DISPOSITION  ADMISSION    Discussed treatment plan and reason for admission with pt/family and admitting physician.  Pt/family voiced understanding of the plan for admission for further testing/treatment as needed.         Latest Documented Vital Signs:  As of 3:05 PM  BP- 103/67 HR- 70 Temp- 95.2 °F (35.1 °C) (Tympanic) O2 sat- 94%    --  Documentation assistance provided by gm Middleton for Dr. Casanova.  Information recorded by the gm was done at my direction and has been verified and validated by me.     Neftali Middleton  08/12/17 4877       Kranthi Casanova MD  08/12/17 4851

## 2017-08-13 NOTE — CONSULTS
"Adult Nutrition  Assessment/PES    Patient Name:  Darrian Davis  YOB: 1940  MRN: [id number]  Admit Date:  8/12/2017    Assessment Date:  8/13/2017        Reason for Assessment       08/13/17 1158    Reason for Assessment    Reason For Assessment/Visit identified at risk by screening criteria    Identified At Risk By Screening Criteria MST SCORE 2+    Diagnosis Diagnosis    Infectious Disease Sepsis    Neurological Dementia              Nutrition/Diet History       08/13/17 1158    Nutrition/Diet History    Typical Food/Fluid Intake NH resident, presently NPO            Anthropometrics       08/13/17 1158    Anthropometrics    RD Documented Current Weight  87.1 kg (192 lb)    Anthropometrics (Special Considerations)    Height Used for Calculations 1.829 m (6' 0.01\")    RD Calculated IBW 2    Body Mass Index (BMI)    BMI Grade 25 - 29.9 - overweight            Labs/Tests/Procedures/Meds       08/13/17 1159    Labs/Tests/Procedures/Meds    Diagnostic Test/Procedure Review reviewed    Labs/Tests Review Reviewed    Procedure Review SLP    Swallow eval status Pending    Medication Review Reviewed, pertinent            Physical Findings       08/13/17 1159    Physical Findings/Assessment    Additional Documentation Physical Appearance (Group)    Physical Appearance    Skin --   intact              Nutrition Prescription Ordered       08/13/17 1159    Nutrition Prescription PO    Current PO Diet NPO            Evaluation of Received Nutrient/Fluid Intake       08/13/17 1159    Evaluation of Received Nutrient/Fluid Intake    Nutrition Delivered Fluid Evaluation    Fluid Intake Evaluation    IV Fluid (mL) 2400    Total Fluid Intake (mL) 2400              Problem/Interventions:        Problem 1       08/13/17 1200    Nutrition Diagnoses Problem 1    Problem 1 Nutrition Appropriate for Condition at this Time    Signs/Symptoms (evidenced by) NPO;SLP/Swallow eval    Swallow eval status Pending                  "   Intervention Goal       08/13/17 1200    Intervention Goal    General Maintain nutrition    PO Advance diet;Tolerate PO    Weight No significant weight loss            Nutrition Intervention       08/13/17 1200    Nutrition Intervention    RD/Tech Action Follow Tx progress;Care plan reviewd;Await begin PO              Education/Evaluation       08/13/17 1200    Monitor/Evaluation    Monitor Per protocol        Comments:  Nursing nutrition screen trigger. NPO at present. Follow as diet advanced.    Electronically signed by:  Shruti Marin RD  08/13/17 12:00 PM

## 2017-08-13 NOTE — PROGRESS NOTES
Had an episode of nonsustained V. tach.  Vital signs were stable.  Unfortunately, he has dementia and cannot express whether he has chest pain or not.  We checked troponin and magnesium from the morning lab which were normal.  EKG was ordered.  I reviewed the EKG.  No ischemic changes.  We will continue monitoring for now.  Check troponin in a.m.

## 2017-08-13 NOTE — NURSING NOTE
Observed 5 beat run vtach followed by 11 beat run vtach and Dr. Hartmann notified.  Received orders for magnesium and troponin STAT from am draw and EKG.

## 2017-08-13 NOTE — PROGRESS NOTES
"                                              LOS: 1 day   Patient Care Team:  Narciso Ma MD as PCP - General (Family Medicine)    Chief Complaint:  ICU follow-up for hypertension, possible sepsis, lactic acidosis and renal failure    Interval History:   Blood pressure improved.  He is on IV fluid with NS.  No bowel movement since yesterday.  Patient does not have any complaints.  He remains confused, probably at his baseline. He has dementia     REVIEW OF SYSTEMS:   Cannot obtain.  Patient has dementia.    Ventilator/Non-Invasive Ventilation Settings     None            Vital Signs  Temp:  [95.2 °F (35.1 °C)-98.1 °F (36.7 °C)] 97.4 °F (36.3 °C)  Heart Rate:  [53-86] 86  Resp:  [14-22] 17  BP: ()/(42-86) 118/86    Intake/Output Summary (Last 24 hours) at 08/13/17 0823  Last data filed at 08/13/17 0600   Gross per 24 hour   Intake             2845 ml   Output                0 ml   Net             2845 ml     Flowsheet Rows         First Filed Value    Admission Height  72\" (182.9 cm) Documented at 08/12/2017 1132    Admission Weight  200 lb (90.7 kg) Documented at 08/12/2017 1132          Physical Exam:   General Appearance:    Alert, cooperative, in no acute distress   Lungs:     Clear to auscultation,respirations regular, even and                  unlabored    Heart:    Regular rhythm and normal rate, normal S1 and S2, no            murmur   Chest Wall:    No abnormalities observed   Abdomen:     Obese, soft, possible mild lower abdominal tenderness    Neuro:   Conscious, alert, Disoriented ×3    Extremities:   Moves all extremities well, no edema, no cyanosis, no             Redness          Results Review:          Results from last 7 days  Lab Units 08/13/17  0601 08/12/17  1144   SODIUM mmol/L 147* 146*   POTASSIUM mmol/L 3.6 4.3   CHLORIDE mmol/L 116* 110*   CO2 mmol/L 19.0* 22.9   BUN mg/dL 27* 36*   CREATININE mg/dL 1.36* 1.92*   GLUCOSE mg/dL 95 158*   CALCIUM mg/dL 8.7 10.0       Results from " last 7 days  Lab Units 08/12/17  1144   TROPONIN T ng/mL 0.024       Results from last 7 days  Lab Units 08/13/17  0601 08/12/17  1144   WBC 10*3/mm3 4.33* 7.05   HEMOGLOBIN g/dL 8.0* 9.2*   HEMATOCRIT % 28.0* 33.1*   PLATELETS 10*3/mm3 151 181       Results from last 7 days  Lab Units 08/12/17  1144   INR  2.34*         @LABRCNT(bnp)@  I reviewed the patient's new clinical results.  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 Check INR in AM  - Transfer to floor    Junie Hartmann MD  08/13/17  8:23 AM              EMR Dragon/Transcription disclaimer:   Much of this encounter note is an electronic transcription/translation of spoken language to printed text. The electronic translation of spoken language may permit erroneous, or at times, nonsensical words or phrases to be inadvertently transcribed; Although I have reviewed the note for such errors, some may still exist.

## 2017-08-14 PROBLEM — N28.9 ACUTE ON CHRONIC RENAL INSUFFICIENCY: Status: ACTIVE | Noted: 2017-01-01

## 2017-08-14 PROBLEM — I47.29 NONSUSTAINED VENTRICULAR TACHYCARDIA (HCC): Status: ACTIVE | Noted: 2017-01-01

## 2017-08-14 PROBLEM — E86.0 DEHYDRATION: Status: ACTIVE | Noted: 2017-01-01

## 2017-08-14 PROBLEM — F03.90 DEMENTIA (HCC): Status: ACTIVE | Noted: 2017-01-01

## 2017-08-14 PROBLEM — E87.0 HYPERNATREMIA: Status: ACTIVE | Noted: 2017-01-01

## 2017-08-14 PROBLEM — N18.9 ACUTE ON CHRONIC RENAL INSUFFICIENCY: Status: ACTIVE | Noted: 2017-01-01

## 2017-08-14 NOTE — THERAPY EVALUATION
Acute Care - Speech Language Pathology   Swallow Initial Evaluation [facility]     Patient Name: Darrian Davis  : 1940  MRN: [id number]  Today's Date: 2017               Admit Date: 2017    SPEECH-LANGUAGE PATHOLOGY EVALUATION - SWALLOW  Subjective: The patient was seen on this date for a Clinical Swallow evaluation.  Patient was alert and cooperative. Pt agreeable to limited trials PO this date. RN warned SLP pt frustrated and physical at times.    The patient's history is significant for sepsis, renal insufficiency, developing R lobe PNA and dementia.   Objective: Textures given included thin liquid, nectar thick liquid, puree consistency and mechanical soft consistency.  Assessment: Difficulties were noted with mechanical soft consistency, characterized by inadequate mastication requiring expectoration.   SLP Findings:  Patient presents with  oropharyngeal dysphagia, without esophageal component.   Recommendations: Diet Textures: thin liquid, puree with some mashed (fork mashed) consistency food.  Medications should be taken crushed with puree.   Recommended Strategies: Upright for PO, small bites and sips and no straw. Oral care before breakfast, after all meals and PRN.  Other Recommended Evaluations: Re-evaluation at bedside    Dysphagia therapy is recommended. Rationale: Tolerate least restrictive diet.    Visit Dx:     ICD-10-CM ICD-9-CM   1. Sepsis, due to unspecified organism A41.9 038.9     995.91   2. Pneumonia of right lung due to infectious organism, unspecified part of lung J18.9 483.8   3. Renal insufficiency N28.9 593.9     Patient Active Problem List   Diagnosis   • Severe sepsis   • Acute on chronic renal insufficiency   • Hypernatremia   • Dehydration   • Atrial fibrillation   • Nonsustained ventricular tachycardia   • Dementia     Past Medical History:   Diagnosis Date   • Anxiety    • Arthritis    • Atrial fibrillation    • CAD (coronary artery disease)    • COPD (chronic  obstructive pulmonary disease)    • Dementia    • Depression    • DM (diabetes mellitus), type 2    • DVT (deep venous thrombosis)    • Hypertension    • Kidney stone    • PE (pulmonary thromboembolism)    • Prostate cancer    • Skin cancer    • Stroke      Past Surgical History:   Procedure Laterality Date   • BACK SURGERY     • COLONOSCOPY     • HIP SURGERY     • PROSTATE SURGERY            SWALLOW EVALUATION (last 72 hours)      Swallow Evaluation       08/14/17 1030                Rehab Evaluation    Document Type evaluation  -OC        Subjective Information no complaints;agree to therapy  -OC        Patient Effort, Rehab Treatment good  -OC        Symptoms Noted During/After Treatment none  -OC        General Information    Patient Profile Review yes  -OC        Current Diet Limitations thin liquids;regular solid  -OC        Plans/Goals Discussed With patient;agreed upon  -OC        Barriers to Rehab cognitive status  -OC        Clinical Impression    Patient's Goals For Discharge patient did not state  -OC        SLP Swallowing Diagnosis oral dysfunction;pharyngeal dysfunction  -OC        Rehab Potential/Prognosis, Swallowing fair, will monitor progress closely  -OC        Criteria for Skilled Therapeutic Interventions Met skilled criteria for dysphagia intervention met  -OC        FCM, Swallowing 4-->Level 4  -OC        Therapy Frequency PRN  -OC        Predicted Duration Therapy Interv (days) until discharge  -OC        Expected Duration Therapy Session (min) 15-30 minutes  -OC        SLP Diet Recommendation III - pureed with some mashed;nectar/syrup-thick liquids  -OC        Recommended Diagnostics reassess via clinical swallow (non-instrumental exam)  -OC        Recommended Feeding/Eating Techniques alternate between small bites and sips of food/liquid;maintain upright posture during/after eating for 30 mins;small sips/bites  -OC        SLP Rec. for Method of Medication Administration meds crushed in  pudding/applesauce  -OC        Monitor For Signs Of Aspiration cough;elevated WBC count;gurgly voice;throat clearing  -OC        Anticipated Discharge Disposition placement for care  -OC        Pain Assessment    Pain Assessment No/denies pain  -OC        Cognitive Assessment/Intervention    Current Cognitive/Communication Assessment impaired  -OC        Oral Motor Structure and Function    Oral Motor Anatomy and Physiology patient demonstrates anatomy and physiology that is WNL  -OC        Dentition Assessment edentulous, does not have dentures;other (see comments)   dentures not present  -OC        Secretion Management WNL/WFL  -OC        Volitional Swallow mild to moderate difficulties initiating volitional swallow  -OC        Volitional Cough no difficulties initiating volitional cough  -OC        Oral Musculature General Assessment other (see comments)   unable to follow oral motor commands  -OC          User Key  (r) = Recorded By, (t) = Taken By, (c) = Cosigned By    Initials Name Effective Dates    OC Nazia Connor MA,CCC-SLP 04/05/17 -         EDUCATION  The patient has been educated in the following areas:   Dysphagia (Swallowing Impairment).    SLP Recommendation and Plan  SLP Swallowing Diagnosis: oral dysfunction, pharyngeal dysfunction  SLP Diet Recommendation: III - pureed with some mashed, nectar/syrup-thick liquids  Recommended Feeding/Eating Techniques: alternate between small bites and sips of food/liquid, maintain upright posture during/after eating for 30 mins, small sips/bites  SLP Rec. for Method of Medication Administration: meds crushed in pudding/applesauce  Monitor For Signs Of Aspiration: cough, elevated WBC count, gurgly voice, throat clearing  Recommended Diagnostics: reassess via clinical swallow (non-instrumental exam)  Criteria for Skilled Therapeutic Interventions Met: skilled criteria for dysphagia intervention met  Anticipated Discharge Disposition: placement for care  Rehab  Potential/Prognosis, Swallowing: fair, will monitor progress closely  Therapy Frequency: PRN             Plan of Care Review  Plan Of Care Reviewed With: patient  Outcome Summary/Follow up Plan: Bedside swallow eval completed. Recommend fork mashed with thin liquids. Meds crushed with puree.          IP SLP Goals       08/14/17 1030          Safely Consume Diet    Safely Consume Diet- SLP, Date Established 08/14/17  -OC      Safely Consume Diet- SLP, Time to Achieve by discharge  -OC      Safely Consume Diet- SLP, Outcome goal ongoing  -OC        User Key  (r) = Recorded By, (t) = Taken By, (c) = Cosigned By    Initials Name Provider Type    MILAN Connor MA,CCC-SLP Speech and Language Pathologist             SLP Outcome Measures (last 72 hours)      SLP Outcome Measures       08/14/17 1030          SLP Outcome Measures    Outcome Measure Used? Adult NOMS  -OC      FCM Scores    FCM Chosen Swallowing  -OC      Swallowing FCM Score 4  -OC        User Key  (r) = Recorded By, (t) = Taken By, (c) = Cosigned By    Initials Name Effective Dates    OC Nazia Connor MA,WING-SLP 04/05/17 -            Time Calculation:         Time Calculation- SLP       08/14/17 1045          Time Calculation- SLP    SLP Start Time 1000  -OC      SLP Stop Time 1045  -OC      SLP Time Calculation (min) 45 min  -OC      SLP Received On 08/14/17  -OC        User Key  (r) = Recorded By, (t) = Taken By, (c) = Cosigned By    Initials Name Provider Type    MILAN Connor MA,CCC-SLP Speech and Language Pathologist          Therapy Charges for Today     Code Description Service Date Service Provider Modifiers Qty    [id number] HC ST EVAL ORAL PHARYNG SWALLOW 3 8/14/2017 Nazia Connor MA,WING-SLP GN 1               Nazia Connor MA,WING-SLP  8/14/2017

## 2017-08-14 NOTE — PLAN OF CARE
Problem: Patient Care Overview (Adult)  Goal: Plan of Care Review  Outcome: Ongoing (interventions implemented as appropriate)    08/14/17 1603   Coping/Psychosocial Response Interventions   Plan Of Care Reviewed With patient   Outcome Evaluation   Outcome Summary/Follow up Plan Pt. cont. in a-fib. Occass. run VTach, being followed by cardio. Pt. afebrile. Appetite is good,pt is feed. No skin issues identified at this time, pt. on bedrest. Pt. is falls risk due to confusion and impaired mobility. Pt. can be combative at times. Will cont. to monitor. 08/14/17   Patient Care Overview   Progress no change         Problem: Sepsis (Adult)  Goal: Signs and Symptoms of Listed Potential Problems Will be Absent or Manageable (Sepsis)  Outcome: Ongoing (interventions implemented as appropriate)    Problem: Skin Integrity Impairment, Risk/Actual (Adult)  Goal: Identify Related Risk Factors and Signs and Symptoms  Outcome: Ongoing (interventions implemented as appropriate)  Goal: Skin Integrity/Wound Healing  Outcome: Ongoing (interventions implemented as appropriate)    Problem: Fall Risk (Adult)  Goal: Identify Related Risk Factors and Signs and Symptoms  Outcome: Ongoing (interventions implemented as appropriate)  Goal: Absence of Falls  Outcome: Ongoing (interventions implemented as appropriate)

## 2017-08-14 NOTE — CONSULTS
"Date of Hospital Visit: 17  Encounter Provider: Hossein Hoffmann MD  Place of Service: [facility] CARDIOLOGY  Patient Name: Darrian Davis  :1940  Referral Provider: Junie Hartmann MD    Chief complaint: Consulted for questionable chest pain and NSVT     History of Present Illness:     Mr. Davis is a 77 year old man with numerous medical issues who was brought it from his NH with altered mental status, hypotension, and labored breathing.      He has severe dementia and is unable to give me any good history.  He has marked echolalia and repeats almost everything I ask him.      He has a reported history of CAD via cath in his 30's for MI, PE/DVT, chronic atrial fibrillation (on warfarin), TIAs, HTN, DM, and COPD.  He was seen by [facility] in . He had a cardiac workup in May 2014 which included an echocardiogram that showed an EF of 55-60% with mild to moderate MR/TR, severe PAH with septal flattening of the interventricular septum and RVSP 57 mmHg. Lexiscan Cardiolite stress test showed an EF of 55% and no evidence of ischemia or infarction.  He had a Holter monitor which revealed underlying atrial fibrillation with frequent premature ventricular contractions,as well as 4 runs of nonsustained ventricular tachycardia, the longest of which was 10 beats.    His diagnoses at this time include dehydration, hypernatremia, acute on chronic renal failure, anemia, heme positive stools, and possible diverticulitis.     He has been noted to have runs of NSVT, ranging form 4-18 beats . These only occur during sleep.  His POx at these times isn't clear, but he hasn't had too much of an issue with hypoxia per his morning nurse.  He was asked overnight if he has chest pain, and he then said \"chest pain,\" but again, he repeats every thing that is asked.      Past Medical History:   Diagnosis Date   • Anxiety    • Arthritis    • Atrial fibrillation    • CAD (coronary artery disease)    • COPD " (chronic obstructive pulmonary disease)    • Dementia    • Depression    • DM (diabetes mellitus), type 2    • DVT (deep venous thrombosis)    • Hypertension    • Kidney stone    • PE (pulmonary thromboembolism)    • Prostate cancer    • Skin cancer    • Stroke        Past Surgical History:   Procedure Laterality Date   • BACK SURGERY     • COLONOSCOPY     • HIP SURGERY     • PROSTATE SURGERY         Prior to Admission medications    Medication Sig Start Date End Date Taking? Authorizing Provider   ALPRAZolam (XANAX) 0.25 MG tablet Take 0.25 mg by mouth 2 (Two) Times a Day As Needed for Anxiety.   Yes Historical Provider, MD   aspirin 81 MG tablet Take 81 mg by mouth Daily.   Yes Historical Provider, MD   bumetanide (BUMEX) 2 MG tablet Take 2 mg by mouth 2 (Two) Times a Day.   Yes Historical Provider, MD   diphenoxylate-atropine (LOMOTIL) 2.5-0.025 MG per tablet Take 1 tablet by mouth Daily As Needed for Diarrhea.   Yes Historical Provider, MD   gabapentin (NEURONTIN) 300 MG capsule Take 300 mg by mouth 4 (Four) Times a Day.   Yes Historical Provider, MD   HYDROcodone-acetaminophen (NORCO) 7.5-325 MG per tablet Take 1 tablet by mouth Every 4 (Four) Hours As Needed for Moderate Pain (4-6).   Yes Historical Provider, MD   metoprolol succinate XL (TOPROL-XL) 100 MG 24 hr tablet Take 100 mg by mouth Daily.   Yes Historical Provider, MD   Multiple Vitamins-Minerals (MULTIVITAMIN ADULT PO) Take  by mouth Daily.   Yes Historical Provider, MD   nitroglycerin (NITROSTAT) 0.4 MG SL tablet Place 0.4 mg under the tongue Every 5 (Five) Minutes As Needed for Chest Pain. Take no more than 3 doses in 15 minutes.   Yes Historical Provider, MD   Omega-3 Fatty Acids (FISH OIL) 1000 MG capsule capsule Take 1,000 mg by mouth Daily With Breakfast.   Yes Historical Provider, MD   Potassium Chloride (KLOR-CON PO) Take 20 mEq by mouth 2 (Two) Times a Day.   Yes Historical Provider, MD   traZODone (DESYREL) 100 MG tablet Take 100 mg by mouth  "Every Night.   Yes Historical Provider, MD   warfarin (COUMADIN) 1 MG tablet TAKE ONE TABLET BY MOUTH ONCE DAILY FOR 30 DAYS OR AS DIRECTED WEEKLY TO KEEP INR 2-3 4/3/17  Yes Raj Ordoñez Jr., MD   warfarin (COUMADIN) 5 MG tablet Take 2.5 mg by mouth Daily.   Yes Historical Provider, MD       Social History     Social History   • Marital status: Single     Spouse name: N/A   • Number of children: N/A   • Years of education: N/A     Occupational History   • Not on file.     Social History Main Topics   • Smoking status: Former Smoker   • Smokeless tobacco: Not on file   • Alcohol use No   • Drug use: No   • Sexual activity: Defer     Other Topics Concern   • Not on file     Social History Narrative       Family History   Problem Relation Age of Onset   • Family history unknown: Yes       Review of Systems   Unable to perform ROS: Dementia   Genitourinary: Positive for penile swelling.       Objective:     Vitals:    08/14/17 0250 08/14/17 0251 08/14/17 0747 08/14/17 0759   BP:  121/74 (!) 138/116 127/81   BP Location:  Right arm Left arm Right arm   Patient Position:    Lying   Pulse: 72 74 84    Resp:   20    Temp:   97.8 °F (36.6 °C)    TempSrc:   Oral    SpO2: 97% 95% 96%    Weight:       Height:         Body mass index is 26.04 kg/(m^2).  Flowsheet Rows         First Filed Value    Admission Height  72\" (182.9 cm) Documented at 08/12/2017 1132    Admission Weight  200 lb (90.7 kg) Documented at 08/12/2017 1132          Physical Exam   Constitutional: He appears lethargic.   HENT:   Head: Normocephalic.   Nose: Nose normal.   Mouth/Throat: Mucous membranes are dry.   Eyes: Conjunctivae are normal.   Cardiovascular: Normal rate, normal heart sounds and intact distal pulses.  An irregularly irregular rhythm present.   Pulmonary/Chest: Breath sounds normal.   Abdominal: Soft.   Neurological: He appears lethargic.   Skin: Skin is warm and dry. No erythema.   Psychiatric: Cognition and memory are impaired. He is " noncommunicative (echolalia).   Vitals reviewed.              Lab Review:                  Results from last 7 days  Lab Units 08/14/17  0234   SODIUM mmol/L 142   POTASSIUM mmol/L 3.6   CHLORIDE mmol/L 111*   CO2 mmol/L 19.4*   BUN mg/dL 22   CREATININE mg/dL 1.28*   GLUCOSE mg/dL 109*   CALCIUM mg/dL 9.4       Results from last 7 days  Lab Units 08/14/17  0234 08/13/17  0601 08/12/17  1144   TROPONIN T ng/mL 0.025 0.019 0.024       Results from last 7 days  Lab Units 08/13/17  0601   WBC 10*3/mm3 4.33*   HEMOGLOBIN g/dL 8.0*   HEMATOCRIT % 28.0*   PLATELETS 10*3/mm3 151       Results from last 7 days  Lab Units 08/14/17  0234 08/12/17  1144   INR  2.57* 2.34*           Results from last 7 days  Lab Units 08/14/17  0234   MAGNESIUM mg/dL 1.7       TELE:                 Previous:     I personally viewed and interpreted the patient's EKG/Telemetry data    Assessment/Plan:     1.  NSVT -- he was known to have this in 2014 as well. It occurs predominantly during sleep.  I think it's reasonable to recheck an echo to see if there has been much change in the last 3 years, but he is not a candidate for any further ischemic workup after that.  His EKG is unchanged compared to prior studies.  His metoprolol has been discontinued due to hypotension and mild bradycardia on arrival.  His BP is improved now.  I do not feel he should be on 100mg daily, and given his renal dysfunction, I would prefer metoprolol tartrate over succinate.  I will restart metoprolol tartrate at 12.5mg BID for now.    2.  Questionable chest pain -- quite simply, a history cannot be obtained from him.  Again, his EKG has no ischemic changes. His Tn is negative.     3.  Chronic atrial fibrillation -- rate controlled.  He's on warfarin with a therapeutic INR but is anemic and has heme positive stools.    4.  ADRIEL on CKD -- improved with IVF    5.  Dehydration/hypernatremia -- improved with IVF    6.  Dementia    7.  Sepsis, possible  diverticulitis      Add:  Echo with normal biventricular size/systolic function/wall motion.  The left atrium is dilated.  There is valvular thickening but no significant valvular dysfunction.  His RVSP is normal.      Will see how he does with the re-addition of beta blocker.  Otherwise, no further workup planned.

## 2017-08-14 NOTE — PROGRESS NOTES
Discharge Planning Assessment  [facility]     Patient Name: Darrian Davis  MRN: [id number]  Today's Date: 8/14/2017    Admit Date: 8/12/2017          Discharge Needs Assessment       08/14/17 1552    Living Environment    Lives With facility resident   Helen lives at [facility], but was sent to [facility] from the [facility].     Living Arrangements extended care facility    Home Accessibility no concerns    Stair Railings at Home none    Type of Financial/Environmental Concern none    Transportation Available ambulance    Living Environment    Quality Of Family Relationships supportive    Able to Return to Prior Living Arrangements yes    Discharge Needs Assessment    Concerns To Be Addressed denies needs/concerns at this time    Anticipated Changes Related to Illness inability to care for self    Equipment Currently Used at Home --   unusre    Discharge Disposition still a patient            Discharge Plan       08/14/17 2109    Case Management/Social Work Plan    Plan family hoping patient will be able to return to the [facility]    Patient/Family In Agreement With Plan yes    Additional Comments IMM letter signed.  [employer] verified.  Call placed to daughter/POA, Diana Kerr ( [phone]).  Michael lives at [facility] and is currently receiving inpatient treatment at the [facility].  She thinks that patient needs to return there at [place].  Call placed to the [facility] ( [phone]) and spoke with Gina in Evaluation/Referrals.  Once we have an anticipated dc date, will need to call and discuss case with an .  May also need to fax updated clinicals.  As long as patient still  meets criteria for inpatient psych, Gina anticipates they will be able to accept back as a direct admit.   CCP will follow.         Discharge Placement     No information found                Demographic Summary       08/14/17 1545    Referral Information    Admission Type inpatient    Arrived From admitted as an  inpatient    Referral Source admission list    Reason For Consult discharge planning    Primary Care Physician Information    Name Dr Narciso Ma            Functional Status       08/14/17 9938    Functional Status Current    Ambulation 3-->assistive equipment and person    Transferring 3-->assistive equipment and person    Toileting 3-->assistive equipment and person    Bathing 3-->assistive equipment and person    Dressing 3-->assistive equipment and person    Eating 3-->assistive equipment and person    Communication 2-->difficulty understanding (not related to language barrier)    Swallowing (if score 2 or more for any item, consult Rehab Services) 2-->difficulty swallowing liquids/foods    Change in Functional Status Since Onset of Current Illness/Injury no    Functional Status Prior    Ambulation 3-->assistive equipment and person    Transferring 3-->assistive equipment and person    Toileting 3-->assistive equipment and person    Bathing 3-->assistive equipment and person    Dressing 3-->assistive equipment and person    Eating 2-->assistive person    Communication 2-->difficulty understanding and speaking (not related to language barrier)    Swallowing 2-->difficulty swallowing foods            Psychosocial     None            Abuse/Neglect     None            Legal     None            Substance Abuse     None            Patient Forms     None          Rachel Goncalves, ANTONIA

## 2017-08-14 NOTE — PROGRESS NOTES
"                                              LOS: 2 days   Patient Care Team:  Narciso Ma MD as PCP - General (Family Medicine)    Chief Complaint:  ICU follow-up for hypertension, possible sepsis, lactic acidosis and renal failure    Interval History:   Dementia with confusion. No fever overnight. No significant arrhythmia. He has no complaints but he's demented.      REVIEW OF SYSTEMS:   Cannot obtain.  Patient has dementia.    Ventilator/Non-Invasive Ventilation Settings     None            Vital Signs  Temp:  [97.5 °F (36.4 °C)-98.1 °F (36.7 °C)] 97.8 °F (36.6 °C)  Heart Rate:  [57-84] 84  Resp:  [16-20] 20  BP: (115-146)/() 127/81  No intake or output data in the 24 hours ending 08/14/17 1210  Flowsheet Rows         First Filed Value    Admission Height  72\" (182.9 cm) Documented at 08/12/2017 1132    Admission Weight  200 lb (90.7 kg) Documented at 08/12/2017 1132          Physical Exam:   General Appearance:    Alert, cooperative at time but pleasantly demented, in no acute distress   Lungs:     Clear to auscultation,respirations regular, even and                  unlabored    Heart:    Irregular rhythm and normal rate   Chest Wall:    No abnormalities observed   Abdomen:     Obese, soft, no abdominal tenderness    Neuro:   Conscious, alert, Disoriented ×3    Extremities:   Moves all extremities well, no edema, no cyanosis, no             Redness          Results Review:          Results from last 7 days  Lab Units 08/14/17  0234 08/13/17  0601 08/12/17  1144   SODIUM mmol/L 142 147* 146*   POTASSIUM mmol/L 3.6 3.6 4.3   CHLORIDE mmol/L 111* 116* 110*   CO2 mmol/L 19.4* 19.0* 22.9   BUN mg/dL 22 27* 36*   CREATININE mg/dL 1.28* 1.36* 1.92*   GLUCOSE mg/dL 109* 95 158*   CALCIUM mg/dL 9.4 8.7 10.0       Results from last 7 days  Lab Units 08/14/17  0234 08/13/17  0601 08/12/17  1144   TROPONIN T ng/mL 0.025 0.019 0.024       Results from last 7 days  Lab Units 08/13/17  0601 08/12/17  1144   WBC " 10*3/mm3 4.33* 7.05   HEMOGLOBIN g/dL 8.0* 9.2*   HEMATOCRIT % 28.0* 33.1*   PLATELETS 10*3/mm3 151 181       Results from last 7 days  Lab Units 08/14/17  0234 08/12/17  1144   INR  2.57* 2.34*         @LABRCNT(bnp)@  I reviewed the patient's new clinical results.  I personally viewed and interpreted the patient's CXR        Medication Review:     levoFLOXacin 750 mg Intravenous Q24H   metoprolol tartrate 12.5 mg Oral Q12H   warfarin 2.5 mg Oral Daily         dextrose 5 % and sodium chloride 0.45 % 100 mL/hr Last Rate: 100 mL/hr (08/14/17 1128)       Diagnostic imaging:  I personally and independently reviewed the         Assessment:     1) ADRIEL on CKD, improved- at baseline now  2) Hypotension, 2ary to #5 +/- #3 in addition to antihypertensive and diuretics  3) Sepsis unlikely  4) Hypernatremia  5) Dehydration  6) Possible Sigmoid diverticulitis vs  diverticulosis  7) Bilateral nonobstructing kidney stones  8) Left adrenal mass, 2.9 cm, stable since 2014  9) Anemia, chronic, microcytic- possibly Iron deff and CKD  10) Positive stool occult blood  11) Relative bradycardia  12) Lactic acidosis, 2ary to dehydration vs sepsis  13) Hyperchloremic metabolic acidosis     Other medical problems:  -Dementia  -Recurrent DVT/PE  -Afib  -Anticoagularion with Warfarin  -COPD  -Prior TIA  -HTN    Plan   - DC IV fluid  - Echo and low dose BB by cardiology. Appreciate input  - Check Ferritin  - Continue Levofloxacin day 3 for possible diverticulitis  - Continue Holding Bumex   - Warfarin  - Will transfer back to his nursing home soon    Junie Hartmann MD  08/14/17  12:10 PM              EMR Dragon/Transcription disclaimer:   Much of this encounter note is an electronic transcription/translation of spoken language to printed text. The electronic translation of spoken language may permit erroneous, or at times, nonsensical words or phrases to be inadvertently transcribed; Although I have reviewed the note for such errors, some may  still exist.

## 2017-08-14 NOTE — NURSING NOTE
During brief change, patient grabbed and twisted wrist of nursing assistant. Patient also scractched nursing assistant.

## 2017-08-14 NOTE — PLAN OF CARE
Problem: Patient Care Overview (Adult)  Goal: Plan of Care Review  Outcome: Ongoing (interventions implemented as appropriate)  Goal: Adult Individualization and Mutuality  Outcome: Ongoing (interventions implemented as appropriate)    08/13/17 2008   Mutuality/Individual Preferences   What Information Would Help Us Give You More Personalized Care? Patient transfer from ICU. Bedrest. Five beat run followed by 11 beat run vtach noted and MD notified. See orders. Diet ordered regular cardiac diet. Fluids continuous. VS and labs monitored.        Goal: Discharge Needs Assessment  Outcome: Ongoing (interventions implemented as appropriate)    Problem: Sepsis (Adult)  Goal: Signs and Symptoms of Listed Potential Problems Will be Absent or Manageable (Sepsis)  Outcome: Ongoing (interventions implemented as appropriate)    Problem: Skin Integrity Impairment, Risk/Actual (Adult)  Goal: Identify Related Risk Factors and Signs and Symptoms  Outcome: Ongoing (interventions implemented as appropriate)  Goal: Skin Integrity/Wound Healing  Outcome: Ongoing (interventions implemented as appropriate)    Problem: Fall Risk (Adult)  Goal: Identify Related Risk Factors and Signs and Symptoms  Outcome: Ongoing (interventions implemented as appropriate)  Goal: Absence of Falls  Outcome: Ongoing (interventions implemented as appropriate)

## 2017-08-14 NOTE — PLAN OF CARE
Problem: Patient Care Overview (Adult)  Goal: Plan of Care Review  Outcome: Ongoing (interventions implemented as appropriate)    08/14/17 1030   Coping/Psychosocial Response Interventions   Plan Of Care Reviewed With patient   Outcome Evaluation   Outcome Summary/Follow up Plan Bedside swallow eval completed. Recommend fork mashed with thin liquids. Meds crushed with puree.         Problem: Inpatient SLP  Goal: Dysphagia- Patient will safely consume diet as per recommendation with no signs/symptoms of aspiration  Outcome: Ongoing (interventions implemented as appropriate)    08/14/17 1030   Safely Consume Diet   Safely Consume Diet- SLP, Date Established 08/14/17   Safely Consume Diet- SLP, Time to Achieve by discharge   Safely Consume Diet- SLP, Outcome goal ongoing

## 2017-08-15 NOTE — PLAN OF CARE
Problem: Patient Care Overview (Adult)  Goal: Plan of Care Review  Outcome: Outcome(s) achieved Date Met:  08/15/17    08/15/17 7166   Coping/Psychosocial Response Interventions   Plan Of Care Reviewed With patient   Outcome Evaluation   Outcome Summary/Follow up Plan Pt. remains confused with history of dementia. Combative and agressive at times. Spitting food and medications out. No falls this stay. Skin integrity remains intact. Pt. cont. in afib. Report called to the [facility]. Paperwork sent with py. Unable to provide education to pt. due to mental status. 08/15/17   Patient Care Overview   Progress improving         Problem: Sepsis (Adult)  Goal: Signs and Symptoms of Listed Potential Problems Will be Absent or Manageable (Sepsis)  Outcome: Outcome(s) achieved Date Met:  08/15/17    Problem: Skin Integrity Impairment, Risk/Actual (Adult)  Goal: Identify Related Risk Factors and Signs and Symptoms  Outcome: Outcome(s) achieved Date Met:  08/15/17  Goal: Skin Integrity/Wound Healing  Outcome: Outcome(s) achieved Date Met:  08/15/17    Problem: Fall Risk (Adult)  Goal: Identify Related Risk Factors and Signs and Symptoms  Outcome: Outcome(s) achieved Date Met:  08/15/17  Goal: Absence of Falls  Outcome: Outcome(s) achieved Date Met:  08/15/17

## 2017-08-15 NOTE — PROGRESS NOTES
Continued Stay Note  [facility]     Patient Name: Darrian Davis  MRN: [id number]  Today's Date: 8/15/2017    Admit Date: 8/12/2017          Discharge Plan       08/15/17 1459    Case Management/Social Work Plan    Plan [facility]    Patient/Family In Agreement With Plan yes    Additional Comments Spoke with Nikki/Cristin Langford and information faxed to [phone].  She states that they can accept [place].  Spoke with daughter Diana by telephone and she is in agreement.  [facility] scheduled for 5:30 p.m.  Packet to RN.              Discharge Codes     None        Expected Discharge Date and Time     Expected Discharge Date Expected Discharge Time    Aug 15, 2017             Becky S. [place], RN

## 2017-08-15 NOTE — DISCHARGE SUMMARY
DISCHARGE SUMMARY    Patient Name: Darrian Davis  Age/Sex: 77 y.o. male  : 1940  MRN: [id number]  Patient Care Team:  Narciso Ma MD as PCP - General (Family Medicine)       Date of Admit: 2017  Date of Discharge:  08/15/17  Discharge Condition: Good    Discharge Diagnoses:  1) ADRIEL on CKD, improved- at baseline On discharge  2) Hypotension, 2ary to #5 +/- #3 in addition to antihypertensive and diuretics  3) Sepsis unlikely  4) Hypernatremia  5) Dehydration  6) Sigmoid diverticulosis (questionable diverticulitis)  7) Bilateral nonobstructing kidney stones  8) Left adrenal mass, 2.9 cm, stable since   9) Chronic microcytic iron deficiency anemia  10) Positive stool occult blood  11) Relative bradycardia, Resolved  12) Lactic acidosis, 2ary to dehydration vs sepsis  13) Hyperchloremic metabolic acidosis, Resolved, resolved     Other medical problems:  -Dementia  -Recurrent DVT/PE  -Afib  -Anticoagularion with Warfarin  -COPD  -Prior TIA  -HTN    History of present illness:   This is a 77-year-old male patient with dementia.  He lives at the nursing home.  He was noted to be unresponsive at the nursing home and his blood pressure was low in the 70.  EMS was called and patient was transferred to the ED.  He received IV fluid route.  Upon arrival to ED, patient was awake and following commands but his blood pressure was low.  He received IV fluid and was started empirically on Zosyn and azithromycin for possible sepsis.     I interviewed and examined the patient in ICU.  He looks dry.  He is pleasantly demented.  He does not have any complaint.  He was not able to follow all commands due to his underlying dementia.        Labs:  Sodium = 146; creatinine = 1.9 (baseline 1.4-1.5); BUN = 36; lactic acid = 3.1; INR = 2.3; hemoglobin = 9.2    Hospital Course:   Patient was initially admitted to the intensive care unit because of his hypotension.  He was thought to have severe sepsis however this  diagnosis was questionable because of lack of laboratory/radiographic evidence of infection.  His chest x-ray was clear.  CT of the abdomen showed some diverticulosis.  Pro-calcitonin was normal.  He improved quickly with IV hydration and therefore he was transferred to the floor the next day.  Evidently, his diuretic (Bumex 2 mg twice a day(was stopped as well as metoprolol because of his hypotension.  Metoprolol was later on resumed.  He was also placed on Levaquin for potential diverticulitis though this is questionable as well.  Unfortunately, patient has extensive dementia and cannot provide history or express his complains.     He had an echocardiogram which revealed normal EF but dilated LA with mild-to-moderate MR.  His previous echo also showed normal EF and normal diastolic dysfunction with MR and dilated LA.  Therefore, I think he has mild degree of heart failure and does not need to be diuresed daily.  He was discharged on Lasix 20 mg every day as needed for weight gain more than 2 pounds/day or more than 5 pounds/week.  He needs to follow with his primary care within 1-2 weeks for assessment of his volume status and need for diuresis.    Hemoglobin was stable.  Ferritin level was low and therefore he was started on iron supplements.    Consults:   IP CONSULT TO PULMONOLOGY  IP CONSULT TO NUTRITION SERVICES  IP CONSULT TO CARDIOLOGY    Significant Discharge Diagnostics   Procedures Performed:         Pertinent Lab Results:    Results from last 7 days  Lab Units 08/15/17  0833 08/14/17  0234 08/13/17  0601 08/12/17  1144   SODIUM mmol/L 139 142 147* 146*   POTASSIUM mmol/L 4.2 3.6 3.6 4.3   CHLORIDE mmol/L 106 111* 116* 110*   CO2 mmol/L 20.6* 19.4* 19.0* 22.9   BUN mg/dL 15 22 27* 36*   CREATININE mg/dL 1.21 1.28* 1.36* 1.92*   GLUCOSE mg/dL 109* 109* 95 158*   CALCIUM mg/dL 9.8 9.4 8.7 10.0   AST (SGOT) U/L  --   --   --  16   ALT (SGPT) U/L  --   --   --  18   ALBUMIN g/dL  --   --   --  3.40*        Results from last 7 days  Lab Units 08/14/17  0234 08/13/17  0601 08/12/17  1144   TROPONIN T ng/mL 0.025 0.019 0.024       Results from last 7 days  Lab Units 08/15/17  0833 08/13/17  0601 08/12/17  1144   WBC 10*3/mm3 4.65 4.33* 7.05   HEMOGLOBIN g/dL 8.9* 8.0* 9.2*   HEMATOCRIT % 31.3* 28.0* 33.1*   PLATELETS 10*3/mm3 176 151 181   MCV fL 76.7* 78.4* 78.4*   MCH pg 21.8* 22.4* 21.8*   MCHC g/dL 28.4* 28.6* 27.8*   RDW % 16.9* 17.2* 16.9*   RDW-SD fl 47.8 49.1 48.5   MPV fL 10.0 9.9 9.0   NEUTROPHIL % % 58.5 63.2 70.6   LYMPHOCYTE % % 32.3 26.8 20.0   MONOCYTES % % 7.7 7.9 7.5   EOSINOPHIL % % 1.1 0.9 0.9   BASOPHIL % % 0.4 1.2 0.7   IMM GRAN % % 0.0 0.0 0.3   NEUTROS ABS 10*3/mm3 2.72 2.74 4.98   LYMPHS ABS 10*3/mm3 1.50 1.16 1.41   MONOS ABS 10*3/mm3 0.36 0.34 0.53   EOS ABS 10*3/mm3 0.05 0.04 0.06   BASOS ABS 10*3/mm3 0.02 0.05 0.05   IMMATURE GRANS (ABS) 10*3/mm3 0.00 0.00 0.02   NRBC /100 WBC 0.0  --   --        Results from last 7 days  Lab Units 08/15/17  0833 08/14/17  0234 08/12/17  1144   INR  2.27* 2.57* 2.34*       Results from last 7 days  Lab Units 08/15/17  0833 08/14/17  0234 08/13/17  0601   MAGNESIUM mg/dL 1.8 1.7 1.7                       Results from last 7 days  Lab Units 08/12/17  1552 08/12/17  1144   PROCALCITONIN ng/mL  --  0.16   LACTATE mmol/L 1.5 3.1*               Results from last 7 days  Lab Units 08/12/17  1339 08/12/17  1337   BLOODCX  No growth at 3 days No growth at 3 days       Results from last 7 days  Lab Units 08/12/17  1426   NITRITE UA  Negative   WBC UA /HPF 3-5*   BACTERIA UA /HPF None Seen   SQUAM EPITHEL UA /HPF 0-2               Imaging Results:  Imaging Results (all)     Procedure Component Value Units Date/Time    CT Head Without Contrast [[id number]] Collected:  08/12/17 1247     Updated:  08/12/17 1259    Narrative:       EMERGENCY NONCONTRAST HEAD CT SCAN.     HISTORY: Male who is 77 years-old, with a history of confusion.     COMPARISON: 07/22/2017.      FINDINGS:   1. The basal cisterns and sulci over the convexities are normal for age.     2. The ventricles are normal without displacement.   3. There is scattered focal encephalomalacia and periventricular lucency  consistent with chronic ischemic change.    4. There is no significant interval change nor other evidence of an  acute intracranial process.       Impression:       1. No evidence of acute intracranial process.     This report was finalized on 8/12/2017 12:48 PM by Dr. Horacio Ang MD.       CT Abdomen Pelvis Without Contrast [[id number]] Collected:  08/12/17 1249     Updated:  08/12/17 1302    Narrative:       EMERGENCY NONCONTRAST CT ABDOMEN AND PELVIS     HISTORY: 77-year-old male with abdominal distention and abdominal pain     COMPARISON: 01/18/2014     FINDINGS:  1. Lung bases are clear of an active process on limited views. There is  cardiomegaly..  2. There are gallstones. The liver, biliary system and spleen are  otherwise normal.  3. Stable 2.9 similar left adrenal mass. The right adrenal gland is  normal. There are bilateral nonobstructing kidney stones.  4. There is no obstruction, free air nor dilatation of bowel. There is  diverticulosis of the colon, can't rule out mild diverticulitis of the  sigmoid colon.  5. Fat-containing left inguinal hernia, previous TURP and vascular  calcification without aneurysm. The common iliac arteries are 15 mm  bilaterally.       Impression:       1. Chronic changes as noted.  2. Can't rule out mild sigmoid diverticulitis.  3. Stable left adrenal mass, gallstones, aortic calcification,  spondylosis.     This report was finalized on 8/12/2017 12:59 PM by Dr. Horacio Ang MD.       XR Chest 1 View [[id number]] Collected:  08/12/17 1436     Updated:  08/12/17 1445    Narrative:       EMERGENCY PORTAL CHEST SINGLE VIEW     HISTORY: 77-year-old male with shortness of breath     COMPARISON: 08/02/2017     FINDINGS:  1. Limited imaging due to body habitus  portable technique and low lung  volumes.  2. Developing peripheral opacification right lung inferiorly and  laterally question early pneumonia.  3. CT follow-up should be considered for more accurate assessment.     This report was finalized on 8/12/2017 2:37 PM by Dr. Horacio Ang MD.             Objective:   Temp:  [97.2 °F (36.2 °C)-98.5 °F (36.9 °C)] 98.1 °F (36.7 °C)  Heart Rate:  [67-97] 77  Resp:  [16-18] 16  BP: (139-159)/(83-95) 149/83   SpO2:  [93 %-97 %] 97 %  on    O2 Device: room air  No intake or output data in the 24 hours ending 08/15/17 1528  Body mass index is 26.01 kg/(m^2).  Last 3 weights    08/12/17 2013 08/13/17  0605 08/15/17  0540   Weight: 191 lb 12.8 oz (87 kg) 192 lb (87.1 kg) 191 lb 12.8 oz (87 kg)     Weight change:     Physical Exam:      General: Alert, cooperative, in no acute distress.         HEENT:  No oral lesions. No thrush. Oral mucosa moist.   Chest Wall:  No abnormalities observed.             Neck:  Trachea midline. No JVD.   Pulmonary:  CTAB. No wheezes. Respirations regular, even and unlabored.          Cardio:  Regular rhythm and normal rate. Normal S1 and S2. No murmur, gallop, rub or click.    Abdominal:  Soft, non-tender and non-distended. Normal bowel sounds. No masses. No organomegaly. No guarding. No rebound tenderness.  Extremities:  Moves all extremities well. No cyanosis. No redness. No edema.     Discharge Medications and Instructions:     Discharge Medications   Darrian Davis   Home Medication Instructions ANANDA:[id number]    Printed on:08/15/17 1528   Medication Information                      aspirin 81 MG tablet  Take 81 mg by mouth Daily.             diphenoxylate-atropine (LOMOTIL) 2.5-0.025 MG per tablet  Take 1 tablet by mouth Daily As Needed for Diarrhea.             ferrous sulfate (IRON SUPPLEMENT) 325 (65 FE) MG tablet  Take 1 tablet by mouth 2 (Two) Times a Day.             furosemide (LASIX) 20 MG tablet  Take 1 tablet by mouth Daily As  Needed (for legs swelling or weight gain>2Lb in 2 days or 5Lb in 1 week) for up to 30 days.             gabapentin (NEURONTIN) 300 MG capsule  Take 1 capsule by mouth 3 (Three) Times a Day.             levoFLOXacin (LEVAQUIN) 750 MG tablet  Take 1 tablet by mouth Daily.             metoprolol tartrate (LOPRESSOR) 25 MG tablet  Take 1 tablet by mouth 2 (Two) Times a Day.             Multiple Vitamins-Minerals (MULTIVITAMIN ADULT PO)  Take  by mouth Daily.             nitroglycerin (NITROSTAT) 0.4 MG SL tablet  Place 0.4 mg under the tongue Every 5 (Five) Minutes As Needed for Chest Pain. Take no more than 3 doses in 15 minutes.             Omega-3 Fatty Acids (FISH OIL) 1000 MG capsule capsule  Take 1,000 mg by mouth Daily With Breakfast.             traZODone (DESYREL) 100 MG tablet  Take 100 mg by mouth Every Night.             warfarin (COUMADIN) 1 MG tablet  TAKE ONE TABLET BY MOUTH ONCE DAILY FOR 30 DAYS OR AS DIRECTED WEEKLY TO KEEP INR 2-3             warfarin (COUMADIN) 5 MG tablet  Take 2.5 mg by mouth Daily.                 Discharge Diet:         Dietary Orders            Start     Ordered    08/14/17 1143  Diet Dysphagia; III - Pureed With Some Mashed; Thin; Cardiac  Diet Effective Now     Question Answer Comment   Diet Texture / Consistency Dysphagia    Select Dysphagia level: III - Pureed With Some Mashed    Fluid Consistency: Thin    Common Modifiers Cardiac        08/14/17 1142          Activity at Discharge:    Activity Instructions     Advance as tolerated.            as tolerated    Discharge disposition: Nursing Home    Discharge Instructions and Follow ups:    Follow-up Information     Follow up with [facility] .    Specialties:  [facility], [facility]iatry    Contact information:    [address] [facility] [address] [phone]        Follow up with Narciso Ma MD. Schedule an appointment as soon as possible for a visit in 2 week(s).    Specialty:  Family Medicine     Contact information:    [address]  [facility] [address] [phone]          No future appointments.     Medication Reconciliation: Please see electronically completed Med Rec.    Total time spent discharging patient including evaluation, medication reconciliation, arranging follow up, and post hospitalization instructions and education total time exceeds 30 minutes.     Junie Hartmann MD  08/15/17  3:28 PM    EMR Dragon/Transcription disclaimer:   Much of this encounter note is an electronic transcription/translation of spoken language to printed text. The electronic translation of spoken language may permit erroneous, or at times, nonsensical words or phrases to be inadvertently transcribed; Although I have reviewed the note for such errors, some may still exist.

## 2017-08-15 NOTE — PLAN OF CARE
Problem: Patient Care Overview (Adult)  Goal: Plan of Care Review  Outcome: Ongoing (interventions implemented as appropriate)    08/14/17 1935 08/15/17 0251   Coping/Psychosocial Response Interventions   Plan Of Care Reviewed With patient --    Outcome Evaluation   Outcome Summary/Follow up Plan --  Patient resting comfortably in room. Patient continues to become anxious with brief changes. AFib. Continue Abx,. Monitor labs, vitals.   Patient Care Overview   Progress --  no change       Goal: Discharge Needs Assessment  Outcome: Ongoing (interventions implemented as appropriate)    Problem: Sepsis (Adult)  Goal: Signs and Symptoms of Listed Potential Problems Will be Absent or Manageable (Sepsis)  Outcome: Ongoing (interventions implemented as appropriate)    Problem: Skin Integrity Impairment, Risk/Actual (Adult)  Goal: Identify Related Risk Factors and Signs and Symptoms  Outcome: Ongoing (interventions implemented as appropriate)  Goal: Skin Integrity/Wound Healing  Outcome: Ongoing (interventions implemented as appropriate)    Problem: Fall Risk (Adult)  Goal: Identify Related Risk Factors and Signs and Symptoms  Outcome: Ongoing (interventions implemented as appropriate)  Goal: Absence of Falls  Outcome: Ongoing (interventions implemented as appropriate)

## 2017-08-15 NOTE — PROGRESS NOTES
LOS: 3 days   Patient Care Team:  Narciso Ma MD as PCP - General (Family Medicine)    Chief Complaint:   f/u a fib and no VT.     Interval History:     No cp, no dyspnea, no nausea, no dizziness.  No further VT.       Echo 8/14/17  · Left ventricular systolic function is normal. Calculated EF = 59.6%. Estimated EF was in agreement with the calculated EF. Normal left ventricular cavity size noted. All left ventricular wall segments contract normally. Left ventricular wall thickness is consistent with mild-to-moderate concentric hypertrophy. Left ventricular diastolic was unable to be assessed. Elevated left atrial pressure.    Objective   Vital Signs  Temp:  [97.5 °F (36.4 °C)-98.5 °F (36.9 °C)] 97.5 °F (36.4 °C)  Heart Rate:  [67-90] 84  Resp:  [16-18] 16  BP: (133-159)/(62-92) 139/92    Intake/Output Summary (Last 24 hours) at 08/15/17 0847  Last data filed at 08/14/17 1300   Gross per 24 hour   Intake              120 ml   Output                0 ml   Net              120 ml       Comfortable NAD  Neck supple, no JVD or thyromegaly appreciated  S1/S2 irregular, rate controlled.  no m/r/g  Lungs CTA B, normal effort  Abdomen S/NT/ND (+) BS, no HSM appreciated  Extremities warm, no clubbing, cyanosis, or edema  No visible or palpable skin lesions  A/Ox4, mood and affect appropriate    Results Review:        Results from last 7 days  Lab Units 08/14/17  0234 08/13/17  0601 08/12/17  1144   SODIUM mmol/L 142 147* 146*   POTASSIUM mmol/L 3.6 3.6 4.3   CHLORIDE mmol/L 111* 116* 110*   CO2 mmol/L 19.4* 19.0* 22.9   BUN mg/dL 22 27* 36*   CREATININE mg/dL 1.28* 1.36* 1.92*   GLUCOSE mg/dL 109* 95 158*   CALCIUM mg/dL 9.4 8.7 10.0       Results from last 7 days  Lab Units 08/14/17  0234 08/13/17  0601 08/12/17  1144   TROPONIN T ng/mL 0.025 0.019 0.024       Results from last 7 days  Lab Units 08/13/17  0601 08/12/17  1144   WBC 10*3/mm3 4.33* 7.05   HEMOGLOBIN g/dL 8.0* 9.2*   HEMATOCRIT % 28.0* 33.1*   PLATELETS  10*3/mm3 151 181       Results from last 7 days  Lab Units 08/14/17  0234 08/12/17  1144   INR  2.57* 2.34*           Results from last 7 days  Lab Units 08/14/17  0234   MAGNESIUM mg/dL 1.7           I reviewed the patient's new clinical results.  I personally viewed and interpreted the patient's EKG/Telemetry data        Medication Review:     levoFLOXacin 750 mg Intravenous Q24H   metoprolol tartrate 12.5 mg Oral Q12H   warfarin 2.5 mg Oral Daily         dextrose 5 % and sodium chloride 0.45 % 100 mL/hr Last Rate: 100 mL/hr (08/14/17 1128)       Assessment/Plan     Active Problems:    Severe sepsis    Acute on chronic renal insufficiency    Hypernatremia    Dehydration    Atrial fibrillation    Nonsustained ventricular tachycardia    Dementia    1.  NSVT -- he was known to have this in 2014 as well. It occurs predominantly during sleep.  I think it's reasonable to recheck an echo to see if there has been much change in the last 3 years, but he is not a candidate for any further ischemic workup after that.  His EKG is unchanged compared to prior studies.  His BP is improved. He has had no further VT.      2.  Questionable chest pain -- quite simply, a history cannot be obtained from him.  Again, his EKG has no ischemic changes. His Tn is negative.      3.  Chronic atrial fibrillation -- rate controlled.  He's on warfarin with a therapeutic INR but is anemic and has heme positive stools.     4.  ADRIEL on CKD -- improved with IVF     5.  Dehydration/hypernatremia -- improved with IVF     6.  Dementia     7.  Sepsis, possible diverticulitis    8. Anemia, worsening.     Await labs, increase metoprolol tartrate.      Abigail Jack MD  08/15/17  8:47 AM

## 2017-08-15 NOTE — NURSING NOTE
Pt. Refusing anything PO including meals and meds. Spitting everything out that goes in his mouth.

## 2017-08-16 NOTE — PROGRESS NOTES
Continued Stay Note  [facility]     Patient Name: Darrian Davis  MRN: [id number]  Today's Date: 8/16/2017    Admit Date: 8/12/2017          Discharge Plan       08/16/17 0718    Case Management/Social Work Plan    Plan [facility]    Patient/Family In Agreement With Plan yes    Final Note    Final Note Patient was DC'd to [facility].              Discharge Codes       08/16/17 0718    Discharge Codes    Discharge Codes 65  Discharged/transferred to a psychiatric hospital or psyciatric distinct part unit of hospital        Expected Discharge Date and Time     Expected Discharge Date Expected Discharge Time    Aug 15, 2017             Becky S. [place], RN

## 2017-09-09 NOTE — ED PROVIDER NOTES
EMERGENCY DEPARTMENT ENCOUNTER    CHIEF COMPLAINT  Chief Complaint: aggressive behavior  History given by: EMS  History limited by: nothing  Room Number: 13/13  PMD: Narciso Ma MD      HPI:  Pt is a 77 y.o. male who presents complaining of aggressive behavior. Pt has no medical complaints. Per EMS, pt came from [facility], he arrived last night. When nurses went to dress the pt this morning, he refused to take his medication and became combative.    Duration: since this morning  Onset: gradual  Timing: episodic  Radiation: none  Quality: aggressive behavior  Intensity/Severity: moderate  Progression: resolved  Associated Symptoms: none  Aggravating Factors: none  Alleviating Factors: none  Previous Episodes: Pt has been seen in the ED multiple times for dementia with behavioral disturbances  Treatment before arrival: none    PAST MEDICAL HISTORY  Active Ambulatory Problems     Diagnosis Date Noted   • Severe sepsis 08/12/2017   • Acute on chronic renal insufficiency 08/14/2017   • Hypernatremia 08/14/2017   • Dehydration 08/14/2017   • Atrial fibrillation    • Nonsustained ventricular tachycardia 08/14/2017   • Dementia 08/14/2017     Resolved Ambulatory Problems     Diagnosis Date Noted   • No Resolved Ambulatory Problems     Past Medical History:   Diagnosis Date   • Anxiety    • Arthritis    • Atrial fibrillation    • CAD (coronary artery disease)    • Confusion    • COPD (chronic obstructive pulmonary disease)    • Dementia    • Depression    • DM (diabetes mellitus), type 2    • DVT (deep venous thrombosis)    • Hypertension    • Kidney stone    • Myocardial infarction    • PE (pulmonary thromboembolism)    • Prostate cancer    • Skin cancer    • Stroke        PAST SURGICAL HISTORY  Past Surgical History:   Procedure Laterality Date   • BACK SURGERY     • COLONOSCOPY     • HIP SURGERY     • PROSTATE SURGERY         FAMILY HISTORY  Family History   Problem Relation Age of Onset    • Family history unknown: Yes       SOCIAL HISTORY  Social History     Social History   • Marital status: Single     Spouse name: N/A   • Number of children: N/A   • Years of education: N/A     Occupational History   • Not on file.     Social History Main Topics   • Smoking status: Former Smoker   • Smokeless tobacco: Not on file   • Alcohol use No   • Drug use: No   • Sexual activity: Defer     Other Topics Concern   • Not on file     Social History Narrative   • No narrative on file       ALLERGIES  Bee venom; Iodine; and Propoxyphene    REVIEW OF SYSTEMS  Review of Systems   Unable to perform ROS: Dementia       PHYSICAL EXAM  ED Triage Vitals   Temp Heart Rate Resp BP SpO2   09/09/17 1044 09/09/17 1044 09/09/17 1044 09/09/17 1044 09/09/17 1044   98.1 °F (36.7 °C) 89 15 140/80 94 %      Temp src Heart Rate Source Patient Position BP Location FiO2 (%)   09/09/17 1044 09/09/17 1044 09/09/17 1044 09/09/17 1044 --   Oral Monitor Lying Right arm        Physical Exam   Constitutional: He is well-developed, well-nourished, and in no distress.   HENT:   Head: Normocephalic and atraumatic.   No trauma to head   Eyes: EOM are normal. Pupils are equal, round, and reactive to light.   Neck: Normal range of motion. Neck supple.   Cardiovascular: Normal rate, regular rhythm and normal heart sounds.    Pulmonary/Chest: Effort normal and breath sounds normal. No respiratory distress.   Abdominal: Soft. There is no tenderness. There is no rebound and no guarding.   Musculoskeletal: Normal range of motion. He exhibits no edema.   Neurological: He is alert. He has normal sensation, normal strength and intact cranial nerves.   Oriented to person   Skin: Skin is warm and dry.   Scab on right hand and right middle finger  Small abrasion on back of left hand, scab on left wrist  Dry scaly rash on scalp, both hands, and arms   Psychiatric: Mood and affect normal.   Pleasantly confused, cooperative   Nursing note and vitals  reviewed.      LAB RESULTS  Lab Results (last 24 hours)     Procedure Component Value Units Date/Time    CBC & Differential [[id number]] Collected:  09/09/17 1132    Specimen:  Blood Updated:  09/09/17 1147    Narrative:       The following orders were created for panel order CBC & Differential.  Procedure                               Abnormality         Status                     ---------                               -----------         ------                     CBC Auto Differential[[id number]]        Abnormal            Final result                 Please view results for these tests on the individual orders.    Comprehensive Metabolic Panel [[id number]]  (Abnormal) Collected:  09/09/17 1132    Specimen:  Blood Updated:  09/09/17 1205     Glucose 111 (H) mg/dL      BUN 22 mg/dL      Creatinine 1.18 mg/dL      Sodium 142 mmol/L      Potassium 5.4 (H) mmol/L      Chloride 109 (H) mmol/L      CO2 25.3 mmol/L      Calcium 10.1 mg/dL      Total Protein 6.4 g/dL      Albumin 3.00 (L) g/dL      ALT (SGPT) 10 U/L      AST (SGOT) 11 U/L      Alkaline Phosphatase 67 U/L      Total Bilirubin 0.2 mg/dL      eGFR Non African Amer 60 (L) mL/min/1.73      Globulin 3.4 gm/dL      A/G Ratio 0.9 g/dL      BUN/Creatinine Ratio 18.6     Anion Gap 7.7 mmol/L     Narrative:       The MDRD GFR formula is only valid for adults with stable renal function between ages 18 and 70.    Protime-INR [[id number]]  (Abnormal) Collected:  09/09/17 1132    Specimen:  Blood Updated:  09/09/17 1155     Protime 23.7 (H) Seconds      INR 2.20 (H)    CBC Auto Differential [[id number]]  (Abnormal) Collected:  09/09/17 1132    Specimen:  Blood Updated:  09/09/17 1147     WBC 5.26 10*3/mm3      RBC 3.62 (L) 10*6/mm3      Hemoglobin 8.3 (L) g/dL      Hematocrit 28.0 (L) %      MCV 77.3 (L) fL      MCH 22.9 (L) pg      MCHC 29.6 (L) g/dL      RDW 18.8 (H) %      RDW-SD 53.7 fl      MPV 8.8 fL      Platelets 237 10*3/mm3      Neutrophil % 57.6 %       Lymphocyte % 31.7 %      Monocyte % 8.2 %      Eosinophil % 1.3 %      Basophil % 0.8 %      Immature Grans % 0.4 %      Neutrophils, Absolute 3.03 10*3/mm3      Lymphocytes, Absolute 1.67 10*3/mm3      Monocytes, Absolute 0.43 10*3/mm3      Eosinophils, Absolute 0.07 10*3/mm3      Basophils, Absolute 0.04 10*3/mm3      Immature Grans, Absolute 0.02 10*3/mm3     Valproic Acid Level, Total [[id number]]  (Abnormal) Collected:  09/09/17 1132    Specimen:  Blood Updated:  09/09/17 1203     Valproic Acid 8.0 (L) mcg/mL     Urinalysis With / Culture If Indicated [[id number]]  (Abnormal) Collected:  09/09/17 1258    Specimen:  Urine from Urine, Catheter Updated:  09/09/17 1324     Color, UA Yellow     Appearance, UA Cloudy (A)     pH, UA 7.5     Specific Gravity, UA 1.013     Glucose, UA Negative     Ketones, UA Negative     Bilirubin, UA Negative     Blood, UA Negative     Protein, UA Trace (A)     Leuk Esterase, UA Moderate (2+) (A)     Nitrite, UA Negative     Urobilinogen, UA 0.2 E.U./dL    Urinalysis, Microscopic Only [[id number]]  (Abnormal) Collected:  09/09/17 1258    Specimen:  Urine from Urine, Catheter Updated:  09/09/17 1354     RBC, UA 0-2 /HPF      WBC, UA 13-20 (A) /HPF      Bacteria, UA 1+ (A) /HPF      Squamous Epithelial Cells, UA 3-6 (A) /HPF      Hyaline Casts, UA 7-12 /LPF      Methodology Manual Light Microscopy    Urine Culture [[id number]] Collected:  09/09/17 1258    Specimen:  Urine from Urine, Catheter Updated:  09/09/17 1322          I ordered the above labs and reviewed the results        PROCEDURES  Procedures      PROGRESS AND CONSULTS  ED Course   Value Comment By Time   Hemoglobin: (!) 8.3 stable Aleksandar Contreras MD 09/09 1231     1103: Ordered labs for further evaluation. Ordered psych evaluation.  1200: Pt has been agitated and combative with nursing staff in the ED.  1356: Ordered rocephin for UTI.  1447: Pt continues to become intermittently agitated and combative with nurses. Pt refused  to keep his arm straight for IV rocephin and had to be put in an arm board. Pt has been evaluated by access who are working on disposing the pt.  1540: Pt's care was turned over to Dr Gonsalez. [facility] is working on a disposition for the patient.  MEDICAL DECISION MAKING  Results were reviewed/discussed with the patient and they were also made aware of online access. Pt also made aware that some labs, such as cultures, will not be resulted during ER visit and follow up with PMD is necessary.     MDM  Number of Diagnoses or Management Options  Dementia with behavioral disturbance, unspecified dementia type:   Urinary tract infection without hematuria, site unspecified:   Diagnosis management comments: Patient was sent to the ER for further evaluation of aggressive behavior.  Patient has numerous prior visits here for the same.  He was recently admitted to the [facility].  He does have a history of dementia.  Patient was found to have a mild UTI.  He was given IV Rocephin.  He was also chronically anemic but his hemoglobin was stable.  Patient was evaluated by access and his disposition is still pending at this time.       Amount and/or Complexity of Data Reviewed  Clinical lab tests: ordered and reviewed (Valproic acid: 8.0, Protime INR: 23.7, INR: 2.20, WBC, UA: 13-20, bacteira, UA: 1+)  Decide to obtain previous medical records or to obtain history from someone other than the patient: yes  Review and summarize past medical records: yes (Multiple ER visits here for dementia with behavioral disturbance. Last admitted 8/12-8/15/17 for acute kidney injury, hypotension and dehydration. Pt had a head CT on 8/12/17 which showed nothing acute)  Discuss the patient with other providers: yes (access)    Patient Progress  Patient progress: stable         DIAGNOSIS  Final diagnoses:   Dementia with behavioral disturbance, unspecified dementia type   Urinary tract infection without hematuria, site unspecified        DISPOSITION  pending    Latest Documented Vital Signs:  As of 2:55 PM  BP- 143/94 HR- 88 Temp- 98.1 °F (36.7 °C) (Oral) O2 sat- 98%    --  Documentation assistance provided by gm Hughes for Dr. Contreras.  Information recorded by the scribe was done at my direction and has been verified and validated by me.           Radha Hughes  09/09/17 1526       Aleksandar Contreras MD  09/09/17 5121
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
## 2017-09-09 NOTE — ED NOTES
Pt was sent here from [facility]. Staff reports he has been aggressive. baseline unknown. Family in route to hospital to assist.      Virgie Pizano RN  09/09/17 9097
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
## 2017-10-08 PROBLEM — N18.30 CKD (CHRONIC KIDNEY DISEASE), STAGE III (HCC): Status: ACTIVE | Noted: 2017-01-01

## 2017-10-08 PROBLEM — Z79.01 ANTICOAGULATED ON COUMADIN: Status: ACTIVE | Noted: 2017-01-01

## 2017-10-08 PROBLEM — E11.9 DM (DIABETES MELLITUS), TYPE 2 (HCC): Status: ACTIVE | Noted: 2017-01-01

## 2017-10-08 PROBLEM — D64.9 ANEMIA: Status: ACTIVE | Noted: 2017-01-01

## 2017-10-08 PROBLEM — Z86.718 HISTORY OF DVT (DEEP VEIN THROMBOSIS): Status: ACTIVE | Noted: 2017-01-01

## 2017-10-08 PROBLEM — E87.5 HYPERKALEMIA: Status: ACTIVE | Noted: 2017-01-01

## 2017-10-08 PROBLEM — J44.9 COPD (CHRONIC OBSTRUCTIVE PULMONARY DISEASE) (HCC): Status: ACTIVE | Noted: 2017-01-01

## 2017-10-08 PROBLEM — D50.9 IRON DEFICIENCY ANEMIA: Status: ACTIVE | Noted: 2017-01-01

## 2017-10-08 PROBLEM — I10 HYPERTENSION: Status: ACTIVE | Noted: 2017-01-01

## 2017-10-08 PROBLEM — S72.001A CLOSED FRACTURE OF RIGHT HIP (HCC): Status: ACTIVE | Noted: 2017-01-01

## 2017-10-08 PROBLEM — Z66 DNR (DO NOT RESUSCITATE): Status: ACTIVE | Noted: 2017-01-01

## 2017-10-08 PROBLEM — I48.91 ATRIAL FIBRILLATION (HCC): Status: ACTIVE | Noted: 2017-01-01

## 2017-10-08 PROBLEM — I25.10 CAD (CORONARY ARTERY DISEASE): Status: ACTIVE | Noted: 2017-01-01

## 2017-10-08 PROBLEM — I48.91 ATRIAL FIBRILLATION (HCC): Status: RESOLVED | Noted: 2017-01-01 | Resolved: 2017-01-01

## 2017-10-08 PROBLEM — F03.90 DEMENTIA (HCC): Status: ACTIVE | Noted: 2017-01-01

## 2017-10-08 NOTE — ED NOTES
"Pt from [facility]. Baseline dementia. Pt was \"trying to sit in office chair and fell.\" C/O of right shoulder and right hip and right hand pain. Not on blood thinners, no LOC, did not hit his head.      Nivia Courtney RN  10/08/17 0536    "

## 2017-10-08 NOTE — ED NOTES
Attempted to call report to [facility], they requested this RN to call back due to RN still receiving morning report. This RN will call back in 5 minutes.     Kimmy Joy RN  10/08/17 0715

## 2017-10-08 NOTE — H&P
HISTORY AND PHYSICAL   [facility]        Patient Identification:  Name: Darrian Davis  Age: 77 y.o.  Sex: male  :  1940  MRN: [id number]                     Primary Care Physician: Narciso Ma MD    Chief Complaint:  Hip pain.     History of Present Illness:   Pleasant 77-year-old gentleman with advanced Alzheimer's dementia transferred from a skilled nurse facility after sustaining a fall.  The patient himself has no recollections of the events surrounding the fall.  His complaint initially to me was right hand pain.  Only after attempting to move his right leg and he realize that he also had right hip pain.      Past Medical History:  Past Medical History:   Diagnosis Date   • Anxiety    • Arthritis    • Atrial fibrillation    • CAD (coronary artery disease)    • Confusion    • COPD (chronic obstructive pulmonary disease)    • Dementia    • Depression    • DM (diabetes mellitus), type 2    • DVT (deep venous thrombosis)    • Hypertension    • Kidney stone    • Myocardial infarction    • PE (pulmonary thromboembolism)    • Prostate cancer    • Skin cancer    • Stroke      Past Surgical History:  Past Surgical History:   Procedure Laterality Date   • BACK SURGERY     • COLONOSCOPY     • HIP SURGERY     • PROSTATE SURGERY        Home Meds:  Prescriptions Prior to Admission   Medication Sig Dispense Refill Last Dose   • aspirin 81 MG tablet Take 81 mg by mouth Daily.   2017 at 0800   • Divalproex Sodium (DEPAKOTE SPRINKLE) 125 MG capsule Take 250 mg by mouth 3 (Three) Times a Day.   2017 at 0800   • ferrous sulfate (IRON SUPPLEMENT) 325 (65 FE) MG tablet Take 1 tablet by mouth 2 (Two) Times a Day. 60 tablet 2 2017 at 0800   • gabapentin (NEURONTIN) 300 MG capsule Take 1 capsule by mouth 3 (Three) Times a Day. 90 capsule 0 2017 at 0800   • metoprolol tartrate (LOPRESSOR) 25 MG tablet Take 1 tablet by mouth 2 (Two) Times a Day. 60 tablet 0 2017 at 0800   • Multiple  Vitamins-Minerals (MULTIVITAMIN ADULT PO) Take  by mouth Daily.   2017 at 0800   • nitroglycerin (NITROSTAT) 0.4 MG SL tablet Place 0.4 mg under the tongue Every 5 (Five) Minutes As Needed for Chest Pain. Take no more than 3 doses in 15 minutes.   Unknown at Unknown time   • Omega-3 Fatty Acids (FISH OIL) 1000 MG capsule capsule Take 1,000 mg by mouth Daily With Breakfast.   2017 at 0800   • traZODone (DESYREL) 100 MG tablet Take 100 mg by mouth Every Night.   2017 at 2000   • cephalexin (KEFLEX) 500 MG capsule Take 1 capsule by mouth 3 (Three) Times a Day. 15 capsule 0    • diphenoxylate-atropine (LOMOTIL) 2.5-0.025 MG per tablet Take 1 tablet by mouth Daily As Needed for Diarrhea.      • levoFLOXacin (LEVAQUIN) 750 MG tablet Take 1 tablet by mouth Daily. 3 tablet 0 2017 at 0800   • warfarin (COUMADIN) 1 MG tablet TAKE ONE TABLET BY MOUTH ONCE DAILY FOR 30 DAYS OR AS DIRECTED WEEKLY TO KEEP INR 2-3 30 tablet 0    • warfarin (COUMADIN) 5 MG tablet Take 2.5 mg by mouth Daily.   2017 at 0800       Allergies:  Allergies   Allergen Reactions   • Bee Venom    • Iodine Hives   • Latex    • Propoxyphene Hives     Immunizations:    There is no immunization history on file for this patient.  Social History:   Social History     Social History Narrative     Social History   Substance Use Topics   • Smoking status: Former Smoker   • Smokeless tobacco: Not on file   • Alcohol use No     Family History:  Family History   Problem Relation Age of Onset   • Family history unknown: Yes        Review of Systems  Review of Systems   Unable to perform ROS: Dementia       Objective:  tMax 24 hrs: Temp (24hrs), Av.1 °F (36.7 °C), Min:97.4 °F (36.3 °C), Max:98.7 °F (37.1 °C)    Vitals Ranges:   Temp:  [97.4 °F (36.3 °C)-98.7 °F (37.1 °C)] 97.4 °F (36.3 °C)  Heart Rate:  [] 100  Resp:  [20-22] 22  BP: (137-158)/() 158/88      Exam:  Physical Exam   Constitutional: He appears well-developed and  well-nourished. No distress.   HENT:   Head: Normocephalic and atraumatic.   Right Ear: External ear normal.   Left Ear: External ear normal.   Nose: Nose normal.   Mouth/Throat: Oropharynx is clear and moist.   Eyes: Conjunctivae and EOM are normal. Right eye exhibits no discharge. Left eye exhibits no discharge. No scleral icterus.   Small conjunctival hemorrhage latter aspect of right eye.    Neck: No tracheal deviation present. No thyromegaly present.   Cardiovascular: Normal rate.  Exam reveals no gallop and no friction rub.    Murmur (1/6 systolic) heard.  Irregular rhythm.  Pedal pulses are trace bilaterally.  Upper extremity pulses just shy 2+ bilaterally.   Pulmonary/Chest: Effort normal and breath sounds normal. No stridor. No respiratory distress. He exhibits no tenderness.   Abdominal: Soft. Bowel sounds are normal. He exhibits no distension and no mass. There is no tenderness. There is no rebound and no guarding.   Musculoskeletal: He exhibits no edema.   Right lower extremity is foreshortened and everted.   Neurological: He is alert.   Oriented to person only.  Conversant and cooperative   Skin: Skin is warm and dry. He is not diaphoretic.   Psychiatric: He has a normal mood and affect. His behavior is normal.   Please see neurologic exam.       Data Review:  All labs and radiology reviewed.    Assessment:  Principal Problem:    Closed fracture of right hip:  Orthopedic consult.  Active Problems:    CAD (coronary artery disease):  No complaints of chest pain.  Cardiology preop evaluation.  Check EKG, troponin level.    Hypertension:  Continue medications.    COPD (chronic obstructive pulmonary disease):  Is in a stable.  Monitor.    DM (diabetes mellitus), type 2:  Monitor.  Provide sliding-scale insulin.    Dementia    Atrial fibrillation:  Rate controlled, on chronic Coumadin.  Preop cardiology evaluation.    History of DVT (deep vein thrombosis):  Monitor closely perioperatively.    DNR (do not  resuscitate)    Anticoagulated on Coumadin:  Keep Coumadin on hold for the time being perioperatively.    Iron deficiency anemia:  Recheck iron panel. (Past panels have revealed severe iron deficiency)  May benefit from IV iron sucrose.    CKD (chronic kidney disease), stage III:  Stable.    Hyperkalemia:  Mild.  Monitor.      Plan:  Please see above.    Tristian Barrios MD  10/8/2017  9:50 AM    EMR Dragon/Transcription disclaimer:   Much of this encounter note is an electronic transcription/translation of spoken language to printed text. The electronic translation of spoken language may permit erroneous, or at times, nonsensical words or phrases to be inadvertently transcribed; Although I have reviewed the note for such errors, some may still exist.

## 2017-10-08 NOTE — CONSULTS
Patient is a 77-year-old man who is a nursing home resident who was brought to the emergency room after a fall.  The patient initially had complaints of pain from his right hand but it was noted that he had pain with motion of his right lower extremity and hip.  X-ray examination revealed a nondisplaced fracture of his right femoral neck.  He was admitted to the medical service and orthopedic consultation was placed.    PMH-multiple medical problems including coronary artery disease, COPD, atrial fibrillation, dementia, history of DVT or anticoagulation.    PE- patient's lying comfortably in bed.  He is confused and is a very poor historian.  He does not remember the events of his injury and is not oriented.  The patient's right lower extremity is held in a flexed and internally rotated position.  There is no significant bruising over his greater trochanter or right knee.    XR- mildly displaced and slightly impacted fracture of the right femoral neck.  DX  right femoral neck fracture  PLAN- the patient would benefit from hemiarthroplasty when medically stable.  One of my partners may perform the actual surgery and Dr. Mosquera has been asked to see the patient.  If medically cleared may be able to perform the procedure tomorrow.  We'll make him nothing by mouth after midnight tonight.

## 2017-10-08 NOTE — ED PROVIDER NOTES
EMERGENCY DEPARTMENT ENCOUNTER    CHIEF COMPLAINT  Chief Complaint: fall  History given by: pt  History limited by: nothing  Room Number: 16/16  PMD: Narciso Ma MD      HPI:  Pt is a 77 y.o. male who presents complaining of fall which occurred PTA. Per pt, LOC occurred for about 6 minutes. Pt c/o right fifth finger pain, right shoulder pain and bilateral hip pain. Pt is no longer on coumadin.    Duration:  PTA  Onset: gradual  Timing: constant  Location: right 5th finger pain  Radiation: none  Quality: pain  Intensity/Severity: moderate  Progression: unchanged  Associated Symptoms: bilateral hip pain  Aggravating Factors: none  Alleviating Factors: none  Previous Episodes: none  Treatment before arrival: none    PAST MEDICAL HISTORY  Active Ambulatory Problems     Diagnosis Date Noted   • Severe sepsis 08/12/2017   • Acute on chronic renal insufficiency 08/14/2017   • Hypernatremia 08/14/2017   • Dehydration 08/14/2017   • Atrial fibrillation    • Nonsustained ventricular tachycardia 08/14/2017   • Dementia 08/14/2017     Resolved Ambulatory Problems     Diagnosis Date Noted   • No Resolved Ambulatory Problems     Past Medical History:   Diagnosis Date   • Anxiety    • Arthritis    • Atrial fibrillation    • CAD (coronary artery disease)    • Confusion    • COPD (chronic obstructive pulmonary disease)    • Dementia    • Depression    • DM (diabetes mellitus), type 2    • DVT (deep venous thrombosis)    • Hypertension    • Kidney stone    • Myocardial infarction    • PE (pulmonary thromboembolism)    • Prostate cancer    • Skin cancer    • Stroke        PAST SURGICAL HISTORY  Past Surgical History:   Procedure Laterality Date   • BACK SURGERY     • COLONOSCOPY     • HIP SURGERY     • PROSTATE SURGERY         FAMILY HISTORY  Family History   Problem Relation Age of Onset   • Family history unknown: Yes       SOCIAL HISTORY  Social History     Social History   • Marital status: Single     Spouse name: N/A   •  Number of children: N/A   • Years of education: N/A     Occupational History   • Not on file.     Social History Main Topics   • Smoking status: Former Smoker   • Smokeless tobacco: Not on file   • Alcohol use No   • Drug use: No   • Sexual activity: Defer     Other Topics Concern   • Not on file     Social History Narrative       ALLERGIES  Bee venom; Iodine; Latex; and Propoxyphene    REVIEW OF SYSTEMS  Review of Systems   Constitutional: Negative for activity change, appetite change and fever.   HENT: Negative for congestion and sore throat.    Eyes: Negative.    Respiratory: Negative for cough and shortness of breath.    Cardiovascular: Negative for chest pain and leg swelling.   Gastrointestinal: Negative for abdominal pain, diarrhea and vomiting.   Endocrine: Negative.    Genitourinary: Negative for decreased urine volume and dysuria.   Musculoskeletal: Negative for neck pain.        Bilateral hip pain, pain to right fifth finger pain, right shoulder pain   Skin: Negative for rash and wound.   Allergic/Immunologic: Negative.    Neurological: Negative for weakness, numbness and headaches.   Hematological: Negative.    Psychiatric/Behavioral: Negative.    All other systems reviewed and are negative.      PHYSICAL EXAM  ED Triage Vitals   Temp Heart Rate Resp BP SpO2   10/08/17 0537 10/08/17 0537 10/08/17 0537 10/08/17 0537 10/08/17 0537   98.7 °F (37.1 °C) 77 20 137/84 95 %      Temp src Heart Rate Source Patient Position BP Location FiO2 (%)   10/08/17 0537 10/08/17 0537 10/08/17 0537 10/08/17 0537 --   Tympanic Monitor Sitting Right arm        Physical Exam   Constitutional: He is well-developed, well-nourished, and in no distress.   HENT:   Head: Normocephalic and atraumatic.   Eyes: EOM are normal. Pupils are equal, round, and reactive to light.   Neck: Normal range of motion. Neck supple.   Cardiovascular: Normal rate, regular rhythm and normal heart sounds.    Pulmonary/Chest: Effort normal and breath  sounds normal. No respiratory distress.   No chest wall pain or deformity   Abdominal: Soft. There is no tenderness. There is no rebound and no guarding.   Musculoskeletal: Normal range of motion. He exhibits no edema.   Neurological: He is alert. He has normal sensation and normal strength.   Oriented to person and place, not to time   Skin: Skin is warm and dry.   Superficial abrasion to interior shin of left leg, superficial hematoma to 5th finger pad, steri strips to right 5th MCP joint that appeared to be there for some time.   Psychiatric: Mood and affect normal.   Nursing note and vitals reviewed.        RADIOLOGY  XR Chest 1 View    (Results Pending)   XR Pelvis 1 or 2 View    (Results Pending)   CT Head Without Contrast    (Results Pending)   CT Cervical Spine Without Contrast    (Results Pending)        I ordered the above noted radiological studies. Interpreted by radiologist.  Reviewed by me in PACS.       PROCEDURES  Procedures      PROGRESS AND CONSULTS  ED Course     0609: Ordered CT C-spine, XR chest, XR pelvis, and CT head for further evaluation.  0650: Reviewd XR pelvis which showed a fracture to the subcapital right hip. Official review pending.  0651: Placed call to A and ortho.  0656: Discussed pt's case with Dr. Zamora (ortho) who agrees to consult.  0701: Discussed pt's case with Dr. Sierra (A) who agrees with plan to admit pt to a tele bed.    MEDICAL DECISION MAKING  Results were reviewed/discussed with the patient and they were also made aware of online access. Pt also made aware that some labs, such as cultures, will not be resulted during ER visit and follow up with PMD is necessary.     MDM  Number of Diagnoses or Management Options     Amount and/or Complexity of Data Reviewed  Tests in the radiology section of CPT®: ordered and reviewed (XR pelvis: showed right hip fracutre)  Decide to obtain previous medical records or to obtain history from someone other than the patient:  yes  Review and summarize past medical records: yes  Discuss the patient with other providers: yes (Dr. Zamora (ortho), Dr. Loco ([facility]))    Patient Progress  Patient progress: stable         DIAGNOSIS  Final diagnoses:   Closed fracture of right hip, initial encounter   Multiple contusions   Fall, initial encounter       DISPOSITION  ADMISSION    Discussed treatment plan and reason for admission with pt/family and admitting physician.  Pt/family voiced understanding of the plan for admission for further testing/treatment as needed.         Latest Documented Vital Signs:  As of 7:01 AM  BP- (!) 158/101 HR- 73 Temp- 98.7 °F (37.1 °C) (Tympanic) O2 sat- 91%    --  Documentation assistance provided by gm Hughes for Dr. Meng.  Information recorded by the scribe was done at my direction and has been verified and validated by me.     Radha Hughes  10/08/17 0703       Roel Meng MD  10/08/17 0708

## 2017-10-08 NOTE — CONSULTS
Date of Hospital Visit:10/08/17  Encounter Provider: Ceci Hopkins RN  Place of Service: [facility]  Patient Name: Darrian Davis  :1940  Referral Provider: Tristian Barrios MD    Chief complaint: fall    Consulted for: A fib, CAD, CHF patient in need of cardiac clearance for surgery    History of Present Illness: Mr. Davis is a 76 y/o patient of Dr. Patterson and previously Dr. Sue, who has a history of A fib-on coumadin, NSVT, CAD/MI in his 30s, DM, dementia, DVT, HTN, PE and CVA. He lives in a nursing home. Dr. Hoffmann saw him in August for chest pain and NSVT. An echo was done which showed an EF of 59%, mild to mod LVH and elevated LAP. His troponin was negative and there were no EKG changes. He was restarted on metoprolol tartrate and there has been no follow up.     He presented to the ED this morning after sustaining a fall. On arrival his bp was 137/84, HR 77. Hip XR showed a fracture of the right hip. He was admitted for treatment.    We have been asked to see for cardiac clearance given his cardiac history. He had a cardiac workup in May 2014 which included an echocardiogram that showed an EF of 55-60% with mild to moderate MR/TR, severe PAH with septal flattening of the interventricular septum and RVSP 57 mmHg. Lexiscan Cardiolite stress test showed an EF of 55% and no evidence of ischemia or infarction.  He had a Holter monitor which revealed underlying atrial fibrillation with frequent premature ventricular contractions,as well as 4 runs of nonsustained ventricular tachycardia, the longest of which was 10 beats. CXR shows mild vascular congestion and mild cardiomegaly. Admission labs are pending.     Previous testing:  Echo 17  · Left ventricular systolic function is normal. Calculated EF = 59.6%. Estimated EF was in agreement with the calculated EF. Normal left ventricular cavity size noted. All left ventricular wall segments contract normally.  Left ventricular wall thickness is consistent with mild-to-moderate concentric hypertrophy. Left ventricular diastolic was unable to be assessed. Elevated left atrial pressure.    Stress test 5/2014  SUMMARY:   1. Normal Lexiscan Cardiolite stress test.   2. No evidence of ischemia or infarction.   3. Normal wall motion.   4. Normal ejection fraction.   5. There is a small area of minimal fixed hypoperfusion in the base and  inferior wall due to diaphragmatic attenuation artifact.     Holter Monitor 5/2014  IMPRESSION:   1.  Abnormal Holter recording.   2.  Underlying atrial fibrillation with frequent premature ventricular  contractions.  3.  There were 4 runs of nonsustained ventricular tachycardia, the longest one  was 10 beats in duration at a rate of 185 beats per minute.       Past Medical History:   Diagnosis Date   • Anxiety    • Arthritis    • Atrial fibrillation    • CAD (coronary artery disease)    • Confusion    • COPD (chronic obstructive pulmonary disease)    • Dementia    • Depression    • DM (diabetes mellitus), type 2    • DVT (deep venous thrombosis)    • Hypertension    • Kidney stone    • Myocardial infarction    • PE (pulmonary thromboembolism)    • Prostate cancer    • Skin cancer    • Stroke        Past Surgical History:   Procedure Laterality Date   • BACK SURGERY     • COLONOSCOPY     • HIP SURGERY     • PROSTATE SURGERY         No current facility-administered medications on file prior to encounter.      Current Outpatient Prescriptions on File Prior to Encounter   Medication Sig Dispense Refill   • aspirin 81 MG tablet Take 81 mg by mouth Daily.     • Divalproex Sodium (DEPAKOTE SPRINKLE) 125 MG capsule Take 250 mg by mouth 3 (Three) Times a Day.     • ferrous sulfate (IRON SUPPLEMENT) 325 (65 FE) MG tablet Take 1 tablet by mouth 2 (Two) Times a Day. 60 tablet 2   • gabapentin (NEURONTIN) 300 MG capsule Take 1 capsule by mouth 3 (Three) Times a Day. 90 capsule 0   • metoprolol tartrate  (LOPRESSOR) 25 MG tablet Take 1 tablet by mouth 2 (Two) Times a Day. 60 tablet 0   • Multiple Vitamins-Minerals (MULTIVITAMIN ADULT PO) Take  by mouth Daily.     • nitroglycerin (NITROSTAT) 0.4 MG SL tablet Place 0.4 mg under the tongue Every 5 (Five) Minutes As Needed for Chest Pain. Take no more than 3 doses in 15 minutes.     • Omega-3 Fatty Acids (FISH OIL) 1000 MG capsule capsule Take 1,000 mg by mouth Daily With Breakfast.     • traZODone (DESYREL) 100 MG tablet Take 100 mg by mouth Every Night.     • cephalexin (KEFLEX) 500 MG capsule Take 1 capsule by mouth 3 (Three) Times a Day. 15 capsule 0   • diphenoxylate-atropine (LOMOTIL) 2.5-0.025 MG per tablet Take 1 tablet by mouth Daily As Needed for Diarrhea.     • levoFLOXacin (LEVAQUIN) 750 MG tablet Take 1 tablet by mouth Daily. 3 tablet 0   • warfarin (COUMADIN) 1 MG tablet TAKE ONE TABLET BY MOUTH ONCE DAILY FOR 30 DAYS OR AS DIRECTED WEEKLY TO KEEP INR 2-3 30 tablet 0   • warfarin (COUMADIN) 5 MG tablet Take 2.5 mg by mouth Daily.         Social History     Social History   • Marital status: Single     Spouse name: N/A   • Number of children: N/A   • Years of education: N/A     Occupational History   • Not on file.     Social History Main Topics   • Smoking status: Former Smoker   • Smokeless tobacco: Not on file   • Alcohol use No   • Drug use: No   • Sexual activity: Defer     Other Topics Concern   • Not on file     Social History Narrative       Family History   Problem Relation Age of Onset   • Family history unknown: Yes       REVIEW OF SYSTEMS:   All ROS was performed and is Negative except as outlined in HPI    Objective:     Vitals:    10/08/17 0649 10/08/17 0730 10/08/17 0732 10/08/17 0815   BP: (!) 158/101   158/88   BP Location: Left arm   Right arm   Patient Position: Lying   Lying   Pulse: 73   100   Resp:    22   Temp:    97.4 °F (36.3 °C)   TempSrc:    Oral   SpO2: 91% (!) 85% 93% 90%   Weight:       Height:         Body mass index is 26.5  "kg/(m^2).  Flowsheet Rows         First Filed Value    Admission Height  71\" (180.3 cm) Documented at 10/08/2017 0537    Admission Weight  190 lb (86.2 kg) Documented at 10/08/2017 0537          Physical Exam   Physical Exam   Constitutional: He appears well-developed and well-nourished. No distress.   HENT:   Head: Normocephalic.   Eyes: Conjunctivae are normal. Pupils are equal, round, and reactive to light. No scleral icterus.   Neck: Normal carotid pulses, no hepatojugular reflux and no JVD present. Carotid bruit is not present. No tracheal deviation, no edema and no erythema present. No thyromegaly present.   Cardiovascular: Normal rate, S1 normal, S2 normal, normal heart sounds and intact distal pulses.  An irregularly irregular rhythm present.  No extrasystoles are present. PMI is not displaced.  Exam reveals no gallop, no distant heart sounds and no friction rub.    No murmur heard.  Pulses:       Carotid pulses are 2+ on the right side, and 2+ on the left side.       Radial pulses are 2+ on the right side, and 2+ on the left side.        Femoral pulses are 2+ on the right side, and 2+ on the left side.       Dorsalis pedis pulses are 2+ on the right side, and 2+ on the left side.        Posterior tibial pulses are 2+ on the right side, and 2+ on the left side.   Pulmonary/Chest: Effort normal and breath sounds normal. No respiratory distress. He has no decreased breath sounds. He has no wheezes. He has no rhonchi. He has no rales. He exhibits no tenderness.   Abdominal: Soft. Bowel sounds are normal. He exhibits no distension and no mass. There is no hepatosplenomegaly. There is no tenderness. There is no rebound and no guarding.   Musculoskeletal: He exhibits no edema, tenderness or deformity.   Neurological: He is alert. He is disoriented (Just wants to get out of bed).   Skin: Skin is warm and dry. No rash noted. He is not diaphoretic. No cyanosis or erythema. No pallor. Nails show no clubbing. "   Psychiatric: He has a normal mood and affect. His speech is normal and behavior is normal. Judgment and thought content normal.       Lab Review:               CXR:  IMPRESSION:   1. Mild vascular congestion and suggestive mild interstitial edema  somewhat more prominent than on 08/12/2017. This finding is somewhat  exaggerated by suboptimal inspiration.  2. Mild cardiomegaly.      EKG:                                       I personally viewed and interpreted the patient's EKG/Telemetry data    Assessment:   1. Fractured hip.  He is Wilfredo's revised moderate risk.  None of those modifiable.  No further cardiovascular evaluation or treatment needed of note he did have a perfusion stress test in 2014 and was unremarkable an echocardiogram within the last 2 months were preserved left ventricular ejection fraction.  2.  History of coronary disease.  A remote infarct.  Preserved ejection fraction no angina continue home therapy.  3.  Atrial fibrillation persistent rate controlled on warfarin resumed postoperatively.  High risk for embolic event.  4.  History of hypertension follow blood pressure.  5.  History of nonsustained tachycardia tachycardia no further treatment required  6.  History of dementia.        Plan:

## 2017-10-08 NOTE — ED NOTES
Pt presents to ED via EMS after a fall at the nursing home. Pt has dementia and baseline confusion. Pt alert to self. Complains of pain in right shoulder, right hand, and right leg pain. Pt has generalized bruising, scabs, and scars to skin.        Laura Turner RN  10/08/17 0538

## 2017-10-09 NOTE — PLAN OF CARE
Problem: Skin Integrity Impairment, Risk/Actual (Adult)  Goal: Identify Related Risk Factors and Signs and Symptoms  Outcome: Outcome(s) achieved Date Met:  10/09/17    10/09/17 1540   Skin Integrity Impairment, Risk/Actual   Skin Integrity Impairment, Risk/Actual: Related Risk Factors traumatic injury

## 2017-10-09 NOTE — NURSING NOTE
Spoke with patient's daughter and RANDALL Kerr, she was upset because she wasn't notified that patient was sent in the hospital and she stated that she will come tomorrow morning around 0600 to see and talk to the Doctor before signing the Consent for Surgery and possible blood transfusion.

## 2017-10-09 NOTE — PROGRESS NOTES
"     LOS: 1 day   Primary Care Physician: Narciso Ma MD     Subjective   Daughter at bedside.  Patient answers \"yes\" to all my questions including \"are you having pain?\"  and \"is your pain controlled?\"    Vital Signs  Body mass index is 26.5 kg/(m^2).  Temp:  [97.5 °F (36.4 °C)-98.6 °F (37 °C)] 98.6 °F (37 °C)  Heart Rate:  [82-99] 99  Resp:  [20-24] 20  BP: (119-152)/(74-90) 140/88      Objective:  General Appearance:  In no acute distress (Elderly and frail.  Looks older than age).    Vital signs: (most recent): Blood pressure 140/88, pulse 99, temperature 98.6 °F (37 °C), temperature source Oral, resp. rate 20, height 71\" (180.3 cm), weight 190 lb (86.2 kg), SpO2 92 %.    Lungs:  There are decreased breath sounds.  No wheezes, rales or rhonchi.  (Anteriorly clear)  Heart: Normal rate.  Irregular rhythm.  No murmur.   Abdomen: Abdomen is soft and non-distended.  Bowel sounds are normal.   There is no abdominal tenderness.   There is no splenomegaly. There is no hepatomegaly.   Extremities: There is no dependent edema.  (Right leg is externally rotated and flexed at the hip and knee)  Neurological: Patient is alert.          Results Review:    I reviewed the patient's new clinical results.      Results from last 7 days  Lab Units 10/09/17  0352 10/08/17  1034   WBC 10*3/mm3 7.11 6.25   HEMOGLOBIN g/dL 9.2* 9.2*   PLATELETS 10*3/mm3 168 164       Results from last 7 days  Lab Units 10/09/17  0352 10/08/17  1034   SODIUM mmol/L 144 142   POTASSIUM mmol/L 4.6 4.1   CHLORIDE mmol/L 109* 108*   CO2 mmol/L 23.6 25.0   BUN mg/dL 19 16   CREATININE mg/dL 1.43* 1.24   CALCIUM mg/dL 10.1 10.1   GLUCOSE mg/dL 109* 111*       Results from last 7 days  Lab Units 10/09/17  0352   INR  1.31*     Hemoglobin A1C:  Lab Results   Component Value Date    HGBA1C 5.00 10/09/2017       Glucose Range:  Glucose   Date/Time Value Ref Range Status   10/09/2017 0606 109 70 - 130 mg/dL Final   10/08/2017 2108 146 (H) 70 - 130 mg/dL " Final   10/08/2017 1607 102 70 - 130 mg/dL Final   10/08/2017 1225 119 70 - 130 mg/dL Final       Lab Results   Component Value Date    QQEVSOCQ05 495 10/08/2017       Lab Results   Component Value Date    TSH 0.258 (L) 10/08/2017       Assessment & Plan      Medication Review: Yes    Active Hospital Problems (** Indicates Principal Problem)    Diagnosis Date Noted   • **Closed fracture of right hip [S72.001A] 10/08/2017   • CAD (coronary artery disease) [I25.10] 10/08/2017   • Hypertension [I10] 10/08/2017   • COPD (chronic obstructive pulmonary disease) [J44.9] 10/08/2017   • DM (diabetes mellitus), type 2 [E11.9] 10/08/2017   • Dementia [F03.90] 10/08/2017   • Atrial fibrillation [I48.91] 10/08/2017   • History of DVT (deep vein thrombosis) [Z86.718] 10/08/2017   • DNR (do not resuscitate) [Z66] 10/08/2017   • Anticoagulated on Coumadin [Z51.81, Z79.01] 10/08/2017   • Iron deficiency anemia [D50.9] 10/08/2017   • CKD (chronic kidney disease), stage III [N18.3] 10/08/2017   • Hyperkalemia [E87.5] 10/08/2017      Resolved Hospital Problems    Diagnosis Date Noted Date Resolved   No resolved problems to display.       Assessment/Plan  1.  Right femoral neck fracture.  Had increased but acceptable risk.  Seen by cardiology; echocardiogram in the last 2 months.  Hopefully can have surgery later today.  2.  Diabetes mellitus type 2.  A1c is low.  DC sliding scale insulin.  Follow Accu-Cheks.  3.  Chronic A. fib, rate controlled.  Restart Coumadin when okay with orthopedics.  4.  Chronic kidney disease stage III.  Creatinine noted.  IV fluids at 50 cc/h.  Nothing by mouth after breakfast today.  Recheck labs in a.m.  5.  History of iron deficiency anemia.  Hemoglobin noted.  Recheck in a.m.  6.  COPD, stable  7.  Advanced dementia    Merry Tucker MD  10/09/17  8:24 AM

## 2017-10-09 NOTE — PROGRESS NOTES
"    Patient Name: Darrian Davis  :1940  77 y.o.      Patient Care Team:  Narciso Ma MD as PCP - General (Family Medicine)    Chief Complaint: AFIB, CAD, Preop cards    Interval History: Confused/combative overnight. Resting comfortably, no complaint of SOB or CP overnight       Objective   Vital Signs  Temp:  [97.5 °F (36.4 °C)-98.6 °F (37 °C)] 98.6 °F (37 °C)  Heart Rate:  [82-99] 99  Resp:  [20-24] 20  BP: (119-152)/(74-90) 140/88    Intake/Output Summary (Last 24 hours) at 10/09/17 0844  Last data filed at 10/09/17 0700   Gross per 24 hour   Intake              120 ml   Output                0 ml   Net              120 ml     Flowsheet Rows         First Filed Value    Admission Height  71\" (180.3 cm) Documented at 10/08/2017 0537    Admission Weight  190 lb (86.2 kg) Documented at 10/08/2017 05          Physical Exam:   General Appearance:    Resting comfortably, in no acute distress   Lungs:     Clear to auscultation.  Normal respiratory effort and rate.      Heart:    irregular rhythm and normal rate, normal S1 and S2, no murmurs, gallops or rubs.     Chest Wall:    No abnormalities observed   Abdomen:     Soft, nontender, positive bowel sounds.     Extremities:   no cyanosis, clubbing or edema.  No marked joint deformities.  Adequate musculoskeletal strength.       Results Review:      Results from last 7 days  Lab Units 10/09/17  0352   SODIUM mmol/L 144   POTASSIUM mmol/L 4.6   CHLORIDE mmol/L 109*   CO2 mmol/L 23.6   BUN mg/dL 19   CREATININE mg/dL 1.43*   GLUCOSE mg/dL 109*   CALCIUM mg/dL 10.1       Results from last 7 days  Lab Units 10/08/17  1034   CK TOTAL U/L 30   TROPONIN T ng/mL <0.010       Results from last 7 days  Lab Units 10/09/17  0352   WBC 10*3/mm3 7.11   HEMOGLOBIN g/dL 9.2*   HEMATOCRIT % 32.8*   PLATELETS 10*3/mm3 168       Results from last 7 days  Lab Units 10/09/17  0352 10/08/17  1033   INR  1.31* 1.29*                       Medication Review:     aspirin 81 mg Oral " Daily   Divalproex Sodium 250 mg Oral TID   ferrous sulfate 325 mg Oral BID   gabapentin 300 mg Oral TID   metoprolol tartrate 25 mg Oral BID   multivitamin with minerals 1 tablet Oral Daily   traZODone 100 mg Oral Nightly          sodium chloride 50 mL/hr       Assessment/Plan   Active Hospital Problems (** Indicates Principal Problem)    Diagnosis Date Noted   • **Closed fracture of right hip [S72.001A] 10/08/2017   • CAD (coronary artery disease) [I25.10] 10/08/2017   • Hypertension [I10] 10/08/2017   • COPD (chronic obstructive pulmonary disease) [J44.9] 10/08/2017   • DM (diabetes mellitus), type 2 [E11.9] 10/08/2017   • Dementia [F03.90] 10/08/2017   • Atrial fibrillation [I48.91] 10/08/2017   • History of DVT (deep vein thrombosis) [Z86.718] 10/08/2017   • DNR (do not resuscitate) [Z66] 10/08/2017   • Anticoagulated on Coumadin [Z51.81, Z79.01] 10/08/2017   • Iron deficiency anemia [D50.9] 10/08/2017   • CKD (chronic kidney disease), stage III [N18.3] 10/08/2017   • Hyperkalemia [E87.5] 10/08/2017      Resolved Hospital Problems    Diagnosis Date Noted Date Resolved   No resolved problems to display.     1. Fractured hip.  He is Wilfredo's revised moderate risk.  None of those modifiable.  No further cardiovascular evaluation or treatment needed.  He did have a perfusion stress test in 2014 that was unremarkable and an echocardiogram within the last 2 months showed preserved left ventricular ejection fraction. For potential surgery today.  2.  History of coronary disease.  A remote infarct.  Preserved ejection fraction- no angina, continue home therapy.  3.  Atrial fibrillation persistent- rate controlled on warfarin, to be resumed postoperatively.  High risk for embolic event.  4.  History of hypertension- follow blood pressure.  5.  History of nonsustained tachycardia tachycardia- no further treatment required  6.  History of dementia- confused and combative overnight    Farooq Lim III, MD  [facility]  10/09/17  8:44 AM

## 2017-10-09 NOTE — PLAN OF CARE
Problem: Patient Care Overview (Adult)  Goal: Plan of Care Review  Outcome: Ongoing (interventions implemented as appropriate)    10/09/17 0338   Coping/Psychosocial Response Interventions   Plan Of Care Reviewed With patient   Patient Care Overview   Progress unable to show any progress toward functional goals   Outcome Evaluation   Outcome Summary/Follow up Plan Patient is confuse and refused all evening medicines, became combative when trying to encourage to take his medicine, MD is aware. Patient has no IV access, according to dayshift RN, patient pulled out the IV and IV RN unable to place IV d/t combative. Received order from Dr. Florentino to administer Haldol 1 mg IM with good result, IV Nurse able to insert IV, patient fell asleep after. Spoke with patient's daughter and POA, daughter stated that she will come in the morning to talk to the Dr first before signing the consent. Will continue to monitor the patient .         Problem: Fractured Hip (Adult)  Goal: Signs and Symptoms of Listed Potential Problems Will be Absent or Manageable (Fractured Hip)  Outcome: Ongoing (interventions implemented as appropriate)    Problem: Fall Risk (Adult)  Goal: Absence of Falls  Outcome: Ongoing (interventions implemented as appropriate)

## 2017-10-09 NOTE — NURSING NOTE
Patient is very confuse and refused to take all his evening medicines, became agitated and combative when tried to encourage to take the medicine especially the pain medicine because patient is in pain.  Dr Florentino made aware that patient refused to take all his evening medicine. Dr. Florentino discontinued the Seroquel that he ordered to calm the patient down in order for the IV nurse to insert IV to the patient, MD ordered Haldol IM instead.

## 2017-10-09 NOTE — PLAN OF CARE
Problem: Patient Care Overview (Adult)  Goal: Plan of Care Review  Outcome: Ongoing (interventions implemented as appropriate)    10/09/17 1540   Coping/Psychosocial Response Interventions   Plan Of Care Reviewed With patient   Patient Care Overview   Progress improving   Outcome Evaluation   Outcome Summary/Follow up Plan Pain tolerable with IV pain meds, patient is confused and refuses medications, MD aware. After Ativan IV ordered prn patient is calmly resting in bed now. Daughter at bedside, educated family/pt on importance of blood pressure montioring, family verblized understanding. plan for surgery tomorrow evening.         Problem: Fractured Hip (Adult)  Goal: Signs and Symptoms of Listed Potential Problems Will be Absent or Manageable (Fractured Hip)  Outcome: Ongoing (interventions implemented as appropriate)    Problem: Fall Risk (Adult)  Goal: Identify Related Risk Factors and Signs and Symptoms  Outcome: Outcome(s) achieved Date Met:  10/09/17  Goal: Absence of Falls  Outcome: Ongoing (interventions implemented as appropriate)    Problem: Skin Integrity Impairment, Risk/Actual (Adult)  Goal: Skin Integrity/Wound Healing  Outcome: Ongoing (interventions implemented as appropriate)

## 2017-10-09 NOTE — PLAN OF CARE
Problem: Patient Care Overview (Adult)  Goal: Plan of Care Review  Outcome: Ongoing (interventions implemented as appropriate)    10/08/17 2111   Coping/Psychosocial Response Interventions   Plan Of Care Reviewed With patient   Patient Care Overview   Progress unable to show any progress toward functional goals   Outcome Evaluation   Outcome Summary/Follow up Plan No family to provide information, patient confused, lives in facility. Plan is for Surgery by Dr. Dickerson. Left message with Adilene CEDENO) to return call to the floor.        Goal: Adult Individualization and Mutuality  Outcome: Ongoing (interventions implemented as appropriate)  Goal: Discharge Needs Assessment  Outcome: Ongoing (interventions implemented as appropriate)    Problem: Fractured Hip (Adult)  Goal: Signs and Symptoms of Listed Potential Problems Will be Absent or Manageable (Fractured Hip)  Outcome: Ongoing (interventions implemented as appropriate)    Problem: Fall Risk (Adult)  Goal: Identify Related Risk Factors and Signs and Symptoms  Outcome: Ongoing (interventions implemented as appropriate)  Goal: Absence of Falls  Outcome: Ongoing (interventions implemented as appropriate)

## 2017-10-09 NOTE — PROGRESS NOTES
Discharge Planning Assessment  [facility]     Patient Name: Darrian Davis  MRN: [id number]  Today's Date: 10/9/2017    Admit Date: 10/8/2017          Discharge Needs Assessment     None            Discharge Plan       10/09/17 1631    Case Management/Social Work Plan    Additional Comments Pt is from [facility]. RTR book left at the bedside, CCP will f/u w/ pt's POA after surgery (POA/Diana [phone]).         Discharge Placement     Facility/Agency Request Status Selected? Address Phone Number Fax Number    [facility] Pending - No Request Sent     [id number] [address] [phone]                Demographic Summary     None            Functional Status     None            Psychosocial     None            Abuse/Neglect     None            Legal     None            Substance Abuse     None            Patient Forms     None          Shayna Castellano RN

## 2017-10-10 PROBLEM — E87.5 HYPERKALEMIA: Status: RESOLVED | Noted: 2017-01-01 | Resolved: 2017-01-01

## 2017-10-10 NOTE — PLAN OF CARE
Problem: Patient Care Overview (Adult)  Goal: Plan of Care Review  Outcome: Ongoing (interventions implemented as appropriate)    10/10/17 1900   Coping/Psychosocial Response Interventions   Plan Of Care Reviewed With patient   Patient Care Overview   Progress improving   Outcome Evaluation   Outcome Summary/Follow up Plan arrived back to floor from PACUat 1825. vss. patient resting comfortably and is alot less anxious and angry than previously. baseline dementia. no compalints of pain, CPOT 0. monitoring bp for hx of HTN, meds admin as ordered. DTV at 0050, patient incontinent         Problem: Perioperative Period (Adult)  Goal: Signs and Symptoms of Listed Potential Problems Will be Absent or Manageable (Perioperative Period)  Outcome: Ongoing (interventions implemented as appropriate)

## 2017-10-10 NOTE — CONSULTS
Orthopedic Consult    Patient: Darrian Davis                                           YOB: 1940     Date of Admission: 10/8/2017  5:40 AM            Medical Record Number: [id number]    Attending Physician: Merry Tucker MD    Consulting Physician: Greg Dickerson MD    Reason for Consult: Right  hip fracture.    History of Present Illness: 77 y.o. male admitted to [facility] with Multiple contusions [T07.XXXA]  Fall, initial encounter [W19.XXXA]  Closed fracture of right hip, initial encounter [S72.001A].     The patient was evaluated in the emergency room and was diagnosed with a  hip fracture.  He is a nursing home resident  Secondary to his severe dementia.   Secondary to the age and multiple medical co morbidities the patient was admitted to the hospitalist. Dr Zamora was on call for the emergency room and he  Consulted me  for further evaluation and treatment. The patient was in the usual state of health and fell from standing height in his nursing home, Resulting in sudden onset hip pain and inability to ambulate. Denies any history of loss of consciousness, headache, vomiting, or seizures.   Denies any other injuries. The patient is accompanied by his daughter family members  to this hospital visit.   The patient denies any prior  pre-existing pain in the hip or prior use of any assistive device.   The patient  lives at nursing home  , is quite active and has dementia.  The patient has history of dementia.    Patient denies any history of: MRSA,  CHF, .   The patient has history of :  DVT/PE,  COPD, CAD, Diabetes mellitus, Dementia or A-Fib  The patient is on anticoagulants: Warfarin, INR 1.33    Past medical history, past surgical history, social history, family history, ALLERGIES, current medications have been reviewed by me.    Past Medical History:   Diagnosis Date   • Anxiety    • Arthritis    • Atrial fibrillation    • CAD (coronary artery disease)    •  Confusion    • COPD (chronic obstructive pulmonary disease)    • Dementia    • Depression    • DM (diabetes mellitus), type 2    • DVT (deep venous thrombosis)    • History of transfusion    • Hypertension    • Kidney stone    • Myocardial infarction    • PE (pulmonary thromboembolism)    • Prostate cancer    • Skin cancer    • Stroke      Past Surgical History:   Procedure Laterality Date   • BACK SURGERY     • COLONOSCOPY     • HIP SURGERY     • PROSTATE SURGERY       Social History     Occupational History   • Not on file.     Social History Main Topics   • Smoking status: Former Smoker     Packs/day: 2.00     Years: 40.00     Types: Cigarettes   • Smokeless tobacco: Never Used   • Alcohol use No   • Drug use: No   • Sexual activity: Defer      Social History     Social History Narrative     Family History   Problem Relation Age of Onset   • Family history unknown: Yes        Allergies   Allergen Reactions   • Bee Venom    • Iodine Hives   • Latex    • Propoxyphene Hives       Home Medications:  Prescriptions Prior to Admission   Medication Sig Dispense Refill Last Dose   • aspirin 81 MG tablet Take 81 mg by mouth Daily.   9/9/2017 at 0800   • Divalproex Sodium (DEPAKOTE SPRINKLE) 125 MG capsule Take 250 mg by mouth 3 (Three) Times a Day.   9/9/2017 at 0800   • ferrous sulfate (IRON SUPPLEMENT) 325 (65 FE) MG tablet Take 1 tablet by mouth 2 (Two) Times a Day. 60 tablet 2 9/9/2017 at 0800   • gabapentin (NEURONTIN) 300 MG capsule Take 1 capsule by mouth 3 (Three) Times a Day. 90 capsule 0 9/9/2017 at 0800   • metoprolol tartrate (LOPRESSOR) 25 MG tablet Take 1 tablet by mouth 2 (Two) Times a Day. 60 tablet 0 9/9/2017 at 0800   • Multiple Vitamins-Minerals (MULTIVITAMIN ADULT PO) Take  by mouth Daily.   9/9/2017 at 0800   • nitroglycerin (NITROSTAT) 0.4 MG SL tablet Place 0.4 mg under the tongue Every 5 (Five) Minutes As Needed for Chest Pain. Take no more than 3 doses in 15 minutes.   Unknown at Unknown time   •  Omega-3 Fatty Acids (FISH OIL) 1000 MG capsule capsule Take 1,000 mg by mouth Daily With Breakfast.   9/9/2017 at 0800   • traZODone (DESYREL) 100 MG tablet Take 100 mg by mouth Every Night.   9/8/2017 at 2000   • cephalexin (KEFLEX) 500 MG capsule Take 1 capsule by mouth 3 (Three) Times a Day. 15 capsule 0    • diphenoxylate-atropine (LOMOTIL) 2.5-0.025 MG per tablet Take 1 tablet by mouth Daily As Needed for Diarrhea.      • levoFLOXacin (LEVAQUIN) 750 MG tablet Take 1 tablet by mouth Daily. 3 tablet 0 9/9/2017 at 0800   • warfarin (COUMADIN) 1 MG tablet TAKE ONE TABLET BY MOUTH ONCE DAILY FOR 30 DAYS OR AS DIRECTED WEEKLY TO KEEP INR 2-3 30 tablet 0 Unknown at Unknown time   • warfarin (COUMADIN) 5 MG tablet Take 2.5 mg by mouth Daily.   10/7/2017 at UNKNOWN       Current Medications:  Scheduled Meds:  [MAR Hold] aspirin 81 mg Oral Daily   Divalproex Sodium 250 mg Oral TID   [MAR Hold] ferrous sulfate 325 mg Oral BID   gabapentin 300 mg Oral TID   metoprolol tartrate 25 mg Oral BID   [MAR Hold] multivitamin with minerals 1 tablet Oral Daily   [MAR Hold] traZODone 100 mg Oral Nightly     Continuous Infusions:  lactated ringers 9 mL/hr Last Rate: 9 mL/hr (10/10/17 1455)   sodium chloride 50 mL/hr      PRN Meds:.•  [MAR Hold] acetaminophen  •  [MAR Hold] dextrose  •  [MAR Hold] dextrose  •  diphenhydrAMINE  •  ePHEDrine  •  fentanyl  •  flumazenil  •  [MAR Hold] glucagon (human recombinant)  •  hydrALAZINE  •  [MAR Hold] HYDROcodone-acetaminophen  •  HYDROcodone-acetaminophen  •  HYDROmorphone  •  labetalol  •  [MAR Hold] LORazepam  •  [MAR Hold] Morphine **AND** [MAR Hold] naloxone  •  naloxone  •  [MAR Hold] nitroglycerin  •  ondansetron  •  oxyCODONE-acetaminophen  •  promethazine **OR** promethazine **OR** promethazine **OR** promethazine  •  promethazine  •  [MAR Hold] sodium chloride    Review of Systems:   A 12 point system review was reviewed with the patient and from the chart  and is negative except as in  history of present illness.      Physical Exam: 77 y.o. male                    Vitals:    10/10/17 0300 10/10/17 1315 10/10/17 1338 10/10/17 1500   BP:   173/95    BP Location:   Left arm    Patient Position:   Lying    Pulse: 96  105    Resp: 16  16    Temp:   97.7 °F (36.5 °C) Comment: pts off unit   TempSrc:   Temporal Artery     SpO2: 92% 92% (!) 88%    Weight:       Height:            Gait: Could not be tested , patient is nonambulatory.    Mental/HEENT/cardio/skin: The patient's general appearance was well-nourished, well-hydrated, no acute distress.  Orientation was alert and oriented ×3.  The patient's mood was normal.   Pulmonary exam shows normal late exchange, no labored breathing, or shortness of breath.    The skin exam showed normal temperature and color in the area of examination.    Extremities: Right   lower extremity positive shortening, positive external rotation, attempted movements of the  hip are painful and restricted. The patient is able to do gentle active range of motion of her toes. Gross sensation is intact over the toes.    Pulses: Pulses palpable and equal bilaterally    Diagnostic Tests:      Results from last 7 days  Lab Units 10/10/17  0554 10/09/17  0352 10/08/17  1034   WBC 10*3/mm3 7.80 7.11 6.25   HEMOGLOBIN g/dL 9.4* 9.2* 9.2*   HEMATOCRIT % 32.8* 32.8* 31.0*   PLATELETS 10*3/mm3 181 168 164       Results from last 7 days  Lab Units 10/10/17  0554 10/09/17  0352 10/08/17  1034   SODIUM mmol/L 146* 144 142   POTASSIUM mmol/L 4.6 4.6 4.1   CHLORIDE mmol/L 107 109* 108*   CO2 mmol/L 23.6 23.6 25.0   BUN mg/dL 23 19 16   CREATININE mg/dL 1.26 1.43* 1.24   GLUCOSE mg/dL 125* 109* 111*   CALCIUM mg/dL 10.4 10.1 10.1       Results from last 7 days  Lab Units 10/10/17  0554 10/09/17  0352 10/08/17  1033   INR  1.33* 1.31* 1.29*     No results found for: CRYSTAL]  No results found for: CULTURE]  No results found for: URICACID]  Xr Hand 3+ View Right    Result Date: 10/8/2017  Narrative:  RIGHT HAND RADIOGRAPHS  HISTORY: 77-year-old male with a history of right hand pain status post fall.  FINDINGS: PA and lateral radiographs of the right hand were obtained and demonstrate diffuse osteopenia. There is evidence of plate and screw fixation of the radius with the carpal bones and 3rd metacarpal bone. Alignment appears appropriate. Joint space narrowing of the DIP joint of the 2nd digit and MCP joint of the 1st digit is noted.      Impression: There is no evidence for an acute abnormality of the right hand. Plate and screw fixation of the distal forearm, wrist and 2nd metacarpal is noted.  This report was finalized on 10/8/2017 5:18 PM by Dr. Rhett Hdz MD.      Ct Head Without Contrast    Result Date: 10/9/2017  Narrative: HISTORY: Fell, possible head injury. Confusion. Neck pain.  CT OF THE BRAIN WITHOUT CONTRAST 10/08/2017  TECHNIQUE: Axial images were obtained through the brain without intravenous contrast.  COMPARISON: Previous study dated 08/12/2017 is compared.  FINDINGS:  There is moderate diffuse atrophy. There is evidence of previous left frontal craniotomy. There is decreased attenuation of the periventricular white matter bilaterally consistent with small vessel white matter ischemic disease.  There is no evidence of acute infarction, hemorrhage, midline shift or mass effect.  No skull fractures are seen.      Impression: 1. Evidence of previous left frontal craniotomy, atrophy and small vessel white matter ischemic disease. 2. No acute intracranial process identified.   CT OF THE CERVICAL SPINE WITHOUT CONTRAST 10/08/2017  TECHNIQUE: Axial images were obtained from the skull base to the upper thoracic spine. Sagittal and coronal reconstruction images were reviewed.  There is degenerative change of the C1-C2 articulation.  There is C3-4, C4-5, C5-6, C6-7 and C7-T1 spondylosis.  Bilateral carotid artery calcification.  IMPRESSION: 1. Cervical degenerative disease as described. 2. No  cervical spine fractures are seen. No significant subluxation is seen.  Radiation dose reduction techniques were utilized, including automated exposure control and exposure modulation based on body size.  This report was finalized on 10/9/2017 4:04 PM by Dr. Juvencio Stark MD.      Ct Cervical Spine Without Contrast    Result Date: 10/9/2017  Narrative: HISTORY: Fell, possible head injury. Confusion. Neck pain.  CT OF THE BRAIN WITHOUT CONTRAST 10/08/2017  TECHNIQUE: Axial images were obtained through the brain without intravenous contrast.  COMPARISON: Previous study dated 08/12/2017 is compared.  FINDINGS:  There is moderate diffuse atrophy. There is evidence of previous left frontal craniotomy. There is decreased attenuation of the periventricular white matter bilaterally consistent with small vessel white matter ischemic disease.  There is no evidence of acute infarction, hemorrhage, midline shift or mass effect.  No skull fractures are seen.      Impression: 1. Evidence of previous left frontal craniotomy, atrophy and small vessel white matter ischemic disease. 2. No acute intracranial process identified.   CT OF THE CERVICAL SPINE WITHOUT CONTRAST 10/08/2017  TECHNIQUE: Axial images were obtained from the skull base to the upper thoracic spine. Sagittal and coronal reconstruction images were reviewed.  There is degenerative change of the C1-C2 articulation.  There is C3-4, C4-5, C5-6, C6-7 and C7-T1 spondylosis.  Bilateral carotid artery calcification.  IMPRESSION: 1. Cervical degenerative disease as described. 2. No cervical spine fractures are seen. No significant subluxation is seen.  Radiation dose reduction techniques were utilized, including automated exposure control and exposure modulation based on body size.  This report was finalized on 10/9/2017 4:04 PM by Dr. Juvencio Stark MD.      Xr Chest 1 View    Result Date: 10/9/2017  Narrative: HISTORY: Fell, right hip pain.  AP PELVIS  FINDINGS:  2  views of the pelvis show a minimally displaced fracture of the right femoral neck. No other fractures of the pelvis are seen. Proximal left femur appears intact.      Impression: Right femoral neck fracture.  AP CHEST  FINDINGS:  Heart size is mildly enlarged. Lungs are slightly underinflated with some vascular crowding. There is some bilateral vascular congestion. There may be some minimal interstitial edema as the interstitial markings appear somewhat prominent. There also may be a tiny amount of fluid in the minor fissure on the right. There is some calcification of the mitral valve annulus.  IMPRESSION: 1. Mild vascular congestion and suggestive mild interstitial edema somewhat more prominent than on 08/12/2017. This finding is somewhat exaggerated by suboptimal inspiration. 2. Mild cardiomegaly.  This report was finalized on 10/9/2017 4:05 PM by Dr. Juvencio Stark MD.      Xr Pelvis 1 Or 2 View    Result Date: 10/9/2017  Narrative: HISTORY: Fell, right hip pain.  AP PELVIS  FINDINGS:  2 views of the pelvis show a minimally displaced fracture of the right femoral neck. No other fractures of the pelvis are seen. Proximal left femur appears intact.      Impression: Right femoral neck fracture.  AP CHEST  FINDINGS:  Heart size is mildly enlarged. Lungs are slightly underinflated with some vascular crowding. There is some bilateral vascular congestion. There may be some minimal interstitial edema as the interstitial markings appear somewhat prominent. There also may be a tiny amount of fluid in the minor fissure on the right. There is some calcification of the mitral valve annulus.  IMPRESSION: 1. Mild vascular congestion and suggestive mild interstitial edema somewhat more prominent than on 08/12/2017. This finding is somewhat exaggerated by suboptimal inspiration. 2. Mild cardiomegaly.  This report was finalized on 10/9/2017 4:05 PM by Dr. Juvencio Stark MD.        Assessment: Right  Intracapsular Hip  Fracture. Eladio type 3    Patient Active Problem List   Diagnosis   • Severe sepsis   • Acute on chronic renal insufficiency   • Hypernatremia   • Dehydration   • Atrial fibrillation   • Nonsustained ventricular tachycardia   • Dementia   • Closed fracture of right hip   • CAD (coronary artery disease)   • Hypertension   • COPD (chronic obstructive pulmonary disease)   • DM (diabetes mellitus), type 2   • Dementia   • Atrial fibrillation   • History of DVT (deep vein thrombosis)   • DNR (do not resuscitate)   • Anticoagulated on Coumadin   • Iron deficiency anemia   • CKD (chronic kidney disease), stage III   • Hyperkalemia       Plan:    The patient is indicated for a  partial or total hip arthroplasty.  The likely,  Risks and benefits of the procedure including but not limited to infection, DVT, pulmonary embolism,  leg length discrepancy, recurrent dislocation, possibility of injury to nerves or vessels, possibility of periprosthetic fractures have been discussed in detail.  Despite the risks involved, The patient's daughter would like to proceed.  The patient is being scheduled for a partial  hip arthroplasty by anterolateral  approach at [facility] tentatively for Oct 10, 2017 .     Patient will be made NPO.   Obtain informed consent.   The patients warfarin has been on hold.   The patient's admitting service has seen the patient and the patient is cleared to the operating room.      Date: 10/10/2017    Greg Dickerson MD    CC: Narciso Ma MD; MD Merry Frazier MD

## 2017-10-10 NOTE — OP NOTE
OPERATIVE NOTE    Patient: Darrian Davis  YOB: 1940  Medical Record Number: [id number]  Attending Physician: Merry Tucker MD    Date of Service: 10/10/2017    PREOPERATIVE DIAGNOSES:  Right femoral neck fracture, intracapsular with displacement    Postoperative diagnosis :  Right femoral neck fracture, intracapsular with displacement     PROCEDURES PERFORMED: HIP BIPOLAR CEMENTED PARTIAL HIP REPLACEMENT RIGHT    Cemented BIPOLAR HIP  utilizing a modified Cook anterolateral  approach with Depuy Cardale  femoral stem size # 6 standard neck, and a Bipolar head component outer diameter 51 mm, Articul/sean  Femoral Head 28 mm and + 1 mm neck  (Depuy.)    SURGEON: Greg Dickerson MD    ASSISTANT: Loco Alegre MD, Fellow  Kay BELL    The services of a first assist were necessary for performing the procedure safely and expeditiously.  The first assist was present for the entire duration of the case and helped with positioning, retraction and closure of the incision.     ANESTHESIA: General anesthesia. General  Anesthesiologist: Jessee Urbano MD; Kylee Adams MD  CRNA: Josesito Goodman CRNA     ESTIMATED BLOOD LOSS: 150 mL    SPECIMENS: * No orders in the log *      COMPLICATIONS: Nil.      DRAINS:            INDICATIONS: 77 y.o. male was brought to the [facility] Emergency Room following a complaint of  pain in the hip. The patient was evaluated in the emergency room and x-rays confirmed the presence of a displaced intracapsular femoral neck fracture. .  He has a history of severe dementia.  His daughter is the power of .  My partner, Dr. Zamora was on call for the emergency room, and he consulted me to evaluate and manage this fracture. The patient has been admitted to the hospital internist to optimize secondary to his multiple medical comorbidities. The patient's history was reviewed and preoperative medications were reviewed specifically for  anticoagulants. A risk assessment was made for any recent DVT or PE and infection risk.      The patient's options and alternatives were discussed in detail with the patient and  his daughter family members present. The patient was indicated for a partial hip replacement. The patient's daughter  elected to proceed with a hip endoprosthesis. Likely risks and benefits of the procedure including, but not limited to infection, DVT, pulmonary embolism, leg length discrepancy, recurrent dislocation, periprosthetic fractures, possibility of injury to nerves or vessels, risk for cardiac events, MI, stroke, post operative delirium,  mortality, morbidity, and immobility syndrome have been discussed in detail. Despite the risks involved, the patient and family elected to proceed. An informed consent has been obtained, and patient  has been scheduled for surgery as a semi-emergency.  The  patient is a high risk secondary to his severe dementia. I have given the option for hospice to the daughter, but she elected to proceed with surgery.  Informed consent was obtained and the operative site was marked.      DESCRIPTION OF PROCEDURE: The patient has been transferred to [facility] Operating Room. Preoperative antibiotics were given in the form of vancomycin and Kefzol IV according to SCIP protocol prior to the incision. .  Vancomycin was given secondary to his recent stay in a long-term facility.  After achieving adequate general anesthesia, the patient was placed in the lateral position utilizing a pegboard. All bony prominences were padded well. The operative hip was prepped and draped in the usual sterile fashion. Surgical time-out was done. Correct patient, surgical side and site were identified.   Tranexamic acid was given Topically at the time of closure.      A skin incision was made centering over the greater trochanter for a modified Cook anterolateral  approach. Skin and subcutaneous tissue were incised  and the deep fascia was incised in line with the skin incision. The gluteus hue muscle fibers were split in their direction. the anterior aspect of the trochanter was identified and the gluteus medius tendon was split in its anterior third and posterior two thirds along with a sleeve of tissue from the vastus lateralis.  External rotation was utilized to help with the exposure.  The capsule was released. There was a hemarthrosis. This was evacuated. Femoral neck fracture was identified. The femoral neck was trimmed at the lowest portion of the fracture site utilizing a reciprocating saw. Followed by this, the femoral head was extracted with a corkscrew. The femoral head measured 51  mm in its outer diameter. The acetabular cavity was inspected and bone debris was cleared from the cavity. The cartilage was found to be satisfactory. The labrum was intact. It was planned to proceed with the endoprosthesis. A trial femoral head was seated and there was a good suction fit.  The femoral neck was elevated with the help of a neck elevating Delcid retractor and proximal femur was entered with a box chisel, followed by canal finder, followed by serial broaching. Adequate fit was found to be obtained with a size 6  broach.Trial reduction was performed. Reduction was found to be satisfactory with good restoration of leg lengths.  Range of motion was checked and there was excellent range of motion with good stability throughout the range for flexion, adduction , internal  rotation and external rotation. There was no sign of impingement.  The trial was dislocated and the   broach was replaced with a size 6 Talbot collared, standard neck, cemented femoral stem. The stem was cemented in with an intramedullary canal plug, two batches of Tobramycin Simplex cement , with pressurization and vacuum mixing with a cement gun. I used about 15 degrees of anteversion matching his natural anteversion  to minimize the risk of   Anterior  and posterior dislocation.  Excess cement was removed and cement was allowed to set.  The Bipolar 51  mm outer diameter with a  28 mm outer diameter with  +1 mm neck length was assembled on the back table and was seated into position, checked again for stability and the hip was reduced. Reduction was found to be satisfactory. The hip joint was thoroughly irrigated with saline and soft tissue hemostasis was secured. The sponge and needle count was found to correct. The gluteus medius tendon was repaired and was re-anchored to the greater trochanter with the help of FiberWire sutures and drill holes made into the greater trochanter. The incision was closed in layers with Ethibond, vicryl and staples. Sterile dressings were placed and the patient was transferred to the recovery room in a stable condition.     The patient prior to closure received periarticular injection of  Naropin, clonidine, and ketorolac mixture. The patient tolerated the procedure well and had adequate distal pulses and good capillary refill. The patient was then transferred to the recovery room and then later to the floor without any complications. The patient will receive postoperative antibiotics in the form of Kefzol IV q.8 h.within the first 24 hours for 2 more doses according to SCIP protocol.  DVT prophylaxis  With lovenox will begin in the morning.     I discussed the satisfactory performance of the procedure with the patient's daughter over the phone and discussed with them The postoperative management.     Greg Dickerson M.D.*    10/10/2017    cc:  Narciso Ma MD; MD Merry Frazier MD

## 2017-10-10 NOTE — NURSING NOTE
Called POA and got verbal consent over the phone for patients upcoming surgery today with Katie High RN as a witness.

## 2017-10-10 NOTE — ANESTHESIA POSTPROCEDURE EVALUATION
Patient: Darrian Davis    Procedure Summary     Date Anesthesia Start Anesthesia Stop Room / Location    10/10/17 [id number]  [facility] OR 07 / [facility] OR       Procedure Diagnosis Surgeon Provider    HIP BIPOLAR CEMENTED (Right Hip) Closed fracture of right hip, initial encounter  (Closed fracture of right hip, initial encounter [S72.001A]) MD Jessee Briggs MD          Anesthesia Type: general  Last vitals  BP   129/91 (10/10/17 1800)    Temp        Pulse   101 (10/10/17 1800)   Resp   18 (10/10/17 1800)    SpO2   97 % (10/10/17 1800)      Post Anesthesia Care and Evaluation    Patient location during evaluation: PACU  Patient participation: complete - patient participated  Level of consciousness: awake and alert  Pain management: adequate  Airway patency: patent  Anesthetic complications: No anesthetic complications    Cardiovascular status: acceptable  Respiratory status: acceptable  Hydration status: acceptable

## 2017-10-10 NOTE — ANESTHESIA PREPROCEDURE EVALUATION
Anesthesia Evaluation     Patient summary reviewed and Nursing notes reviewed   history of anesthetic complications:  NPO Solid Status: > 8 hours  NPO Liquid Status: > 8 hours     Airway   Comment: Unable to assess  Dental    (+) upper dentures    Pulmonary - normal exam    breath sounds clear to auscultation  (+) pulmonary embolism, a smoker Former, COPD,   Cardiovascular     ECG reviewed  Rhythm: irregular  Rate: normal    (+) hypertension, past MI , CAD, dysrhythmias Atrial Fib, DVT,       Neuro/Psych  (+) CVA, psychiatric history, dementia,    GI/Hepatic/Renal/Endo    (+)  diabetes mellitus type 2,     Musculoskeletal     Abdominal  - normal exam   Substance History - negative use     OB/GYN negative ob/gyn ROS         Other   (+) arthritis   history of cancer                                    Anesthesia Plan    ASA 3     general     intravenous induction   Anesthetic plan and risks discussed with patient.

## 2017-10-10 NOTE — PLAN OF CARE
Problem: Patient Care Overview (Adult)  Goal: Plan of Care Review  Outcome: Ongoing (interventions implemented as appropriate)    10/10/17 0449   Coping/Psychosocial Response Interventions   Plan Of Care Reviewed With patient   Patient Care Overview   Progress no change   Outcome Evaluation   Outcome Summary/Follow up Plan patient is confused and very combative unless asleep; refuses blood pressure at times; MD aware; ativan given x 2 doses; education provided on blood pressure monitoring and medications r/t htn; plan for surgery in afternoon       Goal: Adult Individualization and Mutuality  Outcome: Ongoing (interventions implemented as appropriate)    Problem: Fractured Hip (Adult)  Goal: Signs and Symptoms of Listed Potential Problems Will be Absent or Manageable (Fractured Hip)  Outcome: Ongoing (interventions implemented as appropriate)    Problem: Fall Risk (Adult)  Goal: Absence of Falls  Outcome: Ongoing (interventions implemented as appropriate)    Problem: Skin Integrity Impairment, Risk/Actual (Adult)  Goal: Skin Integrity/Wound Healing  Outcome: Ongoing (interventions implemented as appropriate)

## 2017-10-10 NOTE — ANESTHESIA PROCEDURE NOTES
Airway  Urgency: elective    Date/Time: 10/10/2017 3:25 PM  Airway not difficult    General Information and Staff    Patient location during procedure: OR  Anesthesiologist: ENID MILLER  CRNA: TU [id number]    Indications and Patient Condition  Indications for airway management: airway protection    Preoxygenated: yes  Mask difficulty assessment: 1 - vent by mask    Final Airway Details  Final airway type: endotracheal airway      Successful airway: ETT  Cuffed: yes   Successful intubation technique: direct laryngoscopy  Endotracheal tube insertion site: oral  Blade: [place]  Blade size: #2  ETT size: 8.0 mm  Cormack-Lehane Classification: grade IIb - view of arytenoids or posterior of glottis only  Placement verified by: chest auscultation and capnometry   Measured from: lips  ETT to lips (cm): 22  Number of attempts at approach: 1

## 2017-10-10 NOTE — BRIEF OP NOTE
HIP SPACER INSERTION WITH ANTIBIOTIC CEMENT  Progress Note    Darrian Davis  10/8/2017 - 10/10/2017    Pre-op Diagnosis:   Closed fracture of right hip, initial encounter [S72.001A]       Post-Op Diagnosis Codes:     * Closed fracture of right hip, initial encounter [S72.001A]    Procedure/CPT® Codes:      Procedure(s):  HIP BIPOLAR CEMENTED RIGHT    Surgeon(s):  MD Loco Briggs MD    Anesthesia: General    Staff:   Circulator: Gloria Bentley RN; Shannon Spurling, RN  Scrub Person: Dottie Burns  Vendor Representative: West Ramos  Assistant: Rachel Gatica    Estimated Blood Loss: 150 mL    Urine Voided: * No values recorded between 10/10/2017  3:14 PM and 10/10/2017  4:35 PM *    Specimens:                None      Drains:           Findings: see dict     Complications: argentina Dickerson MD     Date: 10/10/2017  Time: 4:35 PM

## 2017-10-10 NOTE — PROGRESS NOTES
"     LOS: 2 days   Primary Care Physician: Narciso Ma MD     Subjective   In PACU now. Just starting to wake up. Mumbling a little. Doesn't answer questions    Vital Signs  Body mass index is 26.5 kg/(m^2).  Temp:  [97.7 °F (36.5 °C)-99.2 °F (37.3 °C)] 99.2 °F (37.3 °C)  Heart Rate:  [] 97  Resp:  [16-20] 16  BP: (119-173)/(76-96) 122/77      Objective:  General Appearance:  In no acute distress (elderly and frail. looks older than age).    Vital signs: (most recent): Blood pressure 122/77, pulse 97, temperature 99.2 °F (37.3 °C), temperature source Oral, resp. rate 16, height 71\" (180.3 cm), weight 190 lb (86.2 kg), SpO2 92 %.    Lungs:  There are wheezes, rales and decreased breath sounds.  No rhonchi.  (Light occ rales. occ end exp wheezes)  Heart: Tachycardia.  Irregular rhythm.  No murmur.   Abdomen: Abdomen is soft and non-distended.  There is no abdominal tenderness.   There is no splenomegaly. There is no hepatomegaly.   Extremities: There is no dependent edema.          Results Review:    I reviewed the patient's new clinical results.      Results from last 7 days  Lab Units 10/10/17  0554 10/09/17  0352   WBC 10*3/mm3 7.80 7.11   HEMOGLOBIN g/dL 9.4* 9.2*   PLATELETS 10*3/mm3 181 168       Results from last 7 days  Lab Units 10/10/17  0554 10/09/17  0352   SODIUM mmol/L 146* 144   POTASSIUM mmol/L 4.6 4.6   CHLORIDE mmol/L 107 109*   CO2 mmol/L 23.6 23.6   BUN mg/dL 23 19   CREATININE mg/dL 1.26 1.43*   CALCIUM mg/dL 10.4 10.1   GLUCOSE mg/dL 125* 109*       Results from last 7 days  Lab Units 10/10/17  0554   INR  1.33*     Hemoglobin A1C:  Lab Results   Component Value Date    HGBA1C 5.00 10/09/2017       Glucose Range:  Glucose   Date/Time Value Ref Range Status   10/09/2017 0606 109 70 - 130 mg/dL Final   10/08/2017 2108 146 (H) 70 - 130 mg/dL Final   10/08/2017 1607 102 70 - 130 mg/dL Final   10/08/2017 1225 119 70 - 130 mg/dL Final       Lab Results   Component Value Date    [id number] " 495 10/08/2017       Lab Results   Component Value Date    TSH 0.258 (L) 10/08/2017       Assessment & Plan      Medication Review: Yes    Active Hospital Problems (** Indicates Principal Problem)    Diagnosis Date Noted   • **Closed fracture of right hip [S72.001A] 10/08/2017   • CAD (coronary artery disease) [I25.10] 10/08/2017   • Hypertension [I10] 10/08/2017   • COPD (chronic obstructive pulmonary disease) [J44.9] 10/08/2017   • DM (diabetes mellitus), type 2 [E11.9] 10/08/2017   • Dementia [F03.90] 10/08/2017   • Atrial fibrillation [I48.91] 10/08/2017   • History of DVT (deep vein thrombosis) [Z86.718] 10/08/2017   • DNR (do not resuscitate) [Z66] 10/08/2017   • Anticoagulated on Coumadin [Z51.81, Z79.01] 10/08/2017   • Iron deficiency anemia [D50.9] 10/08/2017   • CKD (chronic kidney disease), stage III [N18.3] 10/08/2017      Resolved Hospital Problems    Diagnosis Date Noted Date Resolved   • Hyperkalemia [E87.5] 10/08/2017 10/10/2017       Assessment/Plan  1. Right femoral neck fracture. Just had surgery. Recheck hgb am. PT as able. Pain control.  2. Chronic afib- rate ok. Restart Coumadin when ok with ortho  3. DM2- sugars fine. Follow.   4. CKD3- creat better. Recheck am.  5. TSH noted- prob sick euthy. Recheck in couple weeks.  6. JANE- stable today. Expect it will drop. Recheck am.    Merry Tucker MD  10/10/17  5:26 PM

## 2017-10-10 NOTE — PLAN OF CARE
Problem: Patient Care Overview (Adult)  Goal: Plan of Care Review  Outcome: Ongoing (interventions implemented as appropriate)    10/10/17 4120   Coping/Psychosocial Response Interventions   Plan Of Care Reviewed With patient   Patient Care Overview   Progress no change   Outcome Evaluation   Outcome Summary/Follow up Plan patient went down for surgery, a right partial hip and will return to unit once stable post op. patient continues to be difficult and argumentative while refusing care. patient has baseline dementia and confusion. voids per brief. refused meds today. no evidence of learning education, will teach family when available.          Problem: Fractured Hip (Adult)  Goal: Signs and Symptoms of Listed Potential Problems Will be Absent or Manageable (Fractured Hip)  Outcome: Ongoing (interventions implemented as appropriate)    Problem: Fall Risk (Adult)  Goal: Absence of Falls  Outcome: Ongoing (interventions implemented as appropriate)    Problem: Skin Integrity Impairment, Risk/Actual (Adult)  Goal: Skin Integrity/Wound Healing  Outcome: Ongoing (interventions implemented as appropriate)

## 2017-10-11 NOTE — THERAPY EVALUATION
Acute Care - Physical Therapy Initial Evaluation  [facility]     Patient Name: Darrian Davis  : 1940  MRN: [id number]  Today's Date: 10/11/2017   Onset of Illness/Injury or Date of Surgery Date: 10/10/17     Referring Physician: Tuan      Admit Date: 10/8/2017     Visit Dx:    ICD-10-CM ICD-9-CM   1. Closed fracture of right hip, initial encounter S72.001A 820.8   2. Multiple contusions T07.XXXA 924.8   3. Fall, initial encounter W19.XXXA E888.9   4. Difficulty walking R26.2 719.7     Patient Active Problem List   Diagnosis   • Severe sepsis   • Acute on chronic renal insufficiency   • Hypernatremia   • Dehydration   • Atrial fibrillation   • Nonsustained ventricular tachycardia   • Dementia   • Closed fracture of right hip   • CAD (coronary artery disease)   • Hypertension   • COPD (chronic obstructive pulmonary disease)   • DM (diabetes mellitus), type 2   • Dementia   • Atrial fibrillation   • History of DVT (deep vein thrombosis)   • DNR (do not resuscitate)   • Anticoagulated on Coumadin   • Iron deficiency anemia   • CKD (chronic kidney disease), stage III     Past Medical History:   Diagnosis Date   • Anxiety    • Arthritis    • Atrial fibrillation    • CAD (coronary artery disease)    • Confusion    • COPD (chronic obstructive pulmonary disease)    • Dementia    • Depression    • DM (diabetes mellitus), type 2    • DVT (deep venous thrombosis)    • History of transfusion    • Hypertension    • Kidney stone    • Myocardial infarction    • PE (pulmonary thromboembolism)    • Prostate cancer    • Skin cancer    • Stroke      Past Surgical History:   Procedure Laterality Date   • BACK SURGERY     • COLONOSCOPY     • HIP SPACER INSERTION WITH ANTIBIOTIC CEMENT Right 10/10/2017    Procedure: HIP BIPOLAR CEMENTED;  Surgeon: Greg Dickerson MD;  Location: [facility] OR;  Service:    • HIP SURGERY     • PROSTATE SURGERY            PT ASSESSMENT (last 72 hours)      PT Evaluation        10/11/17 1040 10/08/17 2100    Rehab Evaluation    Document Type evaluation  -EM     Subjective Information agree to therapy;no complaints  -EM     General Information    Onset of Illness/Injury or Date of Surgery Date 10/10/17  -EM     Referring Physician Tuan  -EM     General Observations elderly  male in supine, sleeping but arousable   -EM     Pertinent History Of Current Problem R bipolar cemented hip s/p fall at NH with resultant hip fx   -EM     Precautions/Limitations anterior hip precautions- right;fall precautions  -EM     Prior Level of Function --   unsure of prior level, pt states walked with rwx   -EM     Equipment Currently Used at Home walker, rolling   per patient report  -EM other (see comments)   lives in skilled facility  -WM    Plans/Goals Discussed With patient  -EM     Living Environment    Lives With facility resident  -EM     Living Arrangements residential facility  -EM     Clinical Impression    Patient/Family Goals Statement pt unable to state, no family present  -EM     Criteria for Skilled Therapeutic Interventions Met yes;treatment indicated  -EM     Impairments Found (describe specific impairments) gait, locomotion, and balance;arousal, attention, and cognition  -EM     Rehab Potential fair, will monitor progress closely  -EM     Pain Assessment    Pain Assessment No/denies pain   denies pain at rest, grimaces with bed mob and tsf   -EM     Cognitive Assessment/Intervention    Current Cognitive/Communication Assessment impaired  -EM     Orientation Status oriented to;person;place  -EM     Follows Commands/Answers Questions 50% of the time;able to follow single-step instructions  -EM     Personal Safety moderate impairment  -EM     Personal Safety Interventions fall prevention program maintained  -EM     ROM (Range of Motion)    General ROM no range of motion deficits identified   grossly assessed  -EM     MMT (Manual Muscle Testing)    General MMT Assessment other (see  comments)   generally weak from surgery, not formally assessed  -EM     Mobility Assessment/Training    Extremity Weight-Bearing Status right lower extremity  -EM     Right Lower Extremity Weight-Bearing weight-bearing as tolerated  -EM     Bed Mobility, Assessment/Treatment    Bed Mob, Supine to Sit, [place] maximum assist (25% patient effort);2 person assist required  -EM     Transfer Assessment/Treatment    Transfers, Bed-Chair [place] maximum assist (25% patient effort);2 person assist required  -EM     Transfers, Sit-Stand [place] minimum assist (75% patient effort);2 person assist required  -EM     Transfers, Stand-Sit [place] minimum assist (75% patient effort);2 person assist required  -EM     Transfers, Sit-Stand-Sit, Assist Device rolling walker  -EM     Transfer, Comment able to stand with rwx, then could not step or pivot towards chair, maxA x2 stand pivot tsf without use of rwx   -EM     Therapy Exercises    Exercise Protocols total hip  -EM     Total Hip Exercises 10 reps;right:;with assist  -EM     Positioning and Restraints    Pre-Treatment Position in bed  -EM     Post Treatment Position chair  -EM     In Chair reclined;call light within reach;exit alarm on;notified nsg  -EM       10/08/17 2000       Living Environment    Lives With other (see comments)   Skilled Facility  -WM     Living Arrangements residential facility  -WM     Home Accessibility no concerns  -WM     Stair Railings at Home none  -WM     Type of Financial/Environmental Concern none  -WM     Transportation Available ambulance  -WM       User Key  (r) = Recorded By, (t) = Taken By, (c) = Cosigned By    Initials Name Provider Type     Aleksandar Curiel RN Registered Nurse    ADOLFO Reid PT Physical Therapist          Physical Therapy Education     Title: PT OT SLP Therapies (Active)     Topic: Physical Therapy (Active)     Point: Mobility training (Active)    Learning Progress Summary    Learner  Readiness Method Response Comment Documented by Status   Patient Acceptance E NR  EM 10/11/17 1048 Active               Point: Home exercise program (Active)    Learning Progress Summary    Learner Readiness Method Response Comment Documented by Status   Patient Acceptance E NR  EM 10/11/17 1048 Active                      User Key     Initials Effective Dates Name Provider Type Discipline    EM 12/01/15 -  Ayesha Reid, PT Physical Therapist PT                PT Recommendation and Plan  Anticipated Discharge Disposition: skilled nursing facility  Planned Therapy Interventions: bed mobility training, gait training, home exercise program, transfer training  PT Frequency: daily  Plan of Care Review  Outcome Summary/Follow up Plan: patient presents with generalized post op weakness and pain, decreased safety awareness, decreased activity tolerance which limit independence with functional mobility and patient would benefit from skilled PT. anticipate patient will d/c to SNU.           IP PT Goals       10/11/17 1049          Bed Mobility PT LTG    Bed Mobility PT LTG, Date Established 10/11/17  -EM      Bed Mobility PT LTG, Time to Achieve 3 days  -EM      Bed Mobility PT LTG, Activity Type all bed mobility  -EM      Bed Mobility PT LTG, [place] Level moderate assist (50% patient effort);2 person assist required  -EM      Transfer Training PT LTG    Transfer Training PT LTG, Date Established 10/11/17  -EM      Transfer Training PT LTG, Time to Achieve 3 days  -EM      Transfer Training PT LTG, Activity Type all transfers  -EM      Transfer Training PT LTG, [place] Level moderate assist (50% patient effort)  -EM      Gait Training PT LTG    Gait Training Goal PT LTG, Date Established 10/11/17  -EM      Gait Training Goal PT LTG, Time to Achieve 3 days  -EM      Gait Training Goal PT LTG, [place] Level moderate assist (50% patient effort);2 person assist required  -EM      Gait Training Goal PT LTG,  Assist Device walker, rolling  -EM      Gait Training Goal PT LTG, Distance to Achieve 10 feet   -EM        User Key  (r) = Recorded By, (t) = Taken By, (c) = Cosigned By    Initials Name Provider Type    ADOLFO Reid PT Physical Therapist                Outcome Measures       10/11/17 1000          How much help from another person do you currently need...    Turning from your back to your side while in flat bed without using bedrails? 2  -EM      Moving from lying on back to sitting on the side of a flat bed without bedrails? 2  -EM      Moving to and from a bed to a chair (including a wheelchair)? 2  -EM      Standing up from a chair using your arms (e.g., wheelchair, bedside chair)? 2  -EM      Climbing 3-5 steps with a railing? 1  -EM      To walk in hospital room? 1  -EM      AM-PAC 6 Clicks Score 10  -EM      Functional Assessment    Outcome Measure Options AM-PAC 6 Clicks Basic Mobility (PT)  -EM        User Key  (r) = Recorded By, (t) = Taken By, (c) = Cosigned By    Initials Name Provider Type    ADOLFO Reid PT Physical Therapist           Time Calculation:         PT Charges       10/11/17 1050          Time Calculation    Start Time 1013  -EM      Stop Time 1034  -EM      Time Calculation (min) 21 min  -EM      PT Received On 10/11/17  -EM      PT - Next Appointment 10/12/17  -EM      PT Goal Re-Cert Due Date 10/14/17  -EM        User Key  (r) = Recorded By, (t) = Taken By, (c) = Cosigned By    Initials Name Provider Type    ADOLFO Reid PT Physical Therapist          Therapy Charges for Today     Code Description Service Date Service Provider Modifiers Qty    88695940936 HC PT EVAL MOD COMPLEXITY 2 10/11/2017 Ayesha Reid, PT GP 1    98206895487 HC PT THER PROC EA 15 MIN 10/11/2017 Ayesha Reid, PT GP 1    06443506495 HC PT THER SUPP EA 15 MIN 10/11/2017 Ayesha Reid, PT GP 1          PT G-Codes  Outcome Measure Options: AM-PAC 6 Clicks Basic  Mobility (PT)      Ayesha Reid, PT  10/11/2017

## 2017-10-11 NOTE — DISCHARGE PLACEMENT REQUEST
"Alanna Davis (77 y.o. Male)     Date of Birth Social Security Number Address Home Phone MRN    1940  [id number] [address] [phone] [id number]    Christianity Marital Status          Scientologist Single       Admission Date Admission Type Admitting Provider Attending Provider Department, Room/Bed    10/8/17 Emergency [place], MD Garrett Herman Juliana, MD [address], P780/1    Discharge Date Discharge Disposition Discharge Destination                      Attending Provider: Merry Tucker MD     Allergies:  Bee Venom, Iodine, Latex, Propoxyphene    Isolation:  None   Infection:  None   Code Status:  FULL    Ht:  71\" (180.3 cm)   Wt:  190 lb (86.2 kg)    Admission Cmt:  None   Principal Problem:  Closed fracture of right hip [S72.001A]                 Active Insurance as of 10/8/2017     Primary Coverage     Payor Plan Insurance Group Employer/Plan Group    MEDICARE MEDICARE A & B      Payor Plan Address Payor Plan Phone Number Effective From Effective To    [address] [phone] 10/1/1996     [place] [address]       Subscriber Name Subscriber Birth Date Member ID       ALANNA DAVIS 1940 [id number]                 Emergency Contacts      (Rel.) Home Phone Work Phone Mobile Phone    Diana Kerr (Power of ) [phone] -- [phone]    Kimmy Kerr (Grandchild) -- -- [phone]              "

## 2017-10-11 NOTE — DISCHARGE PLACEMENT REQUEST
"Alanna Davis (77 y.o. Male)     Date of Birth Social Security Number Address Home Phone MRN    1940  [address] [facility] [address] [phone] [id number]    Jewish Marital Status          Holiness Single       Admission Date Admission Type Admitting Provider Attending Provider Department, Room/Bed    10/8/17 Emergency [place], MD Garrett Herman Juliana, MD [address], P780/1    Discharge Date Discharge Disposition Discharge Destination                      Attending Provider: Merry Tucker MD     Allergies:  Bee Venom, Iodine, Latex, Propoxyphene    Isolation:  None   Infection:  None   Code Status:  FULL    Ht:  71\" (180.3 cm)   Wt:  190 lb (86.2 kg)    Admission Cmt:  None   Principal Problem:  Closed fracture of right hip [S72.001A]                 Active Insurance as of 10/8/2017     Primary Coverage     Payor Plan Insurance Group Employer/Plan Group    MEDICARE MEDICARE A & B      Payor Plan Address Payor Plan Phone Number Effective From Effective To    [address] [phone] 10/1/1996     [place] [address]       Subscriber Name Subscriber Birth Date Member ID       ALANNA DAVIS 1940 [id number]                 Emergency Contacts      (Rel.) Home Phone Work Phone Mobile Phone    Diana Kerr (Power of ) [phone] -- [phone]    Kimmy Kerr (Grandchild) -- -- [phone]              "

## 2017-10-11 NOTE — PROGRESS NOTES
Discharge Planning Assessment  [facility]     Patient Name: Darrian Davis  MRN: [id number]  Today's Date: 10/11/2017    Admit Date: 10/8/2017          Discharge Needs Assessment       10/11/17 1537    Living Environment    Lives With facility resident    Living Arrangements residential facility    Home Accessibility no concerns    Stair Railings at Home none    Type of Financial/Environmental Concern none    Transportation Available ambulance    Living Environment    Quality Of Family Relationships involved    Discharge Needs Assessment    Discharge Facility/Level Of Care Needs nursing facility, basic;nursing facility, skilled            Discharge Plan       10/11/17 1542    Case Management/Social Work Plan    Plan Return to [facility] dementia unit w/ in house therapy.     Additional Comments S/w pt's POA, Diana [phone], she would like the pt to return to [facility] w/ in house therapy there. Pt lives in a locked down dementia unit w/ 24 hour supervision. [facility] and therapy will be provided.           Discharge Placement     Facility/Agency Request Status Selected? Address Phone Number Fax Number    [facility] Accepted    Yes [address] [id number] [phone]    [facility] Declined     [address] [id number] [phone]                Demographic Summary     None            Functional Status       10/11/17 1538    Functional Status Current    Ambulation 4-->completely dependent    Transferring 4-->completely dependent    Toileting 4-->completely dependent    Bathing 2-->assistive person    Dressing 2-->assistive person    Eating 2-->assistive person    Communication 2-->difficulty understanding (not related to language barrier)    Swallowing (if score 2 or more for any item, consult Rehab Services) 0-->swallows foods/liquids without difficulty    Change in  Functional Status Since Onset of Current Illness/Injury yes    Functional Status Prior    Ambulation 1-->assistive equipment    Transferring 1-->assistive equipment    Toileting 3-->assistive equipment and person    Bathing 2-->assistive person    Dressing 2-->assistive person    Eating 0-->independent    Communication 2-->difficulty understanding (not related to language barrier)    Swallowing 0-->swallows foods/liquids without difficulty    IADL    Medications assistive person    Meal Preparation completely dependent    Housekeeping completely dependent    Laundry completely dependent    Shopping completely dependent    Oral Care assistive person    Activity Tolerance    Usual Activity Tolerance fair    Current Activity Tolerance fair    Cognitive/Perceptual/Developmental    Current Mental Status/Cognitive Functioning lacks insight into situation;unable to assess   s/w pt's POA     Recent Changes in Mental Status/Cognitive Functioning unable to assess            Psychosocial     None            Abuse/Neglect     None            Legal     None            Substance Abuse     None            Patient Forms       10/11/17 1542    Patient Forms    Provider Choice List Delivered    Delivered to Support person    Method of delivery Telephone          Shayna Castellano RN

## 2017-10-11 NOTE — PROGRESS NOTES
"     LOS: 3 days   Primary Care Physician: Narciso Ma MD     Subjective   Awake and calm.  Confused.  Was getting ready to be fed lunch.  No complaints.    Vital Signs  Body mass index is 26.5 kg/(m^2).  Temp:  [97 °F (36.1 °C)-99.2 °F (37.3 °C)] 97.7 °F (36.5 °C)  Heart Rate:  [] 91  Resp:  [16-20] 16  BP: (104-173)/(67-95) 134/77      Objective:  General Appearance:  In no acute distress (Frail and elderly).    Vital signs: (most recent): Blood pressure 134/77, pulse 91, temperature 97.7 °F (36.5 °C), temperature source Oral, resp. rate 16, height 71\" (180.3 cm), weight 190 lb (86.2 kg), SpO2 100 %.    Lungs:  There are rhonchi and decreased breath sounds.  No wheezes or rales.  (Occasional rhonchi)  Heart: Normal rate.  Irregular rhythm.  No murmur.   Abdomen: Abdomen is soft and non-distended.  Bowel sounds are normal.   There is no abdominal tenderness.   There is no splenomegaly. There is no hepatomegaly.   Extremities: There is no dependent edema.    Neurological: Patient is alert.          Results Review:    I reviewed the patient's new clinical results.      Results from last 7 days  Lab Units 10/11/17  0426 10/10/17  0554   WBC 10*3/mm3 6.12 7.80   HEMOGLOBIN g/dL 8.4* 9.4*   PLATELETS 10*3/mm3 142 181       Results from last 7 days  Lab Units 10/11/17  0426 10/10/17  0554   SODIUM mmol/L 143 146*   POTASSIUM mmol/L 4.1 4.6   CHLORIDE mmol/L 109* 107   CO2 mmol/L 22.4 23.6   BUN mg/dL 31* 23   CREATININE mg/dL 1.40* 1.26   CALCIUM mg/dL 10.0 10.4   GLUCOSE mg/dL 127* 125*       Results from last 7 days  Lab Units 10/11/17  0426   INR  1.47*     Hemoglobin A1C:  Lab Results   Component Value Date    HGBA1C 5.00 10/09/2017       Glucose Range:  Glucose   Date/Time Value Ref Range Status   10/11/2017 1045 148 (H) 70 - 130 mg/dL Final   10/11/2017 0559 132 (H) 70 - 130 mg/dL Final   10/10/2017 2128 143 (H) 70 - 130 mg/dL Final   10/09/2017 0606 109 70 - 130 mg/dL Final   10/08/2017 2108 146 (H) 70 " - 130 mg/dL Final   10/08/2017 1607 102 70 - 130 mg/dL Final       Lab Results   Component Value Date    XBULUSFG42 495 10/08/2017       Lab Results   Component Value Date    TSH 0.258 (L) 10/08/2017       Assessment & Plan      Medication Review: Yes    Active Hospital Problems (** Indicates Principal Problem)    Diagnosis Date Noted   • **Closed fracture of right hip [S72.001A] 10/08/2017   • CAD (coronary artery disease) [I25.10] 10/08/2017   • Hypertension [I10] 10/08/2017   • COPD (chronic obstructive pulmonary disease) [J44.9] 10/08/2017   • DM (diabetes mellitus), type 2 [E11.9] 10/08/2017   • Dementia [F03.90] 10/08/2017   • Atrial fibrillation [I48.91] 10/08/2017   • History of DVT (deep vein thrombosis) [Z86.718] 10/08/2017   • DNR (do not resuscitate) [Z66] 10/08/2017   • Anticoagulated on Coumadin [Z51.81, Z79.01] 10/08/2017   • Iron deficiency anemia [D50.9] 10/08/2017   • CKD (chronic kidney disease), stage III [N18.3] 10/08/2017      Resolved Hospital Problems    Diagnosis Date Noted Date Resolved   • Hyperkalemia [E87.5] 10/08/2017 10/10/2017       Assessment/Plan  1.  Postop day 1 right femoral neck fracture.  Expected acute blood loss anemia noted. History of iron deficiency anemia as well.  Recheck in a.m.  Continue PT and pain medications as needed.  2.  Chronic A. fib.  Restart Coumadin today at 2.5 mg daily.  Recheck INR in a.m.  3.  Diabetes mellitus type 2 sugars are fine.  4.  Chronic kidney disease stage III, creatinine noted.  5.  Probable sick euthyroid.  Free T4 is pending  6.  Disposition.  Tentative transfer to subacute rehabilitation tomorrow    Merry Tucker MD  10/11/17  12:41 PM

## 2017-10-11 NOTE — PLAN OF CARE
Problem: Patient Care Overview (Adult)  Goal: Plan of Care Review    10/11/17 1049   Outcome Evaluation   Outcome Summary/Follow up Plan patient presents with generalized post op weakness and pain, decreased safety awareness, decreased activity tolerance which limit independence with functional mobility and patient would benefit from skilled PT. anticipate patient will d/c to SNU.          Problem: Inpatient Physical Therapy  Goal: Bed Mobility Goal LTG- PT    10/11/17 1049   Bed Mobility PT LTG   Bed Mobility PT LTG, Date Established 10/11/17   Bed Mobility PT LTG, Time to Achieve 3 days   Bed Mobility PT LTG, Activity Type all bed mobility   Bed Mobility PT LTG, [place] Level moderate assist (50% patient effort);2 person assist required       Goal: Transfer Training Goal 1 LTG- PT    10/11/17 1049   Transfer Training PT LTG   Transfer Training PT LTG, Date Established 10/11/17   Transfer Training PT LTG, Time to Achieve 3 days   Transfer Training PT LTG, Activity Type all transfers   Transfer Training PT LTG, [place] Level moderate assist (50% patient effort)       Goal: Gait Training Goal LTG- PT    10/11/17 1049   Gait Training PT LTG   Gait Training Goal PT LTG, Date Established 10/11/17   Gait Training Goal PT LTG, Time to Achieve 3 days   Gait Training Goal PT LTG, [place] Level moderate assist (50% patient effort);2 person assist required   Gait Training Goal PT LTG, Assist Device walker, rolling   Gait Training Goal PT LTG, Distance to Achieve 10 feet

## 2017-10-11 NOTE — PLAN OF CARE
Problem: Patient Care Overview (Adult)  Goal: Plan of Care Review  Outcome: Ongoing (interventions implemented as appropriate)    10/11/17 6033   Coping/Psychosocial Response Interventions   Plan Of Care Reviewed With patient;daughter   Patient Care Overview   Progress improving   Outcome Evaluation   Outcome Summary/Follow up Plan VSS. Pt denies pain and has score of 0 for CPOT. Pt calm and cooperative this shift. Up to chair this shift with assist x2. Voiding per brief. Plans to d/c to rehab. Discussed BP monitoring and medication r/t HTN, needs reinforcement. Dtr at bedside with pt most of day, discussed meds and she voiced understanding.          Problem: Fractured Hip (Adult)  Goal: Signs and Symptoms of Listed Potential Problems Will be Absent or Manageable (Fractured Hip)  Outcome: Outcome(s) achieved Date Met:  10/11/17    Problem: Fall Risk (Adult)  Goal: Absence of Falls  Outcome: Ongoing (interventions implemented as appropriate)    Problem: Skin Integrity Impairment, Risk/Actual (Adult)  Goal: Skin Integrity/Wound Healing  Outcome: Ongoing (interventions implemented as appropriate)    Problem: Perioperative Period (Adult)  Goal: Signs and Symptoms of Listed Potential Problems Will be Absent or Manageable (Perioperative Period)  Outcome: Ongoing (interventions implemented as appropriate)

## 2017-10-11 NOTE — PROGRESS NOTES
Orthopedic Progress Note      Patient: Darrian Davis  YOB: 1940     Date of Admission: 10/8/2017  5:40 AM Medical Record Number: [id number]     Attending Physician: Merry Tucker MD    Status Post:  Right  HIP BIPOLAR CEMENTED Post Operative Day Number: 1    Subjective : No new orthopaedic complaints     Pain Relief: some relief with present medication.     Systemic Complaints: No Complaints  Vitals:    10/10/17 2055 10/10/17 2300 10/11/17 0300 10/11/17 0723   BP: 118/80 122/78 108/67 134/89   BP Location: Right arm Right arm Right arm Right arm   Patient Position: Lying Lying Lying Lying   Pulse: 90 88 76 94   Resp: 20 16 16 16   Temp: 98.4 °F (36.9 °C) 97.7 °F (36.5 °C) 98.2 °F (36.8 °C) 97 °F (36.1 °C)   TempSrc: Oral Oral Oral Oral   SpO2: 100% 94% 99% 100%   Weight:       Height:           Physical Exam: 77 y.o. male    General Appearance:       Alert, cooperative, in no acute distress                  Extremities:    Dressing Clean, Dry and Intact         Incision healthy without signs or symptoms of infections         No clinical sign of DVT        Able to do good movements of digits    Pulses:   Pulses palpable and equal bilaterally           Diagnostic Tests:       Results from last 7 days  Lab Units 10/11/17  0426 10/10/17  0554 10/09/17  0352   WBC 10*3/mm3 6.12 7.80 7.11   HEMOGLOBIN g/dL 8.4* 9.4* 9.2*   HEMATOCRIT % 29.0* 32.8* 32.8*   PLATELETS 10*3/mm3 142 181 168       Results from last 7 days  Lab Units 10/11/17  0426 10/10/17  0554 10/09/17  0352   SODIUM mmol/L 143 146* 144   POTASSIUM mmol/L 4.1 4.6 4.6   CHLORIDE mmol/L 109* 107 109*   CO2 mmol/L 22.4 23.6 23.6   BUN mg/dL 31* 23 19   CREATININE mg/dL 1.40* 1.26 1.43*   GLUCOSE mg/dL 127* 125* 109*   CALCIUM mg/dL 10.0 10.4 10.1       Results from last 7 days  Lab Units 10/11/17  0426 10/10/17  0554 10/09/17  0352   INR  1.47* 1.33* 1.31*     No results found for: CRYSTAL]  No results found for: CULTURE]  No results found for:  URICACID]  Xr Hand 3+ View Right    Result Date: 10/8/2017  Narrative: RIGHT HAND RADIOGRAPHS  HISTORY: 77-year-old male with a history of right hand pain status post fall.  FINDINGS: PA and lateral radiographs of the right hand were obtained and demonstrate diffuse osteopenia. There is evidence of plate and screw fixation of the radius with the carpal bones and 3rd metacarpal bone. Alignment appears appropriate. Joint space narrowing of the DIP joint of the 2nd digit and MCP joint of the 1st digit is noted.      Impression: There is no evidence for an acute abnormality of the right hand. Plate and screw fixation of the distal forearm, wrist and 2nd metacarpal is noted.  This report was finalized on 10/8/2017 5:18 PM by Dr. Rhett Hdz MD.      Ct Head Without Contrast    Result Date: 10/9/2017  Narrative: HISTORY: Fell, possible head injury. Confusion. Neck pain.  CT OF THE BRAIN WITHOUT CONTRAST 10/08/2017  TECHNIQUE: Axial images were obtained through the brain without intravenous contrast.  COMPARISON: Previous study dated 08/12/2017 is compared.  FINDINGS:  There is moderate diffuse atrophy. There is evidence of previous left frontal craniotomy. There is decreased attenuation of the periventricular white matter bilaterally consistent with small vessel white matter ischemic disease.  There is no evidence of acute infarction, hemorrhage, midline shift or mass effect.  No skull fractures are seen.      Impression: 1. Evidence of previous left frontal craniotomy, atrophy and small vessel white matter ischemic disease. 2. No acute intracranial process identified.   CT OF THE CERVICAL SPINE WITHOUT CONTRAST 10/08/2017  TECHNIQUE: Axial images were obtained from the skull base to the upper thoracic spine. Sagittal and coronal reconstruction images were reviewed.  There is degenerative change of the C1-C2 articulation.  There is C3-4, C4-5, C5-6, C6-7 and C7-T1 spondylosis.  Bilateral carotid artery calcification.   IMPRESSION: 1. Cervical degenerative disease as described. 2. No cervical spine fractures are seen. No significant subluxation is seen.  Radiation dose reduction techniques were utilized, including automated exposure control and exposure modulation based on body size.  This report was finalized on 10/9/2017 4:04 PM by Dr. Juvencio Stark MD.      Ct Cervical Spine Without Contrast    Result Date: 10/9/2017  Narrative: HISTORY: Fell, possible head injury. Confusion. Neck pain.  CT OF THE BRAIN WITHOUT CONTRAST 10/08/2017  TECHNIQUE: Axial images were obtained through the brain without intravenous contrast.  COMPARISON: Previous study dated 08/12/2017 is compared.  FINDINGS:  There is moderate diffuse atrophy. There is evidence of previous left frontal craniotomy. There is decreased attenuation of the periventricular white matter bilaterally consistent with small vessel white matter ischemic disease.  There is no evidence of acute infarction, hemorrhage, midline shift or mass effect.  No skull fractures are seen.      Impression: 1. Evidence of previous left frontal craniotomy, atrophy and small vessel white matter ischemic disease. 2. No acute intracranial process identified.   CT OF THE CERVICAL SPINE WITHOUT CONTRAST 10/08/2017  TECHNIQUE: Axial images were obtained from the skull base to the upper thoracic spine. Sagittal and coronal reconstruction images were reviewed.  There is degenerative change of the C1-C2 articulation.  There is C3-4, C4-5, C5-6, C6-7 and C7-T1 spondylosis.  Bilateral carotid artery calcification.  IMPRESSION: 1. Cervical degenerative disease as described. 2. No cervical spine fractures are seen. No significant subluxation is seen.  Radiation dose reduction techniques were utilized, including automated exposure control and exposure modulation based on body size.  This report was finalized on 10/9/2017 4:04 PM by Dr. Juvencio Stark MD.      Xr Chest 1 View    Result Date:  10/9/2017  Narrative: HISTORY: Fell, right hip pain.  AP PELVIS  FINDINGS:  2 views of the pelvis show a minimally displaced fracture of the right femoral neck. No other fractures of the pelvis are seen. Proximal left femur appears intact.      Impression: Right femoral neck fracture.  AP CHEST  FINDINGS:  Heart size is mildly enlarged. Lungs are slightly underinflated with some vascular crowding. There is some bilateral vascular congestion. There may be some minimal interstitial edema as the interstitial markings appear somewhat prominent. There also may be a tiny amount of fluid in the minor fissure on the right. There is some calcification of the mitral valve annulus.  IMPRESSION: 1. Mild vascular congestion and suggestive mild interstitial edema somewhat more prominent than on 08/12/2017. This finding is somewhat exaggerated by suboptimal inspiration. 2. Mild cardiomegaly.  This report was finalized on 10/9/2017 4:05 PM by Dr. Juvencio Stark MD.      Xr Pelvis 1 Or 2 View    Result Date: 10/9/2017  Narrative: HISTORY: Fell, right hip pain.  AP PELVIS  FINDINGS:  2 views of the pelvis show a minimally displaced fracture of the right femoral neck. No other fractures of the pelvis are seen. Proximal left femur appears intact.      Impression: Right femoral neck fracture.  AP CHEST  FINDINGS:  Heart size is mildly enlarged. Lungs are slightly underinflated with some vascular crowding. There is some bilateral vascular congestion. There may be some minimal interstitial edema as the interstitial markings appear somewhat prominent. There also may be a tiny amount of fluid in the minor fissure on the right. There is some calcification of the mitral valve annulus.  IMPRESSION: 1. Mild vascular congestion and suggestive mild interstitial edema somewhat more prominent than on 08/12/2017. This finding is somewhat exaggerated by suboptimal inspiration. 2. Mild cardiomegaly.  This report was finalized on 10/9/2017 4:05 PM  by Dr. Juvencio Stark MD.      Xr Hip With Or Without Pelvis 1 View Right    Result Date: 10/10/2017  Narrative: RIGHT HIP WITH PELVIS 2 VIEWS  HISTORY: 77-year-old male postop right partial hip replacement  COMPARISON: 10/08/2017  FINDINGS: 1. Satisfactory appearance following placement of prosthesis, no other change in appearance of bony pelvis. 2. Imaging is limited by osteopenia and considerable overlying bowel content.  This report was finalized on 10/10/2017 6:06 PM by Dr. Horacio Ang MD.          Current Medications:  Scheduled Meds:  aspirin 81 mg Oral Daily   Divalproex Sodium 250 mg Oral TID   enoxaparin 40 mg Subcutaneous Daily   ferrous sulfate 325 mg Oral BID   gabapentin 300 mg Oral TID   metoprolol tartrate 25 mg Oral BID   multivitamin with minerals 1 tablet Oral Daily   traZODone 100 mg Oral Nightly     Continuous Infusions:  sodium chloride 50 mL/hr Last Rate: 50 mL/hr (10/10/17 4925)     PRN Meds:.•  acetaminophen  •  bisacodyl  •  dextrose  •  dextrose  •  docusate sodium  •  glucagon (human recombinant)  •  HYDROcodone-acetaminophen  •  LORazepam  •  Morphine **AND** naloxone  •  nitroglycerin  •  sodium chloride    Assessment: Status post  HIP BIPOLAR CEMENTED    Patient Active Problem List   Diagnosis   • Severe sepsis   • Acute on chronic renal insufficiency   • Hypernatremia   • Dehydration   • Atrial fibrillation   • Nonsustained ventricular tachycardia   • Dementia   • Closed fracture of right hip   • CAD (coronary artery disease)   • Hypertension   • COPD (chronic obstructive pulmonary disease)   • DM (diabetes mellitus), type 2   • Dementia   • Atrial fibrillation   • History of DVT (deep vein thrombosis)   • DNR (do not resuscitate)   • Anticoagulated on Coumadin   • Iron deficiency anemia   • CKD (chronic kidney disease), stage III       PLAN:   Continues current post-op course  Anticoagulation: Lovenox started , OK to start home dose of coumadin if OK with [facility]HA  Dressing Change in  am  Mobilize with PT as tolerated per protocol    Weight Bearing: WBAT  Discharge Plan: OK to plan for discharge in  tomorrow to SNF  from orthopadic perspective.    Greg Dickerson MD    Date: 10/11/2017    Time: 8:01 AM

## 2017-10-11 NOTE — PLAN OF CARE
Problem: Patient Care Overview (Adult)  Goal: Plan of Care Review  Outcome: Ongoing (interventions implemented as appropriate)    10/11/17 0309   Coping/Psychosocial Response Interventions   Plan Of Care Reviewed With patient   Patient Care Overview   Progress improving   Outcome Evaluation   Outcome Summary/Follow up Plan HTN well controlled on po meds. pt with confusion not new. pt restless and pulling on post-op dressing.          Problem: Fall Risk (Adult)  Goal: Absence of Falls  Outcome: Ongoing (interventions implemented as appropriate)    10/11/17 0309   Fall Risk (Adult)   Absence of Falls making progress toward outcome

## 2017-10-12 NOTE — PROGRESS NOTES
Continued Stay Note  [facility]     Patient Name: Darrian Davis  MRN: [id number]  Today's Date: 10/12/2017    Admit Date: 10/8/2017          Discharge Plan       10/12/17 1527    Case Management/Social Work Plan    Plan [facility] vs. SNF     Additional Comments S/w pt's daughter referral sent to [facility].               Discharge Codes     None            Shayna Castellano RN

## 2017-10-12 NOTE — PLAN OF CARE
Problem: Patient Care Overview (Adult)  Goal: Plan of Care Review  Outcome: Ongoing (interventions implemented as appropriate)    10/12/17 3297   Coping/Psychosocial Response Interventions   Plan Of Care Reviewed With patient   Patient Care Overview   Progress progress toward functional goals is gradual   Outcome Evaluation   Outcome Summary/Follow up Plan patient more guarded with ther ex attempts today, leaning heavily to L in sitting. able to achieve full standing but could not maintain, sat abruptly back on edge of bed.

## 2017-10-12 NOTE — THERAPY TREATMENT NOTE
"Acute Care - Physical Therapy Treatment Note  [facility]     Patient Name: Darrian Davis  : 1940  MRN: [id number]  Today's Date: 10/12/2017  Onset of Illness/Injury or Date of Surgery Date: 10/10/17     Referring Physician: Tuan    Admit Date: 10/8/2017    Visit Dx:    ICD-10-CM ICD-9-CM   1. Closed fracture of right hip, initial encounter S72.001A 820.8   2. Multiple contusions T07.XXXA 924.8   3. Fall, initial encounter W19.XXXA E888.9   4. Difficulty walking R26.2 719.7     Patient Active Problem List   Diagnosis   • Severe sepsis   • Acute on chronic renal insufficiency   • Hypernatremia   • Dehydration   • Atrial fibrillation   • Nonsustained ventricular tachycardia   • Dementia   • Closed fracture of right hip   • CAD (coronary artery disease)   • Hypertension   • COPD (chronic obstructive pulmonary disease)   • DM (diabetes mellitus), type 2   • Dementia   • Atrial fibrillation   • History of DVT (deep vein thrombosis)   • DNR (do not resuscitate)   • Anticoagulated on Coumadin   • Iron deficiency anemia   • CKD (chronic kidney disease), stage III               Adult Rehabilitation Note       10/12/17 1649          Rehab Assessment/Intervention    Discipline physical therapist  -EM      Document Type therapy note (daily note)  -EM      Subjective Information agree to therapy   agreeable at first and then states \"no, no, no\" continuously  -EM      Precautions/Limitations anterior hip precautions- right  -EM      Recorded by [EM] Ayesha Reid, PT      Pain Assessment    Pain Assessment Unable to assess   pt unable to give pain number, seems to have more pain today  -EM      Recorded by [EM] Ayesha Reid, PT      Bed Mobility, Assessment/Treatment    Bed Mob, Supine to Sit, [place] maximum assist (25% patient effort);2 person assist required  -EM      Bed Mob, Sit to Supine, [place] maximum assist (25% patient effort);2 person assist required  -EM      Recorded by [EM] " Ayesha Reid, PT      Transfer Assessment/Treatment    Transfers, Sit-Stand [place] moderate assist (50% patient effort);2 person assist required  -EM      Transfers, Stand-Sit [place] moderate assist (50% patient effort);2 person assist required  -EM      Transfers, Sit-Stand-Sit, Assist Device rolling walker  -EM      Transfer, Comment more resistive to standing today, cues for hand placement but kept moving hand to lower portion of rwx, pt achieved full standing but sat abruptly on both attempts   -EM      Recorded by [EM] Ayesha Reid, PT      Therapy Exercises    Exercise Protocols total hip  -EM      Total Hip Exercises right:;10 reps;with assist   increased guarding with ex attempts, unable to do isometrics  -EM      Recorded by [EM] Ayesha Reid, PT      Positioning and Restraints    Pre-Treatment Position in bed  -EM      Post Treatment Position bed  -EM      In Bed supine;call light within reach;exit alarm on;notified nsg  -EM      Recorded by [EM] Ayesha Reid, PT        User Key  (r) = Recorded By, (t) = Taken By, (c) = Cosigned By    Initials Name Effective Dates    EM Ayesha Reid, PT 12/01/15 -                 IP PT Goals       10/11/17 1049          Bed Mobility PT LTG    Bed Mobility PT LTG, Date Established 10/11/17  -EM      Bed Mobility PT LTG, Time to Achieve 3 days  -EM      Bed Mobility PT LTG, Activity Type all bed mobility  -EM      Bed Mobility PT LTG, [place] Level moderate assist (50% patient effort);2 person assist required  -EM      Transfer Training PT LTG    Transfer Training PT LTG, Date Established 10/11/17  -EM      Transfer Training PT LTG, Time to Achieve 3 days  -EM      Transfer Training PT LTG, Activity Type all transfers  -EM      Transfer Training PT LTG, [place] Level moderate assist (50% patient effort)  -EM      Gait Training PT LTG    Gait Training Goal PT LTG, Date Established 10/11/17  -EM      Gait Training Goal  PT LTG, Time to Achieve 3 days  -EM      Gait Training Goal PT LTG, [place] Level moderate assist (50% patient effort);2 person assist required  -EM      Gait Training Goal PT LTG, Assist Device walker, rolling  -EM      Gait Training Goal PT LTG, Distance to Achieve 10 feet   -EM        User Key  (r) = Recorded By, (t) = Taken By, (c) = Cosigned By    Initials Name Provider Type    EM Ayesha Reid, PT Physical Therapist          Physical Therapy Education     Title: PT OT SLP Therapies (Active)     Topic: Physical Therapy (Active)     Point: Mobility training (Active)    Learning Progress Summary    Learner Readiness Method Response Comment Documented by Status   Patient Acceptance E NR  EM 10/12/17 1653 Active    Acceptance E NR  EM 10/11/17 1048 Active               Point: Home exercise program (Active)    Learning Progress Summary    Learner Readiness Method Response Comment Documented by Status   Patient Acceptance E NR  EM 10/11/17 1048 Active                      User Key     Initials Effective Dates Name Provider Type Discipline    EM 12/01/15 -  Ayesha Reid, PT Physical Therapist PT                    PT Recommendation and Plan  Anticipated Discharge Disposition: skilled nursing facility  Planned Therapy Interventions: bed mobility training, gait training, home exercise program, transfer training  PT Frequency: daily  Plan of Care Review  Plan Of Care Reviewed With: patient  Progress: progress toward functional goals is gradual  Outcome Summary/Follow up Plan: patient more guarded with ther ex attempts today, leaning heavily to L in sitting. able to achieve full standing but could not maintain, sat abruptly back on edge of bed.           Outcome Measures       10/12/17 1600 10/11/17 1000       How much help from another person do you currently need...    Turning from your back to your side while in flat bed without using bedrails? 2  -EM 2  -EM     Moving from lying on back to sitting on  the side of a flat bed without bedrails? 2  -EM 2  -EM     Moving to and from a bed to a chair (including a wheelchair)? 2  -EM 2  -EM     Standing up from a chair using your arms (e.g., wheelchair, bedside chair)? 2  -EM 2  -EM     Climbing 3-5 steps with a railing? 1  -EM 1  -EM     To walk in hospital room? 1  -EM 1  -EM     AM-PAC 6 Clicks Score 10  -EM 10  -EM     Functional Assessment    Outcome Measure Options  AM-PAC 6 Clicks Basic Mobility (PT)  -EM       User Key  (r) = Recorded By, (t) = Taken By, (c) = Cosigned By    Initials Name Provider Type    EM Ayesha Reid PT Physical Therapist           Time Calculation:         PT Charges       10/12/17 1655          Time Calculation    Start Time 1628  -EM      Stop Time 1645  -EM      Time Calculation (min) 17 min  -EM      PT Received On 10/12/17  -EM      PT - Next Appointment 10/13/17  -EM        User Key  (r) = Recorded By, (t) = Taken By, (c) = Cosigned By    Initials Name Provider Type    EM yAesha Reid PT Physical Therapist          Therapy Charges for Today     Code Description Service Date Service Provider Modifiers Qty    16654947153 HC PT EVAL MOD COMPLEXITY 2 10/11/2017 Ayesha Reid, PT GP 1    68534397480 HC PT THER PROC EA 15 MIN 10/11/2017 Ayesha Reid, PT GP 1    93018324559 HC PT THER SUPP EA 15 MIN 10/11/2017 Ayesha Reid, PT GP 1    87744779470 HC PT THER PROC EA 15 MIN 10/12/2017 Ayesha Reid, PT GP 1    24050038069 HC PT THER SUPP EA 15 MIN 10/12/2017 Ayesha Reid, PT GP 1          PT G-Codes  Outcome Measure Options: AM-PAC 6 Clicks Basic Mobility (PT)    Ayesha Reid PT  10/12/2017

## 2017-10-12 NOTE — PROGRESS NOTES
"     LOS: 4 days   Primary Care Physician: Narciso Ma MD     Subjective   No c/os. Watching football on TV.  Pleasant and cooperative.  Calm    Vital Signs  Body mass index is 26.5 kg/(m^2).  Temp:  [96.6 °F (35.9 °C)-98.1 °F (36.7 °C)] 98 °F (36.7 °C)  Heart Rate:  [] 91  Resp:  [16-20] 20  BP: (120-157)/(64-90) 137/90      Objective:  General Appearance:  In no acute distress.    Vital signs: (most recent): Blood pressure 137/90, pulse 91, temperature 98 °F (36.7 °C), temperature source Oral, resp. rate 20, height 71\" (180.3 cm), weight 190 lb (86.2 kg), SpO2 93 %.    Lungs:  There are decreased breath sounds.  No wheezes, rales or rhonchi.    Heart: Normal rate.  Irregular rhythm.  No murmur.   Abdomen: Abdomen is soft and non-distended.  Bowel sounds are normal.   There is no abdominal tenderness.   There is no splenomegaly. There is no hepatomegaly.   Extremities: There is no dependent edema.    Neurological: Patient is alert.          Results Review:    I reviewed the patient's new clinical results.      Results from last 7 days  Lab Units 10/12/17  0431 10/11/17  0426   WBC 10*3/mm3 6.65 6.12   HEMOGLOBIN g/dL 8.4* 8.4*   PLATELETS 10*3/mm3 157 142       Results from last 7 days  Lab Units 10/12/17  0431 10/11/17  0426   SODIUM mmol/L 143 143   POTASSIUM mmol/L 4.1 4.1   CHLORIDE mmol/L 109* 109*   CO2 mmol/L 22.8 22.4   BUN mg/dL 33* 31*   CREATININE mg/dL 1.37* 1.40*   CALCIUM mg/dL 9.8 10.0   GLUCOSE mg/dL 147* 127*       Results from last 7 days  Lab Units 10/12/17  0431   INR  1.36*     Hemoglobin A1C:  Lab Results   Component Value Date    HGBA1C 5.00 10/09/2017       Glucose Range:  Glucose   Date/Time Value Ref Range Status   10/12/2017 1103 146 (H) 70 - 130 mg/dL Final   10/12/2017 0605 132 (H) 70 - 130 mg/dL Final   10/11/2017 2133 198 (H) 70 - 130 mg/dL Final   10/11/2017 1646 157 (H) 70 - 130 mg/dL Final   10/11/2017 1045 148 (H) 70 - 130 mg/dL Final   10/11/2017 0559 132 (H) 70 - 130 " mg/dL Final       Lab Results   Component Value Date    IEYRLGBW88 495 10/08/2017       Lab Results   Component Value Date    TSH 0.258 (L) 10/08/2017       Assessment & Plan      Medication Review: Yes    Active Hospital Problems (** Indicates Principal Problem)    Diagnosis Date Noted   • **Closed fracture of right hip [S72.001A] 10/08/2017   • CAD (coronary artery disease) [I25.10] 10/08/2017   • Hypertension [I10] 10/08/2017   • COPD (chronic obstructive pulmonary disease) [J44.9] 10/08/2017   • DM (diabetes mellitus), type 2 [E11.9] 10/08/2017   • Dementia [F03.90] 10/08/2017   • Atrial fibrillation [I48.91] 10/08/2017   • History of DVT (deep vein thrombosis) [Z86.718] 10/08/2017   • DNR (do not resuscitate) [Z66] 10/08/2017   • Anticoagulated on Coumadin [Z51.81, Z79.01] 10/08/2017   • Iron deficiency anemia [D50.9] 10/08/2017   • CKD (chronic kidney disease), stage III [N18.3] 10/08/2017      Resolved Hospital Problems    Diagnosis Date Noted Date Resolved   • Hyperkalemia [E87.5] 10/08/2017 10/10/2017       Assessment/Plan  1. POD 2 right femoral neck fracture. Seems to be doing ok. Await arrangements for dispo.  2. CAF- increase dose to 4mg. Recheck INR am.   3. FM2- sugars fine. Dc accuchecks as not requiring any treatment.  4.  CK D3, stable  5.  Sick euthyroid.  Free T4 is normal.    Merry Tucker MD  10/12/17  3:49 PM

## 2017-10-12 NOTE — PLAN OF CARE
Problem: Patient Care Overview (Adult)  Goal: Plan of Care Review  Outcome: Ongoing (interventions implemented as appropriate)    10/12/17 0209   Coping/Psychosocial Response Interventions   Plan Of Care Reviewed With patient   Patient Care Overview   Progress improving   Outcome Evaluation   Outcome Summary/Follow up Plan HTN well controlled on po meds. pain well controlled. pt confused and refuses IS .         Problem: Fall Risk (Adult)  Goal: Absence of Falls  Outcome: Ongoing (interventions implemented as appropriate)    10/12/17 0209   Fall Risk (Adult)   Absence of Falls making progress toward outcome

## 2017-10-12 NOTE — DISCHARGE PLACEMENT REQUEST
"Alanna Davis (77 y.o. Male)     Date of Birth Social Security Number Address Home Phone MRN    1940  [id number] [facility] [address] [phone] [id number]    [place] Marital Status          Mu-ism Single       Admission Date Admission Type Admitting Provider Attending Provider Department, Room/Bed    10/8/17 [facility] [employer], MD Garrett Herman Juliana, MD [address], P780/1    Discharge Date Discharge Disposition Discharge Destination                      Attending Provider: Merry Tucker MD     Allergies:  Bee Venom, Iodine, Latex, Propoxyphene    Isolation:  None   Infection:  None   Code Status:  FULL    Ht:  71\" (180.3 cm)   Wt:  190 lb (86.2 kg)    Admission Cmt:  None   Principal Problem:  Closed fracture of right hip [S72.001A]                 Active Insurance as of 10/8/2017     Primary Coverage     Payor Plan Insurance Group Employer/Plan Group    MEDICARE MEDICARE A & B      Payor Plan Address Payor Plan Phone Number Effective From Effective To    [address] [phone] 10/1/1996     [place] [address]       Subscriber Name Subscriber Birth Date Member ID       ALANNA DAVIS 1940 [id number]                 Emergency Contacts      (Rel.) Home Phone Work Phone Mobile Phone    Diana Kerr (Power of ) [phone] -- [phone]    Kimmy Kerr (Grandchild) -- -- [phone]            "

## 2017-10-12 NOTE — PROGRESS NOTES
Continued Stay Note  [facility]     Patient Name: Darrian Davis  MRN: [id number]  Today's Date: 10/12/2017    Admit Date: 10/8/2017          Discharge Plan       10/12/17 1004    Case Management/Social Work Plan    Plan TBD    Additional Comments Depending on pt's progress w/ therapy he will need a SNF prior to returning to [facility]. [facility]erral sent to [facility], [facility] cannot accept d/t pt's dementia and past behaviors. [facility] also cannot accept. [facility]CP will f/u w/ [facility] and make additional referrals accordingly.                Discharge Codes     None            Shayna Castellano RN

## 2017-10-12 NOTE — PROGRESS NOTES
Orthopedic Progress Note      Patient: Darrian Davis  YOB: 1940     Date of Admission: 10/8/2017  5:40 AM Medical Record Number: [id number]     Attending Physician: Merry Tucker MD    Status Post:  Right  HIP BIPOLAR CEMENTED Post Operative Day Number: 2    Subjective : No new orthopaedic complaints     Pain Relief: some relief with present medication.     Systemic Complaints: No Complaints  Vitals:    10/11/17 1900 10/11/17 2300 10/12/17 0300 10/12/17 0704   BP: 148/81 120/79 133/82 157/64   BP Location: Right arm Right arm Right arm Right arm   Patient Position: Lying Lying Lying Lying   Pulse: 108 83 75 97   Resp: 16 16 20 20   Temp: 96.6 °F (35.9 °C) 98.1 °F (36.7 °C) 97 °F (36.1 °C) 97 °F (36.1 °C)   TempSrc: Oral Oral Oral Oral   SpO2: 90% 90% 90% 91%   Weight:       Height:           Physical Exam: 77 y.o. male    General Appearance:       Alert, cooperative, in no acute distress                  Extremities:    Dressing Clean, Dry and Intact         Incision healthy without signs or symptoms of infections         No clinical sign of DVT        Able to do good movements of digits    Pulses:   Pulses palpable and equal bilaterally           Diagnostic Tests:       Results from last 7 days  Lab Units 10/12/17  0431 10/11/17  0426 10/10/17  0554   WBC 10*3/mm3 6.65 6.12 7.80   HEMOGLOBIN g/dL 8.4* 8.4* 9.4*   HEMATOCRIT % 28.6* 29.0* 32.8*   PLATELETS 10*3/mm3 157 142 181       Results from last 7 days  Lab Units 10/12/17  0431 10/11/17  0426 10/10/17  0554   SODIUM mmol/L 143 143 146*   POTASSIUM mmol/L 4.1 4.1 4.6   CHLORIDE mmol/L 109* 109* 107   CO2 mmol/L 22.8 22.4 23.6   BUN mg/dL 33* 31* 23   CREATININE mg/dL 1.37* 1.40* 1.26   GLUCOSE mg/dL 147* 127* 125*   CALCIUM mg/dL 9.8 10.0 10.4       Results from last 7 days  Lab Units 10/12/17  0431 10/11/17  0426 10/10/17  0554   INR  1.36* 1.47* 1.33*     No results found for: CRYSTAL]  No results found for: CULTURE]  No results found for:  URICACID]  Xr Hand 3+ View Right    Result Date: 10/8/2017  Narrative: RIGHT HAND RADIOGRAPHS  HISTORY: 77-year-old male with a history of right hand pain status post fall.  FINDINGS: PA and lateral radiographs of the right hand were obtained and demonstrate diffuse osteopenia. There is evidence of plate and screw fixation of the radius with the carpal bones and 3rd metacarpal bone. Alignment appears appropriate. Joint space narrowing of the DIP joint of the 2nd digit and MCP joint of the 1st digit is noted.      Impression: There is no evidence for an acute abnormality of the right hand. Plate and screw fixation of the distal forearm, wrist and 2nd metacarpal is noted.  This report was finalized on 10/8/2017 5:18 PM by Dr. Rhett Hdz MD.      Ct Head Without Contrast    Result Date: 10/9/2017  Narrative: HISTORY: Fell, possible head injury. Confusion. Neck pain.  CT OF THE BRAIN WITHOUT CONTRAST 10/08/2017  TECHNIQUE: Axial images were obtained through the brain without intravenous contrast.  COMPARISON: Previous study dated 08/12/2017 is compared.  FINDINGS:  There is moderate diffuse atrophy. There is evidence of previous left frontal craniotomy. There is decreased attenuation of the periventricular white matter bilaterally consistent with small vessel white matter ischemic disease.  There is no evidence of acute infarction, hemorrhage, midline shift or mass effect.  No skull fractures are seen.      Impression: 1. Evidence of previous left frontal craniotomy, atrophy and small vessel white matter ischemic disease. 2. No acute intracranial process identified.   CT OF THE CERVICAL SPINE WITHOUT CONTRAST 10/08/2017  TECHNIQUE: Axial images were obtained from the skull base to the upper thoracic spine. Sagittal and coronal reconstruction images were reviewed.  There is degenerative change of the C1-C2 articulation.  There is C3-4, C4-5, C5-6, C6-7 and C7-T1 spondylosis.  Bilateral carotid artery calcification.   IMPRESSION: 1. Cervical degenerative disease as described. 2. No cervical spine fractures are seen. No significant subluxation is seen.  Radiation dose reduction techniques were utilized, including automated exposure control and exposure modulation based on body size.  This report was finalized on 10/9/2017 4:04 PM by Dr. Juvencio Stark MD.      Ct Cervical Spine Without Contrast    Result Date: 10/9/2017  Narrative: HISTORY: Fell, possible head injury. Confusion. Neck pain.  CT OF THE BRAIN WITHOUT CONTRAST 10/08/2017  TECHNIQUE: Axial images were obtained through the brain without intravenous contrast.  COMPARISON: Previous study dated 08/12/2017 is compared.  FINDINGS:  There is moderate diffuse atrophy. There is evidence of previous left frontal craniotomy. There is decreased attenuation of the periventricular white matter bilaterally consistent with small vessel white matter ischemic disease.  There is no evidence of acute infarction, hemorrhage, midline shift or mass effect.  No skull fractures are seen.      Impression: 1. Evidence of previous left frontal craniotomy, atrophy and small vessel white matter ischemic disease. 2. No acute intracranial process identified.   CT OF THE CERVICAL SPINE WITHOUT CONTRAST 10/08/2017  TECHNIQUE: Axial images were obtained from the skull base to the upper thoracic spine. Sagittal and coronal reconstruction images were reviewed.  There is degenerative change of the C1-C2 articulation.  There is C3-4, C4-5, C5-6, C6-7 and C7-T1 spondylosis.  Bilateral carotid artery calcification.  IMPRESSION: 1. Cervical degenerative disease as described. 2. No cervical spine fractures are seen. No significant subluxation is seen.  Radiation dose reduction techniques were utilized, including automated exposure control and exposure modulation based on body size.  This report was finalized on 10/9/2017 4:04 PM by Dr. Juvencio Stark MD.      Xr Chest 1 View    Result Date:  10/9/2017  Narrative: HISTORY: Fell, right hip pain.  AP PELVIS  FINDINGS:  2 views of the pelvis show a minimally displaced fracture of the right femoral neck. No other fractures of the pelvis are seen. Proximal left femur appears intact.      Impression: Right femoral neck fracture.  AP CHEST  FINDINGS:  Heart size is mildly enlarged. Lungs are slightly underinflated with some vascular crowding. There is some bilateral vascular congestion. There may be some minimal interstitial edema as the interstitial markings appear somewhat prominent. There also may be a tiny amount of fluid in the minor fissure on the right. There is some calcification of the mitral valve annulus.  IMPRESSION: 1. Mild vascular congestion and suggestive mild interstitial edema somewhat more prominent than on 08/12/2017. This finding is somewhat exaggerated by suboptimal inspiration. 2. Mild cardiomegaly.  This report was finalized on 10/9/2017 4:05 PM by Dr. Juvencio Stark MD.      Xr Pelvis 1 Or 2 View    Result Date: 10/9/2017  Narrative: HISTORY: Fell, right hip pain.  AP PELVIS  FINDINGS:  2 views of the pelvis show a minimally displaced fracture of the right femoral neck. No other fractures of the pelvis are seen. Proximal left femur appears intact.      Impression: Right femoral neck fracture.  AP CHEST  FINDINGS:  Heart size is mildly enlarged. Lungs are slightly underinflated with some vascular crowding. There is some bilateral vascular congestion. There may be some minimal interstitial edema as the interstitial markings appear somewhat prominent. There also may be a tiny amount of fluid in the minor fissure on the right. There is some calcification of the mitral valve annulus.  IMPRESSION: 1. Mild vascular congestion and suggestive mild interstitial edema somewhat more prominent than on 08/12/2017. This finding is somewhat exaggerated by suboptimal inspiration. 2. Mild cardiomegaly.  This report was finalized on 10/9/2017 4:05 PM  by Dr. Juvencio Stark MD.      Xr Hip With Or Without Pelvis 1 View Right    Result Date: 10/10/2017  Narrative: RIGHT HIP WITH PELVIS 2 VIEWS  HISTORY: 77-year-old male postop right partial hip replacement  COMPARISON: 10/08/2017  FINDINGS: 1. Satisfactory appearance following placement of prosthesis, no other change in appearance of bony pelvis. 2. Imaging is limited by osteopenia and considerable overlying bowel content.  This report was finalized on 10/10/2017 6:06 PM by Dr. Horacio Ang MD.          Current Medications:  Scheduled Meds:  aspirin 81 mg Oral Daily   Divalproex Sodium 250 mg Oral TID   enoxaparin 40 mg Subcutaneous Daily   ferrous sulfate 325 mg Oral BID   gabapentin 300 mg Oral TID   metoprolol tartrate 25 mg Oral BID   multivitamin with minerals 1 tablet Oral Daily   traZODone 100 mg Oral Nightly   warfarin 2.5 mg Oral Daily     Continuous Infusions:   PRN Meds:.•  acetaminophen  •  bisacodyl  •  dextrose  •  dextrose  •  docusate sodium  •  glucagon (human recombinant)  •  HYDROcodone-acetaminophen  •  LORazepam  •  Morphine **AND** naloxone  •  nitroglycerin  •  sodium chloride    Assessment: Status post  HIP BIPOLAR CEMENTED    Patient Active Problem List   Diagnosis   • Severe sepsis   • Acute on chronic renal insufficiency   • Hypernatremia   • Dehydration   • Atrial fibrillation   • Nonsustained ventricular tachycardia   • Dementia   • Closed fracture of right hip   • CAD (coronary artery disease)   • Hypertension   • COPD (chronic obstructive pulmonary disease)   • DM (diabetes mellitus), type 2   • Dementia   • Atrial fibrillation   • History of DVT (deep vein thrombosis)   • DNR (do not resuscitate)   • Anticoagulated on Coumadin   • Iron deficiency anemia   • CKD (chronic kidney disease), stage III       PLAN:   Continues current post-op course  Anticoagulation: Lovenox and coumadin started , DC lovenox on discharge  Dressing Change in am  Mobilize with PT as tolerated per  protocol    Weight Bearing: WBAT  Discharge Plan: OK to plan for discharge in  today to SNF  from orthopadic perspective.    Greg Dickerson MD    Date: 10/12/2017    Time: 7:38 AM

## 2017-10-13 NOTE — DISCHARGE INSTRUCTIONS
Discharge and Follow up Instructions:     Total Hip Replacement Discharge Instructions:    I. ACTIVITIES:  1. Exercises:  ? Complete exercise program as taught by the hospital physical therapist 2 times per day  ? Exercise program will be advanced by the physical therapist  ? During the day be up ambulating every 2 hours (while awake) for short distances  ? Complete the ankle pump exercises at least 10 times per hour (while awake)  ? Elevate legs when in bed and for at least 30 minutes during the day.Use cold packs 20-30 minutes approximately 5 times per day. This should be done before and after completing your exercises and at any time you are experiencing pain/ stiffness in your operative extremity.      2. Activities of Daily Living:  ? No tub baths, hot tubs, or swimming pools for 4 weeks  ? May shower and let water run over the incision on post-operative day #5 if no drainage. Do not scrub or rub the incision. Simply let the water run over the incision and pat dry.    II. Restrictions  ? Continue Anterior  hip precautions as taught at the hospital  ? Your surgeon will discuss with you when you will be able to drive again. Usual guidelines are you are to be off pain medications prior to driving.  ? Weight bearing is as tolerated  ? First week stay inside on even terrain. May go up and down stairs one stair at a time utilizing the hand rail once cleared by physical therapy to do so.  ? After one week, you may venture outside (if cleared to do so by physical therapist).    III. Precautions:  ? Everyone that comes near you should wash their hands  ? No elective dental, genital-urinary, or colon procedures or surgical procedures for 12 weeks after surgery unless absolutely necessary.  ?  If dental work or surgical procedure is deemed absolutely necessary, you will need to contact your surgeon as you will need to take antibiotics 1 hour prior to any dental work (including teeth cleanings).  ? Please discuss with your  surgeon prophylactic antibiotics as the length of time this intervention will be necessary for you varies with each patient’s health history and situation.  ? Avoid sick people. If you must be around someone who is ill, they should wear a mask.  ? Avoid visits to the Emergency Room or Urgent Care. If you feel you need to go to the Emergency Room, please notify your Surgeon's office.  ? Stockings are to be worn for one week after surgery and are to be placed on in the morning and removed at night. Observe your skin when stocking is removed for any problems. Monitor the stockings to ensure that any swelling is not causing the stockings to become too tight. In this case, remove stockings immediately.      IV. INCISION CARE:  ? Wash your hands prior to dressing changes  ? Change the dressing as needed to keep incision clean and dry. Utilize dry gauze and paper tape. Avoid touching the side of the gauze that goes against the incision with your hands.  ? No creams or ointments to the incision  ? May remove dressing once the incision is free of drainage  ? Do not touch or pick at the incision  ? Check incision every day and notify surgeon immediately if any of the following signs or symptoms are noted:  o Increase in redness  o Increase in swelling around the incision and of the entire extremity  o Increase in pain  o Drainage oozing from the incision  o Pulling apart of the edges of the incision  o Increase in overall body temperature (greater than 100.5 degrees)  ? Your Staples will be removed between 10-14 days postoperation.  This may be done by either the home health nurse, rehabilitation nurse or during your return visit to Dr. Dickerson's office.  You will then be instructed on how to care for the incision.  (Please call the office if your staples have not been removed within 14 days after surgery).    V. Medications:   1. Anticoagulants: You will be discharged on an anticoagulant. This is a prophylactic medication  that helps prevent blood clots during your post-operative period.  You will be on coumadin home med.   ? While taking the anticoagulant, you should avoid taking any additional aspirin, ibuprofen (Advil or Motrin), Aleve (Naprosyn) or other non-steroidal anti-inflammatory medications.   ? Notify surgeon immediately if any varinder bleeding is noted in the urine, stool, emesis, or from the nose or the incision. Blood in the stool will often appear as black rather than red. Blood in urine may appear as pink. Blood in emesis may appear as brown/black like coffee grounds.  ? You will need to apply pressure for longer periods of time to any cuts or abrasions to stop bleeding  ? Avoid alcohol while taking anticoagulants    2. Stool Softeners: You will be at greater risk of constipation after surgery due to being less mobile and the pain medications.   ? Take stool softeners as instructed by your surgeon while on pain medications. Over the counter Colace 100 mg 1-2 capsules twice daily.   ? If stools become too loose or too frequent, please decreases the dosage or stop the stool softener.  ? If constipation occurs despite use of stool softeners, you are to continue the stool softeners and add a laxative (Milk of Magnesia 1 ounce daily as needed).  ? Dulcolax oral tabs or suppository, or a fleets enema can also be utilized for constipation and can be obtained over the counter.   ? If above interventions are unsuccessful in inducing bowel movements, please contact your surgeon's office / family physician's office.  ? Drink plenty of fluids, and eat fruits and vegetables during your recovery time    3. Pain Medications utilized after surgery are narcotics and the law requires that the following information be given to all patients that are prescribed narcotics:  ? CLASSIFICATION: Pain medications are called Opioids and are narcotics  ? LEGALITIES: It is illegal to share narcotics with others and to drive within 24 hours of taking  narcotics  ? POTENTIAL SIDE EFFECTS: Potential side effects of opioids include: nausea, vomiting, itching, dizziness, drowsiness, dry mouth, constipation, and difficulty urinating.  ? POTENTIAL ADVERSE EFFECTS:   o Opioid tolerance can develop with use of pain medications and this simply means that it requires more and more of the medication to control pain; however, this is seen more in patients that use opioids for longer periods of time.  o Opioid dependence can develop with use of Opioids and this simply means that to stop the medication can cause withdrawal symptoms; however, this is seen with patients that use Opioids for longer periods of time.  o Opioid addiction can develop with use of Opioids and the incidence of this is very unlikely in patients who take the medications as ordered and stop the medications as instructed.  o Opioid overdose can be dangerous, but is unlikely when the medication is taken as ordered and stopped when ordered. It is important not to mix opioids with alcohol or with and type of sedative such as Benadryl as this can lead to over sedation and respiratory difficulty.  ? DOSAGE:   o Pain medications will need to be taken consistently for the first week to decrease pain and promote adequate pain relief and participation in physical therapy.  o After the initial surgical pain begins to resolve, you may begin to decrease the pain medication. By the end of 6 weeks, you should be off of pain medications.  o Refills will not be given by the office during evening hours, on weekends, or after 6 weeks post-op.  o To seek refills on pain medications during the initial 6 week post-operative period, you must call the office 48 hours in advance to request the refill. The office will then notify you when to  the prescription. DO NOT wait until you are out of the medication to request a refill.    V. FOLLOW-UP VISITS:  ? You will need to follow up in the office with your surgeon in  2 weeks  October 30, 2017. Please call this number [phone]  to schedule this appointment.  If you have any concerns or suspected complications prior to your follow up visit, please call your surgeons office. Do not wait until your appointment time if you suspect complications. These will need to be addressed in the office promptly.

## 2017-10-13 NOTE — NURSING NOTE
"Attempted to administer morning meds to pt at approximately 0850. Greeted pt and explained going to give med, pt responded \"tough\" after a couple attempts to explain importance of med. Pt closed eyes and this nurse attempted to perform initial assessment on pt. Gently placed stethescope on chest and pt grabbed this nurse's hand. Asked pt to let go, but would not. Pt let go with assistance from charge nurse to free hand. Pt then closed eyes and began to rest. Pt was left in bed in lowest position with alarm on and call light in place. Will continue to monitor and attempt to give med again.   "

## 2017-10-13 NOTE — PROGRESS NOTES
"     LOS: 5 days   Primary Care Physician: Narciso Ma MD     Subjective   Currently calm.  Daughter at bedside.  Was agitated earlier with physical therapy.    Vital Signs  Body mass index is 26.5 kg/(m^2).  Temp:  [97 °F (36.1 °C)-97.9 °F (36.6 °C)] 97 °F (36.1 °C)  Heart Rate:  [93-98] 98  Resp:  [20-24] 20  BP: (153-164)/(93-95) 164/95      Objective:  General Appearance:  In no acute distress (Elderly and frail).    Vital signs: (most recent): Blood pressure 164/95, pulse 98, temperature 97 °F (36.1 °C), temperature source Oral, resp. rate 20, height 71\" (180.3 cm), weight 190 lb (86.2 kg), SpO2 93 %.    Lungs:  There are decreased breath sounds.  No wheezes, rales or rhonchi.    Heart: Normal rate.  Irregular rhythm.  No murmur.   Abdomen: Abdomen is soft and non-distended.  Bowel sounds are normal.   There is no abdominal tenderness.   There is no splenomegaly. There is no hepatomegaly.   Extremities: There is no dependent edema.    Neurological: Patient is alert.          Results Review:    I reviewed the patient's new clinical results.      Results from last 7 days  Lab Units 10/12/17  0431 10/11/17  0426   WBC 10*3/mm3 6.65 6.12   HEMOGLOBIN g/dL 8.4* 8.4*   PLATELETS 10*3/mm3 157 142       Results from last 7 days  Lab Units 10/12/17  0431 10/11/17  0426   SODIUM mmol/L 143 143   POTASSIUM mmol/L 4.1 4.1   CHLORIDE mmol/L 109* 109*   CO2 mmol/L 22.8 22.4   BUN mg/dL 33* 31*   CREATININE mg/dL 1.37* 1.40*   CALCIUM mg/dL 9.8 10.0   GLUCOSE mg/dL 147* 127*       Results from last 7 days  Lab Units 10/13/17  0426   INR  1.32*     Hemoglobin A1C:  Lab Results   Component Value Date    HGBA1C 5.00 10/09/2017       Glucose Range:  Glucose   Date/Time Value Ref Range Status   10/12/2017 1103 146 (H) 70 - 130 mg/dL Final   10/12/2017 0605 132 (H) 70 - 130 mg/dL Final   10/11/2017 2133 198 (H) 70 - 130 mg/dL Final   10/11/2017 1646 157 (H) 70 - 130 mg/dL Final   10/11/2017 1045 148 (H) 70 - 130 mg/dL Final "   10/11/2017 0559 132 (H) 70 - 130 mg/dL Final       Lab Results   Component Value Date    FPXTSFOO71 495 10/08/2017       Lab Results   Component Value Date    TSH 0.258 (L) 10/08/2017       Assessment & Plan      Medication Review: Yes    Active Hospital Problems (** Indicates Principal Problem)    Diagnosis Date Noted   • **Closed fracture of right hip [S72.001A] 10/08/2017   • CAD (coronary artery disease) [I25.10] 10/08/2017   • Hypertension [I10] 10/08/2017   • COPD (chronic obstructive pulmonary disease) [J44.9] 10/08/2017   • DM (diabetes mellitus), type 2 [E11.9] 10/08/2017   • Dementia [F03.90] 10/08/2017   • Atrial fibrillation [I48.91] 10/08/2017   • History of DVT (deep vein thrombosis) [Z86.718] 10/08/2017   • DNR (do not resuscitate) [Z66] 10/08/2017   • Anticoagulated on Coumadin [Z51.81, Z79.01] 10/08/2017   • Iron deficiency anemia [D50.9] 10/08/2017   • CKD (chronic kidney disease), stage III [N18.3] 10/08/2017      Resolved Hospital Problems    Diagnosis Date Noted Date Resolved   • Hyperkalemia [E87.5] 10/08/2017 10/10/2017       Assessment/Plan  1.  Postop day 3 right femoral neck fracture.  With dementia and intermittent agitation, disposition has been problematic.  Will ask psychiatry to see for any recommendations.  Check bladder scan to make sure he doesn't have urinary retention.  2.  Chronic A. fib.  INR subtherapeutic.  Increase Coumadin to 6 mg today.  Recheck in a.m.  3.  Chronic kidney disease stage III, stable  4.  Diabetes mellitus type 2.  No treatment needed  5.  Sick euthyroid    Merry Tucker MD  10/13/17  4:05 PM

## 2017-10-13 NOTE — PLAN OF CARE
Problem: Patient Care Overview (Adult)  Goal: Plan of Care Review  Outcome: Ongoing (interventions implemented as appropriate)    10/13/17 0239   Coping/Psychosocial Response Interventions   Plan Of Care Reviewed With patient   Patient Care Overview   Progress improving   Outcome Evaluation   Outcome Summary/Follow up Plan minimal pain during shift; pt refused evening meds; sleeping majority of night; voiding per brief- incontienet; attempted education about importance of BP monitoring and medications r/t htn- needs reinforcement; vss; plan for d/c in AM to morning point       Goal: Adult Individualization and Mutuality  Outcome: Ongoing (interventions implemented as appropriate)  Goal: Discharge Needs Assessment  Outcome: Ongoing (interventions implemented as appropriate)    Problem: Fall Risk (Adult)  Goal: Absence of Falls  Outcome: Ongoing (interventions implemented as appropriate)    Problem: Skin Integrity Impairment, Risk/Actual (Adult)  Goal: Skin Integrity/Wound Healing  Outcome: Ongoing (interventions implemented as appropriate)    Problem: Perioperative Period (Adult)  Goal: Signs and Symptoms of Listed Potential Problems Will be Absent or Manageable (Perioperative Period)  Outcome: Ongoing (interventions implemented as appropriate)

## 2017-10-13 NOTE — PLAN OF CARE
Problem: Patient Care Overview (Adult)  Goal: Plan of Care Review  Outcome: Ongoing (interventions implemented as appropriate)    10/13/17 1013   Coping/Psychosocial Response Interventions   Plan Of Care Reviewed With patient   Patient Care Overview   Progress unable to show any progress toward functional goals  (combative , throwing things during session)   Outcome Evaluation   Outcome Summary/Follow up Plan unsafe to attempt mobility, after exer began throwing things out of bed at therapist

## 2017-10-13 NOTE — PROGRESS NOTES
Continued Stay Note  [facility]     Patient Name: Darrian Davis  MRN: [id number]  Today's Date: 10/13/2017    Admit Date: 10/8/2017          Discharge Plan       10/13/17 5354    Case Management/Social Work Plan    Additional Comments Alonso Vu cannot accept at this time. MD to order CMU consult. CCP will f/u Monday 10/16.              Discharge Codes     None            Shayna Catsellano RN

## 2017-10-13 NOTE — DISCHARGE PLACEMENT REQUEST
"Alanna Davis (77 y.o. Male)     Date of Birth Social Security Number Address Home Phone MRN    [id number]  [id number] [place] [address] [phone] [id number]    Gnosticist Marital Status          Sikh Single       Admission Date Admission Type Admitting Provider Attending Provider Department, Room/Bed    10/8/17 Emergency [place], MD Garrett Herman Juliana, MD [address], P780/1    Discharge Date Discharge Disposition Discharge Destination                      Attending Provider: Merry Tucker MD     Allergies:  Bee Venom, Iodine, Latex, Propoxyphene    Isolation:  None   Infection:  None   Code Status:  FULL    Ht:  71\" (180.3 cm)   Wt:  190 lb (86.2 kg)    Admission Cmt:  None   Principal Problem:  Closed fracture of right hip [S72.001A]                 Active Insurance as of 10/8/2017     Primary Coverage     Payor Plan Insurance Group Employer/Plan Group    MEDICARE MEDICARE A & B      Payor Plan Address Payor Plan Phone Number Effective From Effective To    [address] [phone] 10/1/1996     [place] [address]       Subscriber Name Subscriber Birth Date Member ID       ALANNA DAVIS 1940 [id number]                 Emergency Contacts      (Rel.) Home Phone Work Phone Mobile Phone    Diana Kerr (Power of ) [phone] -- [phone]    Kimmy Kerr (Grandchild) -- -- [phone]              "

## 2017-10-13 NOTE — THERAPY TREATMENT NOTE
"Acute Care - Physical Therapy Treatment Note  [facility]     Patient Name: Darrian Davis  : 1940  MRN: [id number]  Today's Date: 10/13/2017  Onset of Illness/Injury or Date of Surgery Date: 10/10/17     Referring Physician: Tuan    Admit Date: 10/8/2017    Visit Dx:    ICD-10-CM ICD-9-CM   1. Closed fracture of right hip, initial encounter S72.001A 820.8   2. Multiple contusions T07.XXXA 924.8   3. Fall, initial encounter W19.XXXA E888.9   4. Difficulty walking R26.2 719.7     Patient Active Problem List   Diagnosis   • Severe sepsis   • Acute on chronic renal insufficiency   • Hypernatremia   • Dehydration   • Atrial fibrillation   • Nonsustained ventricular tachycardia   • Dementia   • Closed fracture of right hip   • CAD (coronary artery disease)   • Hypertension   • COPD (chronic obstructive pulmonary disease)   • DM (diabetes mellitus), type 2   • Dementia   • Atrial fibrillation   • History of DVT (deep vein thrombosis)   • DNR (do not resuscitate)   • Anticoagulated on Coumadin   • Iron deficiency anemia   • CKD (chronic kidney disease), stage III               Adult Rehabilitation Note       10/13/17 0952 10/12/17 1649       Rehab Assessment/Intervention    Discipline physical therapy assistant  -JM physical therapist  -EM     Document Type therapy note (daily note)  - therapy note (daily note)  -EM     Subjective Information complains of;fatigue  - agree to therapy   agreeable at first and then states \"no, no, no\" continuously  -EM     Precautions/Limitations hip precautions- right;fall precautions  - anterior hip precautions- right  -EM     Specific Treatment Considerations Bipolar endo prec  -JM      Recorded by [JM] Nikki Middleton PTA [EM] Ayesha Reid, PT     Pain Assessment    Pain Assessment Unable to assess   confused, combative  - Unable to assess   pt unable to give pain number, seems to have more pain today  -EM     Recorded by [ILDA] Nikki Middleton PTA [EM] " Ayesha Reid, PT     Bed Mobility, Assessment/Treatment    Bed Mob, Supine to Sit, [place] unable to perform;not appropriate to assess  -JM maximum assist (25% patient effort);2 person assist required  -EM     Bed Mob, Sit to Supine, [place]  maximum assist (25% patient effort);2 person assist required  -EM     Bed Mobility, Comment throwing blankets and pillow at therapist  -JM      Recorded by [JM] Nikki Middleton PTA [EM] Ayesha Reid, PT     Transfer Assessment/Treatment    Transfers, Sit-Stand [place] not appropriate to assess;unable to perform  -JM moderate assist (50% patient effort);2 person assist required  -EM     Transfers, Stand-Sit [place]  moderate assist (50% patient effort);2 person assist required  -EM     Transfers, Sit-Stand-Sit, Assist Device  rolling walker  -EM     Transfer, Comment too combative and cursing-unsafe to attempt  -JM more resistive to standing today, cues for hand placement but kept moving hand to lower portion of rwx, pt achieved full standing but sat abruptly on both attempts   -EM     Recorded by [JM] Nikki Middleton PTA [EM] Ayesha Reid, PT     Therapy Exercises    Exercise Protocols total hip  -JM total hip  -EM     Total Hip Exercises right:;10 reps;15 reps;with assist   said NO, but assisted w/exer   -JM right:;10 reps;with assist   increased guarding with ex attempts, unable to do isometrics  -EM     Recorded by [JM] Nikki Middleton PTA [EM] Ayesha Reid, PT     Positioning and Restraints    Pre-Treatment Position in bed  -JM in bed  -EM     Post Treatment Position  bed  -EM     In Bed supine;call light within reach;encouraged to call for assist;exit alarm on;notified nsg  -JM supine;call light within reach;exit alarm on;notified nsg  -EM     Recorded by [JM] Nikki Middleton PTA [EM] Ayesha Reid, PT       User Key  (r) = Recorded By, (t) = Taken By, (c) = Cosigned By    Initials Name Effective Dates    EM  Ayesha Reid, PT 12/01/15 -     JM Nikki Kane, PTA 02/18/16 -                 IP PT Goals       10/11/17 1049          Bed Mobility PT LTG    Bed Mobility PT LTG, Date Established 10/11/17  -EM      Bed Mobility PT LTG, Time to Achieve 3 days  -EM      Bed Mobility PT LTG, Activity Type all bed mobility  -EM      Bed Mobility PT LTG, [place] Level moderate assist (50% patient effort);2 person assist required  -EM      Transfer Training PT LTG    Transfer Training PT LTG, Date Established 10/11/17  -EM      Transfer Training PT LTG, Time to Achieve 3 days  -EM      Transfer Training PT LTG, Activity Type all transfers  -EM      Transfer Training PT LTG, [place] Level moderate assist (50% patient effort)  -EM      Gait Training PT LTG    Gait Training Goal PT LTG, Date Established 10/11/17  -EM      Gait Training Goal PT LTG, Time to Achieve 3 days  -EM      Gait Training Goal PT LTG, [place] Level moderate assist (50% patient effort);2 person assist required  -EM      Gait Training Goal PT LTG, Assist Device walker, rolling  -EM      Gait Training Goal PT LTG, Distance to Achieve 10 feet   -EM        User Key  (r) = Recorded By, (t) = Taken By, (c) = Cosigned By    Initials Name Provider Type    EM Ayesha Reid, PT Physical Therapist          Physical Therapy Education     Title: PT OT SLP Therapies (Active)     Topic: Physical Therapy (Active)     Point: Mobility training (Active)    Learning Progress Summary    Learner Readiness Method Response Comment Documented by Status   Patient Nonacceptance E,D NL pt did assist w/exer, then became combative  10/13/17 1014 Active    Acceptance E NR  EM 10/12/17 1653 Active    Acceptance E NR  EM 10/11/17 1048 Active               Point: Home exercise program (Active)    Learning Progress Summary    Learner Readiness Method Response Comment Documented by Status   Patient Nonacceptance E,D NL pt did assist w/exer, then became combative   10/13/17 1014 Active    Acceptance E NR  EM 10/11/17 1048 Active               Point: Body mechanics (Active)    Learning Progress Summary    Learner Readiness Method Response Comment Documented by Status   Patient Nonacceptance E,D NL pt did assist w/exer, then became combative  10/13/17 1014 Active               Point: Precautions (Active)    Learning Progress Summary    Learner Readiness Method Response Comment Documented by Status   Patient Nonacceptance E,D NL pt did assist w/exer, then became combative  10/13/17 1014 Active                      User Key     Initials Effective Dates Name Provider Type Discipline     12/01/15 -  Ayesha Reid, PT Physical Therapist PT     02/18/16 -  Nikki Middleton, PTA Physical Therapy Assistant PT                    PT Recommendation and Plan  Anticipated Discharge Disposition: skilled nursing facility  Planned Therapy Interventions: bed mobility training, gait training, home exercise program, transfer training  PT Frequency: daily  Plan of Care Review  Plan Of Care Reviewed With: patient  Progress: unable to show any progress toward functional goals (combative , throwing things during session)  Outcome Summary/Follow up Plan: unsafe to attempt mobility, after exer began throwing things out of bed at therapist          Outcome Measures       10/13/17 1000 10/12/17 1600 10/11/17 1000    How much help from another person do you currently need...    Turning from your back to your side while in flat bed without using bedrails? 2  - 2  -EM 2  -EM    Moving from lying on back to sitting on the side of a flat bed without bedrails? 1  - 2  -EM 2  -EM    Moving to and from a bed to a chair (including a wheelchair)? 1  - 2  -EM 2  -EM    Standing up from a chair using your arms (e.g., wheelchair, bedside chair)? 1  - 2  -EM 2  -EM    Climbing 3-5 steps with a railing? 1  - 1  -EM 1  -EM    To walk in hospital room? 1  - 1  -EM 1  -EM    AM-PAC 6 Clicks Score 7   -JM 10  -EM 10  -EM    Functional Assessment    Outcome Measure Options   AM-PAC 6 Clicks Basic Mobility (PT)  -EM      User Key  (r) = Recorded By, (t) = Taken By, (c) = Cosigned By    Initials Name Provider Type    EM Ayesha Reid, PT Physical Therapist    ILDA Middleton PTA Physical Therapy Assistant           Time Calculation:         PT Charges       10/13/17 [facility]          Time Calculation    Start Time 0940  -      Stop Time 1000  -      Time Calculation (min) 20 min  -      PT Received On 10/13/17  -      PT - Next Appointment 10/14/17  -        User Key  (r) = Recorded By, (t) = Taken By, (c) = Cosigned By    Initials Name Provider Type    ILDA Middleton PTA Physical Therapy Assistant          Therapy Charges for Today     Code Description Service Date Service Provider Modifiers Qty    [id number] HC PT THER PROC EA 15 MIN 10/13/2017 Nikki Middleton PTA GP 1          PT G-Codes  Outcome Measure Options: AM-PAC 6 Clicks Basic Mobility (PT)    Nikki Middleton PTA  10/13/2017

## 2017-10-13 NOTE — NURSING NOTE
"Pt has pulled brief off and urinated on bed. This nurse and CNA attempted to change sheets on bed for pt after explaining procedure. Pt agreed to have sheets changed , but then quickly got agitated with staff and started calling staff \"dirty bastards\" and grabbing arms of staff. This nurse was able to help free CNA's hand from pt. Pt is now lying in bed with arms crossed and eyes closed. Pt has removed dressing partly from left hip. This nurse unable to place new dressing and bladder scan pt as ordered. Bed is in lowest position with alarm and call light in place.   "

## 2017-10-13 NOTE — NURSING NOTE
Spoke with Dr. Tucker r/t pt agitation. Will order Zyprexa for pt. Also spoke to Sk[facility] with [facility]. Consult is on the board she states.

## 2017-10-13 NOTE — PLAN OF CARE
Problem: Patient Care Overview (Adult)  Goal: Plan of Care Review  Outcome: Ongoing (interventions implemented as appropriate)    10/12/17 2015   Coping/Psychosocial Response Interventions   Plan Of Care Reviewed With patient   Patient Care Overview   Progress improving   Outcome Evaluation   Outcome Summary/Follow up Plan VSS. Pain controlled with PO pain med. Able to give morning meds with applesauce with no difficulty. Evening meds were refused and spit out. Pt calm once attempt to give med was complete. No PRN ativan needed this shift. Voiding per brief. No evidence of skin breakdown noted. Possible d/c tomorrow. SNF vs [facility]. Attempted to discuss importance of BP monitoring and med r/t HTN. Pt needs reinforcement. No evidence of learning.          Problem: Fall Risk (Adult)  Goal: Absence of Falls  Outcome: Ongoing (interventions implemented as appropriate)    Problem: Skin Integrity Impairment, Risk/Actual (Adult)  Goal: Skin Integrity/Wound Healing  Outcome: Ongoing (interventions implemented as appropriate)    Problem: Perioperative Period (Adult)  Goal: Signs and Symptoms of Listed Potential Problems Will be Absent or Manageable (Perioperative Period)  Outcome: Ongoing (interventions implemented as appropriate)

## 2017-10-13 NOTE — PROGRESS NOTES
Orthopedic Progress Note      Patient: Darrain Davis  YOB: 1940     Date of Admission: 10/8/2017  5:40 AM Medical Record Number: [id number]     Attending Physician: Merry Tucker MD    Status Post:  Right  HIP BIPOLAR CEMENTED Post Operative Day Number: 3    Subjective : No new orthopaedic complaints     Pain Relief: some relief with present medication.     Systemic Complaints: No Complaints  Vitals:    10/12/17 1525 10/12/17 1900 10/12/17 2300 10/13/17 0300   BP: 137/90 153/93 164/95    BP Location: Right arm Right arm Right arm    Patient Position: Lying Lying Lying    Pulse: 91 94 93    Resp: 20 24 24    Temp: 98 °F (36.7 °C) 97.9 °F (36.6 °C) 97.2 °F (36.2 °C) Comment: Pt. refused  to take his vital signs.   TempSrc: Oral Oral Oral    SpO2: 93% 95% 94%    Weight:       Height:           Physical Exam: 77 y.o. male    General Appearance:       Alert, cooperative, in no acute distress                  Extremities:    Dressing Clean, Dry and Intact        Incision healthy without signs or symptoms of infections         No clinical sign of DVT        Able to do good movements of digits    Pulses:   Pulses palpable and equal bilaterally           Diagnostic Tests:       Results from last 7 days  Lab Units 10/12/17  0431 10/11/17  0426 10/10/17  0554   WBC 10*3/mm3 6.65 6.12 7.80   HEMOGLOBIN g/dL 8.4* 8.4* 9.4*   HEMATOCRIT % 28.6* 29.0* 32.8*   PLATELETS 10*3/mm3 157 142 181       Results from last 7 days  Lab Units 10/12/17  0431 10/11/17  0426 10/10/17  0554   SODIUM mmol/L 143 143 146*   POTASSIUM mmol/L 4.1 4.1 4.6   CHLORIDE mmol/L 109* 109* 107   CO2 mmol/L 22.8 22.4 23.6   BUN mg/dL 33* 31* 23   CREATININE mg/dL 1.37* 1.40* 1.26   GLUCOSE mg/dL 147* 127* 125*   CALCIUM mg/dL 9.8 10.0 10.4       Results from last 7 days  Lab Units 10/13/17  0426 10/12/17  0431 10/11/17  0426   INR  1.32* 1.36* 1.47*     No results found for: CRYSTAL]  No results found for: CULTURE]  No results found for:  URICACID]  Xr Hand 3+ View Right    Result Date: 10/8/2017  Narrative: RIGHT HAND RADIOGRAPHS  HISTORY: 77-year-old male with a history of right hand pain status post fall.  FINDINGS: PA and lateral radiographs of the right hand were obtained and demonstrate diffuse osteopenia. There is evidence of plate and screw fixation of the radius with the carpal bones and 3rd metacarpal bone. Alignment appears appropriate. Joint space narrowing of the DIP joint of the 2nd digit and MCP joint of the 1st digit is noted.      Impression: There is no evidence for an acute abnormality of the right hand. Plate and screw fixation of the distal forearm, wrist and 2nd metacarpal is noted.  This report was finalized on 10/8/2017 5:18 PM by Dr. Rhett Hdz MD.      Ct Head Without Contrast    Result Date: 10/9/2017  Narrative: HISTORY: Fell, possible head injury. Confusion. Neck pain.  CT OF THE BRAIN WITHOUT CONTRAST 10/08/2017  TECHNIQUE: Axial images were obtained through the brain without intravenous contrast.  COMPARISON: Previous study dated 08/12/2017 is compared.  FINDINGS:  There is moderate diffuse atrophy. There is evidence of previous left frontal craniotomy. There is decreased attenuation of the periventricular white matter bilaterally consistent with small vessel white matter ischemic disease.  There is no evidence of acute infarction, hemorrhage, midline shift or mass effect.  No skull fractures are seen.      Impression: 1. Evidence of previous left frontal craniotomy, atrophy and small vessel white matter ischemic disease. 2. No acute intracranial process identified.   CT OF THE CERVICAL SPINE WITHOUT CONTRAST 10/08/2017  TECHNIQUE: Axial images were obtained from the skull base to the upper thoracic spine. Sagittal and coronal reconstruction images were reviewed.  There is degenerative change of the C1-C2 articulation.  There is C3-4, C4-5, C5-6, C6-7 and C7-T1 spondylosis.  Bilateral carotid artery calcification.   IMPRESSION: 1. Cervical degenerative disease as described. 2. No cervical spine fractures are seen. No significant subluxation is seen.  Radiation dose reduction techniques were utilized, including automated exposure control and exposure modulation based on body size.  This report was finalized on 10/9/2017 4:04 PM by Dr. Juvencio Stark MD.      Ct Cervical Spine Without Contrast    Result Date: 10/9/2017  Narrative: HISTORY: Fell, possible head injury. Confusion. Neck pain.  CT OF THE BRAIN WITHOUT CONTRAST 10/08/2017  TECHNIQUE: Axial images were obtained through the brain without intravenous contrast.  COMPARISON: Previous study dated 08/12/2017 is compared.  FINDINGS:  There is moderate diffuse atrophy. There is evidence of previous left frontal craniotomy. There is decreased attenuation of the periventricular white matter bilaterally consistent with small vessel white matter ischemic disease.  There is no evidence of acute infarction, hemorrhage, midline shift or mass effect.  No skull fractures are seen.      Impression: 1. Evidence of previous left frontal craniotomy, atrophy and small vessel white matter ischemic disease. 2. No acute intracranial process identified.   CT OF THE CERVICAL SPINE WITHOUT CONTRAST 10/08/2017  TECHNIQUE: Axial images were obtained from the skull base to the upper thoracic spine. Sagittal and coronal reconstruction images were reviewed.  There is degenerative change of the C1-C2 articulation.  There is C3-4, C4-5, C5-6, C6-7 and C7-T1 spondylosis.  Bilateral carotid artery calcification.  IMPRESSION: 1. Cervical degenerative disease as described. 2. No cervical spine fractures are seen. No significant subluxation is seen.  Radiation dose reduction techniques were utilized, including automated exposure control and exposure modulation based on body size.  This report was finalized on 10/9/2017 4:04 PM by Dr. Juvencio Stark MD.      Xr Chest 1 View    Result Date:  10/9/2017  Narrative: HISTORY: Fell, right hip pain.  AP PELVIS  FINDINGS:  2 views of the pelvis show a minimally displaced fracture of the right femoral neck. No other fractures of the pelvis are seen. Proximal left femur appears intact.      Impression: Right femoral neck fracture.  AP CHEST  FINDINGS:  Heart size is mildly enlarged. Lungs are slightly underinflated with some vascular crowding. There is some bilateral vascular congestion. There may be some minimal interstitial edema as the interstitial markings appear somewhat prominent. There also may be a tiny amount of fluid in the minor fissure on the right. There is some calcification of the mitral valve annulus.  IMPRESSION: 1. Mild vascular congestion and suggestive mild interstitial edema somewhat more prominent than on 08/12/2017. This finding is somewhat exaggerated by suboptimal inspiration. 2. Mild cardiomegaly.  This report was finalized on 10/9/2017 4:05 PM by Dr. Juvencio Stark MD.      Xr Pelvis 1 Or 2 View    Result Date: 10/9/2017  Narrative: HISTORY: Fell, right hip pain.  AP PELVIS  FINDINGS:  2 views of the pelvis show a minimally displaced fracture of the right femoral neck. No other fractures of the pelvis are seen. Proximal left femur appears intact.      Impression: Right femoral neck fracture.  AP CHEST  FINDINGS:  Heart size is mildly enlarged. Lungs are slightly underinflated with some vascular crowding. There is some bilateral vascular congestion. There may be some minimal interstitial edema as the interstitial markings appear somewhat prominent. There also may be a tiny amount of fluid in the minor fissure on the right. There is some calcification of the mitral valve annulus.  IMPRESSION: 1. Mild vascular congestion and suggestive mild interstitial edema somewhat more prominent than on 08/12/2017. This finding is somewhat exaggerated by suboptimal inspiration. 2. Mild cardiomegaly.  This report was finalized on 10/9/2017 4:05 PM  by Dr. Juvencio Stark MD.      Xr Hip With Or Without Pelvis 1 View Right    Result Date: 10/10/2017  Narrative: RIGHT HIP WITH PELVIS 2 VIEWS  HISTORY: 77-year-old male postop right partial hip replacement  COMPARISON: 10/08/2017  FINDINGS: 1. Satisfactory appearance following placement of prosthesis, no other change in appearance of bony pelvis. 2. Imaging is limited by osteopenia and considerable overlying bowel content.  This report was finalized on 10/10/2017 6:06 PM by Dr. Horacio Ang MD.          Current Medications:  Scheduled Meds:  aspirin 81 mg Oral Daily   Divalproex Sodium 250 mg Oral TID   enoxaparin 40 mg Subcutaneous Daily   ferrous sulfate 325 mg Oral BID   gabapentin 300 mg Oral TID   metoprolol tartrate 25 mg Oral BID   multivitamin with minerals 1 tablet Oral Daily   traZODone 100 mg Oral Nightly   warfarin 4 mg Oral Daily     Continuous Infusions:   PRN Meds:.•  acetaminophen  •  bisacodyl  •  dextrose  •  dextrose  •  docusate sodium  •  glucagon (human recombinant)  •  HYDROcodone-acetaminophen  •  LORazepam  •  Morphine **AND** naloxone  •  nitroglycerin  •  sodium chloride    Assessment: Status post  HIP BIPOLAR CEMENTED    Patient Active Problem List   Diagnosis   • Severe sepsis   • Acute on chronic renal insufficiency   • Hypernatremia   • Dehydration   • Atrial fibrillation   • Nonsustained ventricular tachycardia   • Dementia   • Closed fracture of right hip   • CAD (coronary artery disease)   • Hypertension   • COPD (chronic obstructive pulmonary disease)   • DM (diabetes mellitus), type 2   • Dementia   • Atrial fibrillation   • History of DVT (deep vein thrombosis)   • DNR (do not resuscitate)   • Anticoagulated on Coumadin   • Iron deficiency anemia   • CKD (chronic kidney disease), stage III       PLAN:   Continues current post-op course  Anticoagulation: Lovenox and coumadin started , INR 1.32 today,  DC lovenox tomorrow.  Dressing Change in am  Mobilize with PT as tolerated  per protocol    Weight Bearing: WBAT  Discharge Plan: OK to plan for discharge  from orthopadic perspective.    Greg Dickerson MD    Date: 10/13/2017    Time: 7:08 AM

## 2017-10-14 NOTE — PLAN OF CARE
Problem: Patient Care Overview (Adult)  Goal: Plan of Care Review  Outcome: Ongoing (interventions implemented as appropriate)    10/14/17 0433 10/14/17 1509   Coping/Psychosocial Response Interventions   Plan Of Care Reviewed With patient --    Patient Care Overview   Progress --  progress toward functional goals is gradual   Outcome Evaluation   Outcome Summary/Follow up Plan --  Pt pod 4 right bipolar hip. Pt disoriented but cooperative with staff on this shift. BP elevated this morning, but after AM meds administered BP WNL. Will continue to monitor BP. Awaiting placement to rehab for d/c.       Goal: Adult Individualization and Mutuality  Outcome: Ongoing (interventions implemented as appropriate)  Goal: Discharge Needs Assessment  Outcome: Ongoing (interventions implemented as appropriate)    Problem: Fractured Hip (Adult)  Goal: Signs and Symptoms of Listed Potential Problems Will be Absent or Manageable (Fractured Hip)  Outcome: Ongoing (interventions implemented as appropriate)    Problem: Fall Risk (Adult)  Goal: Absence of Falls  Outcome: Ongoing (interventions implemented as appropriate)    Problem: Skin Integrity Impairment, Risk/Actual (Adult)  Goal: Skin Integrity/Wound Healing  Outcome: Ongoing (interventions implemented as appropriate)    Problem: Perioperative Period (Adult)  Goal: Signs and Symptoms of Listed Potential Problems Will be Absent or Manageable (Perioperative Period)  Outcome: Ongoing (interventions implemented as appropriate)

## 2017-10-14 NOTE — PROGRESS NOTES
"     LOS: 6 days   Primary Care Physician: Narciso Ma MD     Subjective   Was sleeping but opened his eyes when I called his name.  Answered yes to my questions which he's done most days.    Vital Signs  Body mass index is 26.5 kg/(m^2).  Temp:  [97.1 °F (36.2 °C)-97.5 °F (36.4 °C)] 97.5 °F (36.4 °C)  Heart Rate:  [] 67  Resp:  [18-20] 18  BP: (135-209)/() 135/80      Objective:  General Appearance:  In no acute distress (Looks older than age).    Vital signs: (most recent): Blood pressure 135/80, pulse 67, temperature 97.5 °F (36.4 °C), temperature source Oral, resp. rate 18, height 71\" (180.3 cm), weight 190 lb (86.2 kg), SpO2 99 %.    Lungs:  There are decreased breath sounds.  No wheezes, rales or rhonchi.    Heart: Normal rate.  Irregular rhythm.  No murmur.   Abdomen: Abdomen is soft and non-distended.  Bowel sounds are normal.   There is no abdominal tenderness.   There is no splenomegaly. There is no hepatomegaly.   Extremities: There is dependent edema.  (Trace if any edema)  Neurological: Patient is alert.          Results Review:    I reviewed the patient's new clinical results.      Results from last 7 days  Lab Units 10/12/17  0431 10/11/17  0426   WBC 10*3/mm3 6.65 6.12   HEMOGLOBIN g/dL 8.4* 8.4*   PLATELETS 10*3/mm3 157 142       Results from last 7 days  Lab Units 10/12/17  0431 10/11/17  0426   SODIUM mmol/L 143 143   POTASSIUM mmol/L 4.1 4.1   CHLORIDE mmol/L 109* 109*   CO2 mmol/L 22.8 22.4   BUN mg/dL 33* 31*   CREATININE mg/dL 1.37* 1.40*   CALCIUM mg/dL 9.8 10.0   GLUCOSE mg/dL 147* 127*       Results from last 7 days  Lab Units 10/14/17  0812   INR  1.28*     Hemoglobin A1C:  Lab Results   Component Value Date    HGBA1C 5.00 10/09/2017       Glucose Range:  Glucose   Date/Time Value Ref Range Status   10/12/2017 1103 146 (H) 70 - 130 mg/dL Final   10/12/2017 0605 132 (H) 70 - 130 mg/dL Final   10/11/2017 2133 198 (H) 70 - 130 mg/dL Final   10/11/2017 1646 157 (H) 70 - 130 " mg/dL Final       Lab Results   Component Value Date    SCMAKVEX92 495 10/08/2017       Lab Results   Component Value Date    TSH 0.258 (L) 10/08/2017       Assessment & Plan      Medication Review: Yes    Active Hospital Problems (** Indicates Principal Problem)    Diagnosis Date Noted   • **Closed fracture of right hip [S72.001A] 10/08/2017   • CAD (coronary artery disease) [I25.10] 10/08/2017   • Hypertension [I10] 10/08/2017   • COPD (chronic obstructive pulmonary disease) [J44.9] 10/08/2017   • DM (diabetes mellitus), type 2 [E11.9] 10/08/2017   • Dementia [F03.90] 10/08/2017   • Atrial fibrillation [I48.91] 10/08/2017   • History of DVT (deep vein thrombosis) [Z86.718] 10/08/2017   • DNR (do not resuscitate) [Z66] 10/08/2017   • Anticoagulated on Coumadin [Z51.81, Z79.01] 10/08/2017   • Iron deficiency anemia [D50.9] 10/08/2017   • CKD (chronic kidney disease), stage III [N18.3] 10/08/2017      Resolved Hospital Problems    Diagnosis Date Noted Date Resolved   • Hyperkalemia [E87.5] 10/08/2017 10/10/2017       Assessment/Plan  1.  Postop day 4 right femoral neck fracture.  Disposition is problematic.  No accepting facility has been found.  He is calm this morning.  Await evaluation and recommendations by psychiatry.  Bladder scans ordered to rule out urinary retention though I don't see any measurements in the notes.  2.  Chronic A. fib.  INR still low.  Coumadin increased to 7.5 mg.  Start Lovenox 30 mg subcutaneous daily.  Recheck INR in a.m.  3.  Chronic kidney disease stage III.  Has been stable.  4.  Diabetes mellitus type 2, no medications needed.  No Accu-Cheks required.  5.  Sick euthyroid with normal free T4    Merry Tucker MD  10/14/17  12:09 PM

## 2017-10-14 NOTE — PLAN OF CARE
Problem: Patient Care Overview (Adult)  Goal: Plan of Care Review  Outcome: Ongoing (interventions implemented as appropriate)    10/13/17 2053   Coping/Psychosocial Response Interventions   Plan Of Care Reviewed With patient   Patient Care Overview   Progress no change   Outcome Evaluation   Outcome Summary/Follow up Plan VSS. Pt non cooperative this shift. Agitated and combative at times. PRN Ativan and Zyprexa administered per order. Bandage reapplied after pt removed. Attempted to explain imprtance of BP monitoring and med administration r/t HTN. Pt unable to learn r/t dementia. Dr. Tucker in to see pt orders for psych consult noted. Dtr updated of all new orders.         Problem: Fall Risk (Adult)  Goal: Absence of Falls  Outcome: Ongoing (interventions implemented as appropriate)    Problem: Skin Integrity Impairment, Risk/Actual (Adult)  Goal: Skin Integrity/Wound Healing  Outcome: Ongoing (interventions implemented as appropriate)    Problem: Perioperative Period (Adult)  Goal: Signs and Symptoms of Listed Potential Problems Will be Absent or Manageable (Perioperative Period)  Outcome: Ongoing (interventions implemented as appropriate)

## 2017-10-14 NOTE — CONSULTS
"IDENTIFYING INFORMATION: The patient is a 77-year-old white male admitted to the [facility] to our unit after he had sustained a femoral neck fracture in the local nursing facility.    CHIEF COMPLAINT:  None given    INFORMANT:  Chart patient provides little useful information    RELIABILITY:  Fair    HISTORY OF PRESENT ILLNESS: The patient is a 77-year-old white male admitted on 10/8/2017 after he had sustained a femoral neck fracture in a local nursing facility.  The patient suffers from advanced Alzheimer's disease and was seen by the [facility] in September of this year.  At that time he was transferred to the [facility] for continued treatment.  His current psychotropic medications include Depakote Neurontin does real and when necessary Zyprexa.  The patient is seen related to some behavioral issues.  He has been refusing meds and has been grabbing staff and has been resistant to care.  The patient is generally pleasant during interview today, I still useful information and exhibits signs of advanced dementia.    PAST PSYCHIATRIC HISTORY: As above    PAST MEDICAL HISTORY:  The patient has chronic atrial fibrillation, iron deficiency anemia, hypertension and is noted previously has suffered a recent hip fracture    MEDICATIONS: Aspirin, Depakote, Lovenox, ferrous sulfate, Neurontin, Lopressor, multivitamin, trazodone, Coumadin, Norco, Zyprexa    ALLERGIES:  The venom, iodine, latex, propoxyphene    FAMILY HISTORY:  Noncontributory    SOCIAL HISTORY: The patient is a resident of a local nursing facility and he has a distant history of alcohol dependence.    MENTAL STATUS EXAM: The patient is is disheveled elderly male who is a bed dressed only in a brief.  He is awake and alert but oriented to person only.  He can identify the state as \"[place]\" but also identifies the year as \"[place]\".  He has significant word finding  issues and is unable to persuade in testing of memory or judgment and insight.  He does " "not appear to be responding to any internal stimuli.    ASSETS/LIABILITIES: To be assessed    DIAGNOSTIC IMPRESSION: Primary dementia Alzheimer's type with behavioral disturbance, multiple medical problems as described previously    PLAN:  I will go ahead and increase the patient's when necessary Zyprexa dose to 10 mg to be given every 8 hours as needed for agitation.  Should the patient's problematic behaviors persist, and increased in his Depakote dose or initiation of scheduled Zyprexa could be considered.  It is interesting to note that the patient is not on any cholinesterase inhibiting medication, but at this point it would seem \"late in the game\" to initiate such medication given the patient advanced degree of dementia.  It also seems worth pointing out given the patient's degree of dementia previous health issues and disposition complications, that consideration may need to be given to a palliative consult.    Thank you very much for the opportunity to see this patient.    "

## 2017-10-14 NOTE — NURSING NOTE
Pt more calm at this time. Was able to give pt depakote  Sprinkles in applesauce and administer Lovenox injection with assistance of dtr at bedside. Dtr is taking pt dentures home with her.

## 2017-10-14 NOTE — PLAN OF CARE
Problem: Patient Care Overview (Adult)  Goal: Plan of Care Review  Outcome: Ongoing (interventions implemented as appropriate)    10/14/17 0433   Coping/Psychosocial Response Interventions   Plan Of Care Reviewed With patient   Patient Care Overview   Progress improving   Outcome Evaluation   Outcome Summary/Follow up Plan Pt has been very confused and anxious through the night. Rec PRN order for Zyprexa IM, gave 1 dose. Voiding per brief. Rec order for IV BP medicine due to refusal of oral route. Due to confusion, unable to educate further on HTN management.        Goal: Adult Individualization and Mutuality  Outcome: Ongoing (interventions implemented as appropriate)  Goal: Discharge Needs Assessment  Outcome: Ongoing (interventions implemented as appropriate)    Problem: Fractured Hip (Adult)  Goal: Signs and Symptoms of Listed Potential Problems Will be Absent or Manageable (Fractured Hip)  Outcome: Ongoing (interventions implemented as appropriate)    10/14/17 0433   Fractured Hip   Problems Assessed (Fractured Hip) all   Problems Present (Fractured Hip) functional deficit/self-care deficit;skin breakdown         Problem: Fall Risk (Adult)  Goal: Absence of Falls  Outcome: Ongoing (interventions implemented as appropriate)    10/14/17 0433   Fall Risk (Adult)   Absence of Falls achieves outcome         Problem: Skin Integrity Impairment, Risk/Actual (Adult)  Goal: Skin Integrity/Wound Healing  Outcome: Ongoing (interventions implemented as appropriate)    10/14/17 0433   Skin Integrity Impairment, Risk/Actual (Adult)   Skin Integrity/Wound Healing achieves outcome         Problem: Perioperative Period (Adult)  Goal: Signs and Symptoms of Listed Potential Problems Will be Absent or Manageable (Perioperative Period)  Outcome: Ongoing (interventions implemented as appropriate)    10/14/17 0433   Perioperative Period   Problems Assessed (Perioperative Period) all   Problems Present (Perioperative Period) none

## 2017-10-15 NOTE — PLAN OF CARE
Problem: Patient Care Overview (Adult)  Goal: Plan of Care Review  Outcome: Ongoing (interventions implemented as appropriate)    10/15/17 0332   Coping/Psychosocial Response Interventions   Plan Of Care Reviewed With patient   Patient Care Overview   Progress improving   Outcome Evaluation   Outcome Summary/Follow up Plan patient still remains confuse, but no s/s of agitation noted this shift. remains pleasantly confused. . Blood pressure under control . dressing dry and intact. patient remains incontinent of urine. Unable to educate patient on monitoring blood pressure at home r/t confusion. waiting for rehab placement for d/c.       Goal: Adult Individualization and Mutuality  Outcome: Ongoing (interventions implemented as appropriate)  Goal: Discharge Needs Assessment  Outcome: Ongoing (interventions implemented as appropriate)    Problem: Fall Risk (Adult)  Goal: Absence of Falls  Outcome: Ongoing (interventions implemented as appropriate)    Problem: Skin Integrity Impairment, Risk/Actual (Adult)  Goal: Skin Integrity/Wound Healing  Outcome: Ongoing (interventions implemented as appropriate)    Problem: Perioperative Period (Adult)  Goal: Signs and Symptoms of Listed Potential Problems Will be Absent or Manageable (Perioperative Period)  Outcome: Outcome(s) achieved Date Met:  10/15/17

## 2017-10-15 NOTE — PROGRESS NOTES
[facility]     LOS: 7 days     Subjective:  No complaints    Objective:    Vital Signs:  Temp:  [96.6 °F (35.9 °C)-97.8 °F (36.6 °C)] 97.2 °F (36.2 °C)  Heart Rate:  [] 92  Resp:  [18-20] 18  BP: (133-147)/(78-93) 147/93    General Appearance: no acute distress, appears comfortable  Heart: regular rate and rhythm  Lungs: clear to auscultation bilaterally, respirations unlabored, good air entry  Abdomen: soft, non-tender, no guarding, no rebound, non-distended  Extremities: no edema, no cyanosis  Neurology: speech normal, CN grossly normal, oriented x1  Psychiatric: normal mood and affect    Results Review:    Glucose   Date Value Ref Range Status   10/15/2017 100 (H) 65 - 99 mg/dL Final       Results from last 7 days  Lab Units 10/15/17  0613   WBC 10*3/mm3 5.09   HEMOGLOBIN g/dL 8.7*   HEMATOCRIT % 29.9*   PLATELETS 10*3/mm3 165       Results from last 7 days  Lab Units 10/15/17  0613   SODIUM mmol/L 143   POTASSIUM mmol/L 3.9   CHLORIDE mmol/L 110*   CO2 mmol/L 21.1*   BUN mg/dL 23   CREATININE mg/dL 1.05   CALCIUM mg/dL 9.6   GLUCOSE mg/dL 100*       Results from last 7 days  Lab Units 10/15/17  0613   INR  1.30*                                       I have reviewed daily medications and changes in CPOE    Scheduled meds    aspirin 81 mg Oral Daily   Divalproex Sodium 250 mg Oral TID   enoxaparin 30 mg Subcutaneous Q24H   ferrous sulfate 325 mg Oral BID   gabapentin 300 mg Oral TID   metoprolol tartrate 25 mg Oral BID   multivitamin with minerals 1 tablet Oral Daily   traZODone 100 mg Oral Nightly   warfarin 7.5 mg Oral Daily          PRN meds  •  acetaminophen  •  bisacodyl  •  dextrose  •  dextrose  •  docusate sodium  •  glucagon (human recombinant)  •  hydrALAZINE  •  HYDROcodone-acetaminophen  •  LORazepam  •  Morphine **AND** naloxone  •  nitroglycerin  •  OLANZapine  •  sodium chloride      Principal Problem:    Closed fracture of right hip  Active Problems:    CAD  (coronary artery disease)    Hypertension    COPD (chronic obstructive pulmonary disease)    DM (diabetes mellitus), type 2    Dementia    Atrial fibrillation    History of DVT (deep vein thrombosis)    DNR (do not resuscitate)    Anticoagulated on Coumadin    Iron deficiency anemia    CKD (chronic kidney disease), stage III        Assessment/Plan:    1. Right femoral neck fracture 10/10 .  Disposition is problematic.  No accepting facility has been found.  He is calm this morning.  Bladder scan is elevated at about 300.    2.  Chronic A. fib.  INR still low.  Coumadin increased to 7.5 mg.  Start Lovenox 30 mg subcutaneous daily.  Recheck INR in a.m.    3.  Chronic kidney disease stage III.  Has been stable.    4.  Diabetes mellitus type 2, no medications needed.  No Accu-Cheks required.    5.  Sick euthyroid with normal free T4    6. Dementia - depokote and zyprexa    Fadi Bansal MD  10/15/17  4:43 PM

## 2017-10-16 NOTE — DISCHARGE PLACEMENT REQUEST
"Alanna Davis (77 y.o. Male)     Date of Birth Social Security Number Address Home Phone MRN    1940  [id number] [phone] [id number]    Protestant Marital Status          Denominational Single       Admission Date Admission Type Admitting Provider Attending Provider Department, Room/Bed    10/8/17 [facility], MD Rolf Herman Stephen A, MD [address], P879/1    Discharge Date Discharge Disposition Discharge Destination                      Attending Provider: Fadi Bansal MD     Allergies:  Bee Venom, Iodine, Latex, Propoxyphene    Isolation:  None   Infection:  None   Code Status:  FULL    Ht:  71\" (180.3 cm)   Wt:  190 lb (86.2 kg)    Admission Cmt:  None   Principal Problem:  Closed fracture of right hip [S72.001A]                 Active Insurance as of 10/8/2017     Primary Coverage     Payor Plan Insurance Group Employer/Plan Group    MEDICARE MEDICARE A & B      Payor Plan Address Payor Plan Phone Number Effective From Effective To    [address] [phone] 10/1/1996     [place] [address]       Subscriber Name Subscriber Birth Date Member ID       ALANNA DAVIS 1940 [id number]                 Emergency Contacts      (Rel.) Home Phone Work Phone Mobile Phone    Diana Kerr (Power of ) [phone] -- [phone]    Kimmy Kerr (Grandchild) -- -- [phone]            "

## 2017-10-16 NOTE — PROGRESS NOTES
Continued Stay Note  [facility]     Patient Name: Darrian Davis  MRN: [id number]  Today's Date: 10/16/2017    Admit Date: 10/8/2017          Discharge Plan       10/16/17 [facility]    Case Management/Social Work Plan    Plan Alonso Vu     Additional Comments Alonso Vu/Tiffanie to reevaluate for [facility].               Discharge Codes     None            Shayna Castellano RN

## 2017-10-16 NOTE — THERAPY TREATMENT NOTE
Acute Care - Physical Therapy Treatment Note  [facility]     Patient Name: Darrian Davis  : 1940  MRN: [id number]  Today's Date: 10/16/2017  Onset of Illness/Injury or Date of Surgery Date: 10/10/17     Referring Physician: Tuan    Admit Date: 10/8/2017    Visit Dx:    ICD-10-CM ICD-9-CM   1. Closed fracture of right hip, initial encounter S72.001A 820.8   2. Multiple contusions T07.XXXA 924.8   3. Fall, initial encounter W19.XXXA E888.9   4. Difficulty walking R26.2 719.7     Patient Active Problem List   Diagnosis   • Severe sepsis   • Acute on chronic renal insufficiency   • Hypernatremia   • Dehydration   • Atrial fibrillation   • Nonsustained ventricular tachycardia   • Dementia   • Closed fracture of right hip   • CAD (coronary artery disease)   • Hypertension   • COPD (chronic obstructive pulmonary disease)   • DM (diabetes mellitus), type 2   • Dementia   • Atrial fibrillation   • History of DVT (deep vein thrombosis)   • DNR (do not resuscitate)   • Anticoagulated on Coumadin   • Iron deficiency anemia   • CKD (chronic kidney disease), stage III               Adult Rehabilitation Note       10/16/17 0824 10/13/17 0952       Rehab Assessment/Intervention    Discipline physical therapist  -AR physical therapy assistant  -JM     Document Type therapy note (daily note)  -AR therapy note (daily note)  -JM     Subjective Information complains of;fatigue  -AR complains of;fatigue  -JM     Patient Effort, Rehab Treatment fair  -AR      Precautions/Limitations fall precautions;anterior hip precautions- right  -AR hip precautions- right;fall precautions  -JM     Specific Treatment Considerations  Bipolar endo prec  -JM     Recorded by [AR] Blanca Pantoja, PT [JM] Nikki Middleton, PTA     Pain Assessment    Pain Assessment No/denies pain   does not appear in pain at rest or during exercises  -AR Unable to assess   confused, combative  -JM     Recorded by [AR] Blanca Pantoja, PT [JM] Nikki Middleton,  PTA     Cognitive Assessment/Intervention    Current Cognitive/Communication Assessment impaired  -AR      Orientation Status disoriented x 4  -AR      Follows Commands/Answers Questions 25% of the time;able to follow single-step instructions;needs cueing;needs increased time;needs repetition  -AR      Personal Safety severe impairment  -AR      Recorded by [AR] Blanca Pantoja, PT      Bed Mobility, Assessment/Treatment    Bed Mob, Supine to Sit, [place] not tested  -AR unable to perform;not appropriate to assess  -     Bed Mob, Sit to Supine, [place] not tested  -AR      Bed Mobility, Comment pt up in chair  -AR throwing blankets and pillow at therapist  -JM     Recorded by [AR] Blanca Pantoja, PT [JM] Nikki Middleton PTA     Transfer Assessment/Treatment    Transfers, Sit-Stand [place] not tested  -AR not appropriate to assess;unable to perform  -     Transfers, Stand-Sit [place] not tested  -AR      Transfer, Comment pt declined, repeating no   -AR too combative and cursing-unsafe to attempt  -JM     Recorded by [AR] Blanca Pantoja, PT [JM] Nikki Middleton PTA     Therapy Exercises    Bilateral Lower Extremities ankle pumps/circles;heel slides;LAQ;SAQ;hip abduction/adduction;10 reps;AAROM:;PROM:  -AR      Exercise Protocols  total hip  -JM     Total Hip Exercises  right:;10 reps;15 reps;with assist   said NO, but assisted w/exer   -JM     Recorded by [AR] Blanca Pantoja, PT [JM] Nikki Middleton PTA     Positioning and Restraints    Pre-Treatment Position sitting in chair/recliner  -AR in bed  -JM     Post Treatment Position chair  -AR      In Bed  supine;call light within reach;encouraged to call for assist;exit alarm on;notified nsg  -JM     In Chair reclined;sitting;call light within reach;encouraged to call for assist;exit alarm on  -AR      Recorded by [AR] Blanca Pantoja, PT [JM] Nikki Middleton PTA       User Key  (r) = Recorded By, (t) = Taken By, (c) = Cosigned By     Initials Name Effective Dates    ILDA Nikki Middleton, PTA 02/18/16 -     AR Blanca Pantoja, PT 06/27/16 -                 IP PT Goals       10/16/17 0829 10/11/17 1049       Bed Mobility PT LTG    Bed Mobility PT LTG, Date Established  10/11/17  -EM     Bed Mobility PT LTG, Time to Achieve 1 wk  -AR 3 days  -EM     Bed Mobility PT LTG, Activity Type  all bed mobility  -EM     Bed Mobility PT LTG, [place] Level minimum assist (75% patient effort)  -AR moderate assist (50% patient effort);2 person assist required  -EM     Bed Mobility PT LTG, Date Goal Reviewed 10/16/17  -AR      Bed Mobility PT LTG, Outcome goal revised  -AR      Transfer Training PT LTG    Transfer Training PT LTG, Date Established  10/11/17  -EM     Transfer Training PT LTG, Time to Achieve 1 wk  -AR 3 days  -EM     Transfer Training PT LTG, Activity Type  all transfers  -EM     Transfer Training PT LTG, [place] Level moderate assist (50% patient effort)  -AR moderate assist (50% patient effort)  -EM     Transfer Training PT  LTG, Date Goal Reviewed 10/16/17  -AR      Transfer Training PT LTG, Outcome goal revised  -AR      Gait Training PT LTG    Gait Training Goal PT LTG, Date Established  10/11/17  -EM     Gait Training Goal PT LTG, Time to Achieve 1 wk  -AR 3 days  -EM     Gait Training Goal PT LTG, [place] Level  moderate assist (50% patient effort);2 person assist required  -EM     Gait Training Goal PT LTG, Assist Device  walker, rolling  -EM     Gait Training Goal PT LTG, Distance to Achieve  10 feet   -EM     Gait Training Goal PT LTG, Date Goal Reviewed 10/16/17  -AR      Gait Training Goal PT LTG, Outcome goal ongoing  -AR        User Key  (r) = Recorded By, (t) = Taken By, (c) = Cosigned By    Initials Name Provider Type    EM Ayesha Reid, PT Physical Therapist    AR Blanca Pantoja, PT Physical Therapist          Physical Therapy Education     Title: PT OT SLP Therapies (Active)     Topic: Physical Therapy  (Active)     Point: Mobility training (Active)    Learning Progress Summary    Learner Readiness Method Response Comment Documented by Status   Patient Acceptance E NL  AR 10/16/17 0827 Active    Nonacceptance E,D NL pt did assist w/exer, then became combative  10/13/17 1014 Active    Acceptance E NR  EM 10/12/17 1653 Active    Acceptance E NR  EM 10/11/17 1048 Active               Point: Home exercise program (Active)    Learning Progress Summary    Learner Readiness Method Response Comment Documented by Status   Patient Acceptance E NL  AR 10/16/17 0827 Active    Nonacceptance E,D NL pt did assist w/exer, then became combative  10/13/17 1014 Active    Acceptance E NR  EM 10/11/17 1048 Active               Point: Body mechanics (Active)    Learning Progress Summary    Learner Readiness Method Response Comment Documented by Status   Patient Acceptance E NL  AR 10/16/17 0827 Active    Nonacceptance E,D NL pt did assist w/exer, then became combative  10/13/17 1014 Active               Point: Precautions (Active)    Learning Progress Summary    Learner Readiness Method Response Comment Documented by Status   Patient Acceptance E NL  AR 10/16/17 0827 Active    Nonacceptance E,D NL pt did assist w/exer, then became combative  10/13/17 1014 Active                      User Key     Initials Effective Dates Name Provider Type Discipline     12/01/15 -  Ayesha Reid, PT Physical Therapist PT     02/18/16 -  Nikki Middleton, PTA Physical Therapy Assistant PT    AR 06/27/16 -  Blanca Pantoja, PT Physical Therapist PT                    PT Recommendation and Plan  Anticipated Discharge Disposition: skilled nursing facility  Planned Therapy Interventions: bed mobility training, gait training, home exercise program, transfer training  PT Frequency: 5 times/wk  Plan of Care Review  Plan Of Care Reviewed With: patient  Outcome Summary/Follow up Plan: Pt confused but able to participate in B LE exercises w/  assist  for strengthening/ROM.  Pt up in chair but repeating no when discussing standing up again.    Recommend DC to [facility].              Outcome Measures       10/16/17 0800 10/13/17 1000       How much help from another person do you currently need...    Turning from your back to your side while in flat bed without using bedrails? 2  -AR 2  -JM     Moving from lying on back to sitting on the side of a flat bed without bedrails? 2  -AR 1  -JM     Moving to and from a bed to a chair (including a wheelchair)? 2  -AR 1  -JM     Standing up from a chair using your arms (e.g., wheelchair, bedside chair)? 1  -AR 1  -JM     Climbing 3-5 steps with a railing? 1  -AR 1  -JM     To walk in hospital room? 1  -AR 1  -JM     AM-PAC 6 Clicks Score 9  -AR 7  -JM       User Key  (r) = Recorded By, (t) = Taken By, (c) = Cosigned By    Initials Name Provider Type    ILDA Middleton PTA Physical Therapy Assistant    HARSHA Pantoja PT Physical Therapist           Time Calculation:         PT Charges       10/16/17 0829          Time Calculation    Start Time 0818  -AR      Stop Time 0829  -AR      Time Calculation (min) 11 min  -AR      PT Received On 10/16/17  -AR      PT - Next Appointment 10/17/17  -AR      PT Goal Re-Cert Due Date 10/23/17  -AR        User Key  (r) = Recorded By, (t) = Taken By, (c) = Cosigned By    Initials Name Provider Type    HARSHA Pantoja PT Physical Therapist          Therapy Charges for Today     Code Description Service Date Service Provider Modifiers Qty    30741050094 HC PT THER PROC EA 15 MIN 10/16/2017 Blanca Pantoja PT GP 1    91317750522 HC PT THER SUPP EA 15 MIN 10/16/2017 Blanca Pantoja PT GP 1          PT G-Codes  Outcome Measure Options: AM-PAC 6 Clicks Basic Mobility (PT)    Blanca Pantoja PT  10/16/2017

## 2017-10-16 NOTE — PROGRESS NOTES
[facility]     LOS: 8 days     Subjective:  No complaints  Very pleasant    Objective:    Vital Signs:  Temp:  [97 °F (36.1 °C)-98.4 °F (36.9 °C)] 97.9 °F (36.6 °C)  Heart Rate:  [] 83  Resp:  [18-22] 18  BP: (108-137)/(64-86) 122/64    General Appearance: no acute distress, appears comfortable  Heart: regular rate and rhythm  Lungs: clear to auscultation bilaterally, respirations unlabored, good air entry  Abdomen: soft, non-tender, no guarding, no rebound, non-distended  Extremities: no edema, no cyanosis  Neurology: speech slightly unclear but fairly well understood, CN grossly normal, oriented x1  Psychiatric: normal mood and affect    Results Review:    Glucose   Date Value Ref Range Status   10/15/2017 100 (H) 65 - 99 mg/dL Final       Results from last 7 days  Lab Units 10/15/17  0613   WBC 10*3/mm3 5.09   HEMOGLOBIN g/dL 8.7*   HEMATOCRIT % 29.9*   PLATELETS 10*3/mm3 165       Results from last 7 days  Lab Units 10/15/17  0613   SODIUM mmol/L 143   POTASSIUM mmol/L 3.9   CHLORIDE mmol/L 110*   CO2 mmol/L 21.1*   BUN mg/dL 23   CREATININE mg/dL 1.05   CALCIUM mg/dL 9.6   GLUCOSE mg/dL 100*       Results from last 7 days  Lab Units 10/16/17  0410   INR  1.40*                                       I have reviewed daily medications and changes in CPOE    Scheduled meds    aspirin 81 mg Oral Daily   Divalproex Sodium 250 mg Oral TID   enoxaparin 30 mg Subcutaneous Q24H   ferrous sulfate 325 mg Oral BID   gabapentin 300 mg Oral TID   metoprolol tartrate 25 mg Oral BID   multivitamin with minerals 1 tablet Oral Daily   traZODone 100 mg Oral Nightly   warfarin 7.5 mg Oral Daily          PRN meds  •  acetaminophen  •  bisacodyl  •  dextrose  •  dextrose  •  docusate sodium  •  glucagon (human recombinant)  •  hydrALAZINE  •  HYDROcodone-acetaminophen  •  LORazepam  •  Morphine **AND** naloxone  •  nitroglycerin  •  OLANZapine  •  sodium chloride      Principal Problem:    Closed  fracture of right hip  Active Problems:    CAD (coronary artery disease)    Hypertension    COPD (chronic obstructive pulmonary disease)    DM (diabetes mellitus), type 2    Dementia    Atrial fibrillation    History of DVT (deep vein thrombosis)    DNR (do not resuscitate)    Anticoagulated on Coumadin    Iron deficiency anemia    CKD (chronic kidney disease), stage III        Assessment/Plan:    1. Right femoral neck fracture 10/10/17 .  Disposition is problematic.  No accepting facility has been found.  He is calm again today.  Bladder scan is elevated but ok    2.  Chronic A. fib.  INR still low.  Coumadin increased to 9 mg.  Start Lovenox 30 mg subcutaneous daily.  Recheck INR in a.m.    3.  Chronic kidney disease stage III.  Has been stable.    4.  Diabetes mellitus type 2, no medications needed.  No Accu-Cheks required.    5.  Sick euthyroid with normal free T4    6. Dementia - depokote and zyprexa    D/w ccp    Fadi Bansal MD  10/16/17  6:20 PM

## 2017-10-16 NOTE — PLAN OF CARE
Problem: Patient Care Overview (Adult)  Goal: Plan of Care Review  Outcome: Ongoing (interventions implemented as appropriate)    10/16/17 0333   Coping/Psychosocial Response Interventions   Plan Of Care Reviewed With patient   Patient Care Overview   Progress progress toward functional goals as expected   Outcome Evaluation   Outcome Summary/Follow up Plan PATIENT CONFUSED. PATIENT CALM DURING THIS SHIFT. MEDS CRUSED . INCONTI. EDUCATION PROVIDED ON BLOOD SUGAR MONITORING AND REDUCTION OF FAT AND SODIUM INTAKE.        Goal: Adult Individualization and Mutuality  Outcome: Ongoing (interventions implemented as appropriate)  Goal: Discharge Needs Assessment  Outcome: Ongoing (interventions implemented as appropriate)    Problem: Fractured Hip (Adult)  Goal: Signs and Symptoms of Listed Potential Problems Will be Absent or Manageable (Fractured Hip)  Outcome: Ongoing (interventions implemented as appropriate)    Problem: Fall Risk (Adult)  Goal: Absence of Falls  Outcome: Outcome(s) achieved Date Met:  10/16/17    Problem: Skin Integrity Impairment, Risk/Actual (Adult)  Goal: Skin Integrity/Wound Healing  Outcome: Ongoing (interventions implemented as appropriate)

## 2017-10-16 NOTE — PLAN OF CARE
Problem: Inpatient Physical Therapy  Goal: Bed Mobility Goal LTG- PT    10/11/17 1049 10/16/17 0829   Bed Mobility PT LTG   Bed Mobility PT LTG, Date Established 10/11/17 --    Bed Mobility PT LTG, Time to Achieve --  1 wk   Bed Mobility PT LTG, Activity Type all bed mobility --    Bed Mobility PT LTG, [place] Level --  minimum assist (75% patient effort)   Bed Mobility PT LTG, Date Goal Reviewed --  10/16/17   Bed Mobility PT LTG, Outcome --  goal revised       Goal: Transfer Training Goal 1 LTG- PT    10/11/17 1049 10/16/17 0829   Transfer Training PT LTG   Transfer Training PT LTG, Date Established 10/11/17 --    Transfer Training PT LTG, Time to Achieve --  1 wk   Transfer Training PT LTG, Activity Type all transfers --    Transfer Training PT LTG, [place] Level --  moderate assist (50% patient effort)   Transfer Training PT LTG, Date Goal Reviewed --  10/16/17   Transfer Training PT LTG, Outcome --  goal revised       Goal: Gait Training Goal LTG- PT    10/11/17 1049 10/16/17 0829   Gait Training PT LTG   Gait Training Goal PT LTG, Date Established 10/11/17 --    Gait Training Goal PT LTG, Time to Achieve --  1 wk   Gait Training Goal PT LTG, [place] Level moderate assist (50% patient effort);2 person assist required --    Gait Training Goal PT LTG, Assist Device walker, rolling --    Gait Training Goal PT LTG, Distance to Achieve 10 feet  --    Gait Training Goal PT LTG, Date Goal Reviewed --  10/16/17   Gait Training Goal PT LTG, Outcome --  goal ongoing

## 2017-10-16 NOTE — PLAN OF CARE
Problem: Patient Care Overview (Adult)  Goal: Plan of Care Review  Outcome: Ongoing (interventions implemented as appropriate)    10/15/17 0332 10/15/17 2032   Coping/Psychosocial Response Interventions   Plan Of Care Reviewed With patient --    Patient Care Overview   Progress --  progress toward functional goals is gradual   Outcome Evaluation   Outcome Summary/Follow up Plan --  Pt was still disorientedX4 today. Vitals WNL, dressing CDI. The pt slept most of this shift but was able to partially feed himself dinner. Still awaiting pre-cert for d/c. Will continue to monitor bp r/t history of HTN.       Goal: Adult Individualization and Mutuality  Outcome: Ongoing (interventions implemented as appropriate)  Goal: Discharge Needs Assessment  Outcome: Ongoing (interventions implemented as appropriate)    Problem: Fractured Hip (Adult)  Goal: Signs and Symptoms of Listed Potential Problems Will be Absent or Manageable (Fractured Hip)  Outcome: Ongoing (interventions implemented as appropriate)    Problem: Fall Risk (Adult)  Goal: Absence of Falls  Outcome: Ongoing (interventions implemented as appropriate)    Problem: Skin Integrity Impairment, Risk/Actual (Adult)  Goal: Skin Integrity/Wound Healing  Outcome: Ongoing (interventions implemented as appropriate)

## 2017-10-16 NOTE — DISCHARGE PLACEMENT REQUEST
"Alanna Davis (77 y.o. Male)     Date of Birth Social Security Number Address Home Phone MRN    1940  [address] [facility] [address] [phone] [id number]    Buddhist Marital Status          Denominational Single       Admission Date Admission Type Admitting Provider Attending Provider Department, Room/Bed    10/8/17 Emergency [place], MD Rolf Herman Stephen A, MD [address], P879/1    Discharge Date Discharge Disposition Discharge Destination                      Attending Provider: Fadi Bansal MD     Allergies:  Bee Venom, Iodine, Latex, Propoxyphene    Isolation:  None   Infection:  None   Code Status:  FULL    Ht:  71\" (180.3 cm)   Wt:  190 lb (86.2 kg)    Admission Cmt:  None   Principal Problem:  Closed fracture of right hip [S72.001A]                 Active Insurance as of 10/8/2017     Primary Coverage     Payor Plan Insurance Group Employer/Plan Group    MEDICARE MEDICARE A & B      Payor Plan Address Payor Plan Phone Number Effective From Effective To    [address] [phone] 10/1/1996     [place] [address]       Subscriber Name Subscriber Birth Date Member ID       ALANNA DAVIS 1940 [id number]                 Emergency Contacts      (Rel.) Home Phone Work Phone Mobile Phone    Diana Kerr (Power of ) [phone] -- [phone]    Kimmy Kerr (Grandchild) -- -- [phone]              "

## 2017-10-16 NOTE — PLAN OF CARE
Problem: Patient Care Overview (Adult)  Goal: Plan of Care Review    10/16/17 0827   Coping/Psychosocial Response Interventions   Plan Of Care Reviewed With patient   Outcome Evaluation   Outcome Summary/Follow up Plan Pt confused but able to participate in B LE exercises w/ assist for strengthening/ROM. Pt up in chair but repeating no when discussing standing up again. Recommend DC to [facility].

## 2017-10-17 NOTE — PROGRESS NOTES
Continued Stay Note  [facility]     Patient Name: Darrian Davis  MRN: [id number]  Today's Date: 10/17/2017    Admit Date: 10/8/2017          Discharge Plan       10/17/17 1619    Case Management/Social Work Plan    Plan discharge to LTC     Additional Comments CCP received email from Alex Colunga. Patient was approved for [facility]. CCP reached out to daughter, who was currently still at the facility. CCP still needs to confirm placement with daughter and arrange transportation as needed. CCP informed Shayna of approval. CCP will continue to [facility] for discharge placement to [facility]. Lisbeth Mayer CSW       10/17/17 1441    Case Management/Social Work Plan    Plan discharge to LTC facility     Patient/Family In Agreement With Plan yes    Additional Comments CCP spoke with daughter, Diana Kerr [phone]. daughter stated patient had lived with her for 6 years until August. In August, patient went to [facility] in [place] [facility]. Patient also went to the [facility] and [facility]. daughter was looking for short term placement and for patient to go back to [facility]. Daughter stated she would rather him go to LTC upon discharge. Daughter states they can private pay if needed. Daughter has been paying patient's bills . Daughter believes patient has about $100,000 left for private pay. Daughter is going to [facility] this afternoon at 3 PM. Numerous referrals were made for discharge placements and received denials from several facilities. Denials marked in discharge placement section in epic. CCP made referral to [facility] and [facility] that are still pending. Will discuss with daughter [place] referrals if needed. CCP will follow up with both facilities for acceptance or denial. CCP will continue to follow for long term care placement of choice  and transportation. Lisbeth Mayer CSW               Discharge Codes      None            UDNG Ramirez

## 2017-10-17 NOTE — NURSING NOTE
"Dr. Bansal notified of patient not taking pills, especially Coumadin. Dr. Bansal states \"just keep trying to get him to take it.\"   "

## 2017-10-17 NOTE — PLAN OF CARE
Problem: Patient Care Overview (Adult)  Goal: Plan of Care Review    10/17/17 1502   Coping/Psychosocial Response Interventions   Plan Of Care Reviewed With patient   Patient Care Overview   Progress improving   Outcome Evaluation   Outcome Summary/Follow up Plan Patient confused but agreeable to therapy session this date. Assist x 2 required for bed<>chair transfer. Ambulated 2 feet with min A x 2 and use of RW. Maximal cues for instruction and initiation of task. Will continue to address deficits and improve level of independence as able.

## 2017-10-17 NOTE — DISCHARGE PLACEMENT REQUEST
"Alanna Davis (77 y.o. Male)     Date of Birth Social Security Number Address Home Phone MRN    1940  [address] [facility] [address] [phone] [id number]    Religious Marital Status          Alevism Single       Admission Date Admission Type Admitting Provider Attending Provider Department, Room/Bed    10/8/17 Emergency [place], MD Rolf Herman Stephen A, MD [address], P879/1    Discharge Date Discharge Disposition Discharge Destination                      Attending Provider: Fadi Bansal MD     Allergies:  Bee Venom, Iodine, Latex, Propoxyphene    Isolation:  None   Infection:  None   Code Status:  FULL    Ht:  71\" (180.3 cm)   Wt:  190 lb (86.2 kg)    Admission Cmt:  None   Principal Problem:  Closed fracture of right hip [S72.001A]                 Active Insurance as of 10/8/2017     Primary Coverage     Payor Plan Insurance Group Employer/Plan Group    MEDICARE MEDICARE A & B      Payor Plan Address Payor Plan Phone Number Effective From Effective To    [address] [phone] 10/1/1996     [place] [address]       Subscriber Name Subscriber Birth Date Member ID       ALANNA DAVIS 1940 [id number]                 Emergency Contacts      (Rel.) Home Phone Work Phone Mobile Phone    Diana Kerr (Power of ) [phone] -- [phone]    Kimmy Kerr (Grandchild) -- -- [phone]            "

## 2017-10-17 NOTE — DISCHARGE PLACEMENT REQUEST
"Alanna Davis (77 y.o. Male)     Date of Birth Social Security Number Address Home Phone MRN    1940  [address] [facility] [address] [phone] [id number]    Roman Catholic Marital Status          Alevism Single       Admission Date Admission Type Admitting Provider Attending Provider Department, Room/Bed    10/8/17 Emergency [place], MD Rolf Herman Stephen A, MD [address], P879/1    Discharge Date Discharge Disposition Discharge Destination                      Attending Provider: Fadi Bansal MD     Allergies:  Bee Venom, Iodine, Latex, Propoxyphene    Isolation:  None   Infection:  None   Code Status:  FULL    Ht:  71\" (180.3 cm)   Wt:  190 lb (86.2 kg)    Admission Cmt:  None   Principal Problem:  Closed fracture of right hip [S72.001A]                 Active Insurance as of 10/8/2017     Primary Coverage     Payor Plan Insurance Group Employer/Plan Group    MEDICARE MEDICARE A & B      Payor Plan Address Payor Plan Phone Number Effective From Effective To    [address] [phone] 10/1/1996     [place] [address]       Subscriber Name Subscriber Birth Date Member ID       ALANNA DAVIS 1940 [id number]                 Emergency Contacts      (Rel.) Home Phone Work Phone Mobile Phone    Diana Kerr (Power of ) [phone] -- [phone]    Kimmy Kerr (Grandchild) -- -- [phone]              "

## 2017-10-17 NOTE — PLAN OF CARE
Problem: Patient Care Overview (Adult)  Goal: Plan of Care Review  Outcome: Ongoing (interventions implemented as appropriate)    10/16/17 2030   Coping/Psychosocial Response Interventions   Plan Of Care Reviewed With patient   Patient Care Overview   Progress progress toward functional goals as expected   Outcome Evaluation   Outcome Summary/Follow up Plan Pt. confused (Alert and oriented to 1). Would not allow RN to changed dressing (C/D/I). Patient was compliant with taking meds today and was not combative, except for not allowing dressing change. CCP acknowledged they are working on placement for patient to dc to.        Goal: Adult Individualization and Mutuality  Outcome: Ongoing (interventions implemented as appropriate)  Goal: Discharge Needs Assessment  Outcome: Ongoing (interventions implemented as appropriate)    Problem: Fractured Hip (Adult)  Goal: Signs and Symptoms of Listed Potential Problems Will be Absent or Manageable (Fractured Hip)  Outcome: Ongoing (interventions implemented as appropriate)    Problem: Skin Integrity Impairment, Risk/Actual (Adult)  Goal: Skin Integrity/Wound Healing  Outcome: Ongoing (interventions implemented as appropriate)

## 2017-10-17 NOTE — PROGRESS NOTES
[facility]     LOS: 9 days     Subjective:  No complaints  Very pleasant now  + bm   and good urine output  Got up twice today  Eating well    Objective:    Vital Signs:  Temp:  [97.5 °F (36.4 °C)-99.8 °F (37.7 °C)] 99.8 °F (37.7 °C)  Heart Rate:  [78-93] 93  Resp:  [17-18] 18  BP: (115-120)/(72-75) 118/72    General Appearance: no acute distress, appears comfortable  Heart: regular rate and rhythm  Lungs: clear to auscultation bilaterally, respirations unlabored, good air entry  Abdomen: soft, non-tender, no guarding, no rebound, non-distended  Extremities: no edema, no cyanosis  Neurology: speech slightly unclear but fairly well understood, CN grossly normal, oriented x1  Psychiatric: normal mood and affect    Results Review:    Glucose   Date Value Ref Range Status   10/15/2017 100 (H) 65 - 99 mg/dL Final       Results from last 7 days  Lab Units 10/15/17  0613   WBC 10*3/mm3 5.09   HEMOGLOBIN g/dL 8.7*   HEMATOCRIT % 29.9*   PLATELETS 10*3/mm3 165       Results from last 7 days  Lab Units 10/15/17  0613   SODIUM mmol/L 143   POTASSIUM mmol/L 3.9   CHLORIDE mmol/L 110*   CO2 mmol/L 21.1*   BUN mg/dL 23   CREATININE mg/dL 1.05   CALCIUM mg/dL 9.6   GLUCOSE mg/dL 100*       Results from last 7 days  Lab Units 10/17/17  0356   INR  1.84*                                       I have reviewed daily medications and changes in CPOE    Scheduled meds    aspirin 81 mg Oral Daily   Divalproex Sodium 250 mg Oral TID   enoxaparin 30 mg Subcutaneous Q24H   ferrous sulfate 325 mg Oral BID   gabapentin 300 mg Oral TID   metoprolol tartrate 25 mg Oral BID   multivitamin with minerals 1 tablet Oral Daily   traZODone 100 mg Oral Nightly   warfarin 9 mg Oral Daily          PRN meds  •  acetaminophen  •  bisacodyl  •  dextrose  •  dextrose  •  docusate sodium  •  glucagon (human recombinant)  •  hydrALAZINE  •  HYDROcodone-acetaminophen  •  LORazepam  •  Morphine **AND** naloxone  •  nitroglycerin  •   OLANZapine  •  sodium chloride      Principal Problem:    Closed fracture of right hip  Active Problems:    CAD (coronary artery disease)    Hypertension    COPD (chronic obstructive pulmonary disease)    DM (diabetes mellitus), type 2    Dementia    Atrial fibrillation    History of DVT (deep vein thrombosis)    DNR (do not resuscitate)    Anticoagulated on Coumadin    Iron deficiency anemia    CKD (chronic kidney disease), stage III        Assessment/Plan:    1. Right femoral neck fracture 10/10/17.  He is calm again today.  Bladder scan is ok    2.  Chronic A. fib.  INR still low.  Coumadin increased to 9 mg.  Start Lovenox 30 mg subcutaneous daily.  Recheck INR in a.m.    3.  Chronic kidney disease stage III.  Has been stable.    4.  Diabetes mellitus type 2, no medications needed.  No Accu-Cheks required.    5.  Sick euthyroid with normal free T4    6. Dementia - depokote and zyprexa    D/w ccp and nursing    Fadi Bansal MD  10/17/17  4:47 PM

## 2017-10-17 NOTE — PLAN OF CARE
Problem: Patient Care Overview (Adult)  Goal: Plan of Care Review  Outcome: Ongoing (interventions implemented as appropriate)    10/17/17 0431   Coping/Psychosocial Response Interventions   Plan Of Care Reviewed With patient   Patient Care Overview   Progress improving   Outcome Evaluation   Outcome Summary/Follow up Plan Pt confused through the shift, refusing care at times. Pain meds given at request x1. Voiding per brief. VSS. Education to be reinforced on HTN management with BP monitoring and meds.        Goal: Adult Individualization and Mutuality  Outcome: Ongoing (interventions implemented as appropriate)  Goal: Discharge Needs Assessment  Outcome: Ongoing (interventions implemented as appropriate)    Problem: Fractured Hip (Adult)  Goal: Signs and Symptoms of Listed Potential Problems Will be Absent or Manageable (Fractured Hip)  Outcome: Ongoing (interventions implemented as appropriate)    10/17/17 0431   Fractured Hip   Problems Assessed (Fractured Hip) all   Problems Present (Fractured Hip) pain;functional deficit/self-care deficit         Problem: Fall Risk (Adult)  Goal: Absence of Falls  Outcome: Ongoing (interventions implemented as appropriate)    10/17/17 0431   Fall Risk (Adult)   Absence of Falls achieves outcome         Problem: Skin Integrity Impairment, Risk/Actual (Adult)  Goal: Skin Integrity/Wound Healing  Outcome: Ongoing (interventions implemented as appropriate)    10/17/17 0431   Skin Integrity Impairment, Risk/Actual (Adult)   Skin Integrity/Wound Healing achieves outcome

## 2017-10-17 NOTE — THERAPY TREATMENT NOTE
Acute Care - Physical Therapy Treatment Note  [facility]     Patient Name: Darrian Davis  : 1940  MRN: [id number]  Today's Date: 10/17/2017  Onset of Illness/Injury or Date of Surgery Date: 10/10/17     Referring Physician: Tuan    Admit Date: 10/8/2017    Visit Dx:    ICD-10-CM ICD-9-CM   1. Closed fracture of right hip, initial encounter S72.001A 820.8   2. Multiple contusions T07.XXXA 924.8   3. Fall, initial encounter W19.XXXA E888.9   4. Difficulty walking R26.2 719.7     Patient Active Problem List   Diagnosis   • Severe sepsis   • Acute on chronic renal insufficiency   • Hypernatremia   • Dehydration   • Atrial fibrillation   • Nonsustained ventricular tachycardia   • Dementia   • Closed fracture of right hip   • CAD (coronary artery disease)   • Hypertension   • COPD (chronic obstructive pulmonary disease)   • DM (diabetes mellitus), type 2   • Dementia   • Atrial fibrillation   • History of DVT (deep vein thrombosis)   • DNR (do not resuscitate)   • Anticoagulated on Coumadin   • Iron deficiency anemia   • CKD (chronic kidney disease), stage III               Adult Rehabilitation Note       10/17/17 1459 10/16/17 0824       Rehab Assessment/Intervention    Discipline physical therapist  -MA physical therapist  -AR     Document Type therapy note (daily note)  -MA therapy note (daily note)  -AR     Subjective Information agree to therapy;complains of;weakness;fatigue  -MA complains of;fatigue  -AR     Patient Effort, Rehab Treatment fair  -MA fair  -AR     Precautions/Limitations fall precautions;anterior hip precautions- right  -MA fall precautions;anterior hip precautions- right  -AR     Recorded by [MA] Katia Grimaldo, PT [AR] Blanca Pantoja, PT     Pain Assessment    Pain Assessment --   Denies at rest, grimacing with mobility noted  -MA No/denies pain   does not appear in pain at rest or during exercises  -AR     Recorded by [MA] Katia Grimaldo, PT [AR] Blanca Pantoja, PT      Cognitive Assessment/Intervention    Current Cognitive/Communication Assessment impaired  -MA impaired  -AR     Orientation Status oriented to;person;place  -MA disoriented x 4  -AR     Follows Commands/Answers Questions 25% of the time;able to follow single-step instructions;needs cueing;needs repetition  -MA 25% of the time;able to follow single-step instructions;needs cueing;needs increased time;needs repetition  -AR     Personal Safety moderate impairment;decreased awareness, need for assist;decreased awareness, need for safety;decreased insight to deficits  -MA severe impairment  -AR     Personal Safety Interventions fall prevention program maintained;gait belt;nonskid shoes/slippers when out of bed  -MA      Recorded by [MA] Katia Grimaldo, PT [AR] Blanca Pantoja, PT     Bed Mobility, Assessment/Treatment    Bed Mobility, Assistive Device head of bed elevated  -MA      Bed Mobility, Scoot/Bridge, [place] moderate assist (50% patient effort);2 person assist required  -MA      Bed Mob, Supine to Sit, [place] maximum assist (25% patient effort);2 person assist required;verbal cues required  -MA not tested  -AR     Bed Mob, Sit to Supine, [place]  not tested  -AR     Bed Mobility, Safety Issues decreased use of legs for bridging/pushing;impaired trunk control for bed mobility  -MA      Bed Mobility, Impairments strength decreased;impaired balance;pain  -MA      Bed Mobility, Comment VC for hand placement to enhance independence  -MA pt up in chair  -AR     Recorded by [MA] Katia Grimaldo, PT [AR] Blanca Pantoja, PT     Transfer Assessment/Treatment    Transfers, Sit-Stand [place] maximum assist (25% patient effort);2 person assist required  -MA not tested  -AR     Transfers, Stand-Sit [place] moderate assist (50% patient effort);2 person assist required  -MA not tested  -AR     Transfers, Sit-Stand-Sit, Assist Device rolling walker  -MA      Transfer, Safety Issues sequencing  ability decreased;loses balance backward;knees buckling;balance decreased during turns  -MA      Transfer, Impairments strength decreased;impaired balance  -MA      Transfer, Comment VC for instruction and initiation to task  -MA pt declined, repeating no   -AR     Recorded by [MA] Katia Grimaldo PT [AR] Blanca Pantoja PT     Gait Assessment/Treatment    Gait, [place] Level minimum assist (75% patient effort);verbal cues required;2 person assist required  -MA      Gait, Assistive Device rolling walker  -MA      Gait, Distance (Feet) 2  -MA      Gait, Gait Pattern Analysis swing-to gait  -MA      Gait, Gait Deviations antalgic;jena decreased;step length decreased  -MA      Gait, Safety Issues loses balance backward;weight-shifting ability decreased;sequencing ability decreased  -MA      Gait, Impairments strength decreased;impaired balance  -MA      Gait, Comment VC for sequencing  -MA      Recorded by [MA] Katia Grimaldo PT      Therapy Exercises    Bilateral Lower Extremities  ankle pumps/circles;heel slides;LAQ;SAQ;hip abduction/adduction;10 reps;AAROM:;PROM:  -AR     Recorded by  [AR] Blanca Pantoja PT     Positioning and Restraints    Pre-Treatment Position in bed  -MA sitting in chair/recliner  -AR     Post Treatment Position chair  -MA chair  -AR     In Chair notified nsg;sitting;call light within reach;encouraged to call for assist;exit alarm on;legs elevated  -MA reclined;sitting;call light within reach;encouraged to call for assist;exit alarm on  -AR     Recorded by [MA] Katia Grimaldo, PT [AR] Blanca Pantoja PT       User Key  (r) = Recorded By, (t) = Taken By, (c) = Cosigned By    Initials Name Effective Dates    AR Blanca Pantoja, PT 06/27/16 -     PING Grimaldo, PT 12/13/16 -                 IP PT Goals       10/16/17 0829 10/11/17 1049       Bed Mobility PT LTG    Bed Mobility PT LTG, Date Established  10/11/17  -EM     Bed Mobility PT LTG, Time to Achieve 1 wk  -AR  3 days  -EM     Bed Mobility PT LTG, Activity Type  all bed mobility  -EM     Bed Mobility PT LTG, [place] Level minimum assist (75% patient effort)  -AR moderate assist (50% patient effort);2 person assist required  -EM     Bed Mobility PT LTG, Date Goal Reviewed 10/16/17  -AR      Bed Mobility PT LTG, Outcome goal revised  -AR      Transfer Training PT LTG    Transfer Training PT LTG, Date Established  10/11/17  -EM     Transfer Training PT LTG, Time to Achieve 1 wk  -AR 3 days  -EM     Transfer Training PT LTG, Activity Type  all transfers  -EM     Transfer Training PT LTG, [place] Level moderate assist (50% patient effort)  -AR moderate assist (50% patient effort)  -EM     Transfer Training PT  LTG, Date Goal Reviewed 10/16/17  -AR      Transfer Training PT LTG, Outcome goal revised  -AR      Gait Training PT LTG    Gait Training Goal PT LTG, Date Established  10/11/17  -EM     Gait Training Goal PT LTG, Time to Achieve 1 wk  -AR 3 days  -EM     Gait Training Goal PT LTG, [place] Level  moderate assist (50% patient effort);2 person assist required  -EM     Gait Training Goal PT LTG, Assist Device  walker, rolling  -EM     Gait Training Goal PT LTG, Distance to Achieve  10 feet   -EM     Gait Training Goal PT LTG, Date Goal Reviewed 10/16/17  -AR      Gait Training Goal PT LTG, Outcome goal ongoing  -AR        User Key  (r) = Recorded By, (t) = Taken By, (c) = Cosigned By    Initials Name Provider Type    EM Ayesha Reid, PT Physical Therapist    AR Blanca Pantoja, PT Physical Therapist          Physical Therapy Education     Title: PT OT SLP Therapies (Active)     Topic: Physical Therapy (Active)     Point: Mobility training (Active)    Learning Progress Summary    Learner Readiness Method Response Comment Documented by Status   Patient Acceptance E NL  MA 10/17/17 7183 Active    Acceptance E NL  AR 10/16/17 0820 Active    Nonacceptance E,D NL pt did assist w/exer, then became combative JM  10/13/17 1014 Active    Acceptance E NR  EM 10/12/17 1653 Active    Acceptance E NR  EM 10/11/17 1048 Active               Point: Home exercise program (Active)    Learning Progress Summary    Learner Readiness Method Response Comment Documented by Status   Patient Acceptance E NL  MA 10/17/17 1503 Active    Acceptance E NL  AR 10/16/17 0827 Active    Nonacceptance E,D NL pt did assist w/exer, then became combative  10/13/17 1014 Active    Acceptance E NR  EM 10/11/17 1048 Active               Point: Body mechanics (Active)    Learning Progress Summary    Learner Readiness Method Response Comment Documented by Status   Patient Acceptance E NL  MA 10/17/17 1503 Active    Acceptance E NL  AR 10/16/17 0827 Active    Nonacceptance E,D NL pt did assist w/exer, then became combative  10/13/17 1014 Active               Point: Precautions (Active)    Learning Progress Summary    Learner Readiness Method Response Comment Documented by Status   Patient Acceptance E NL  MA 10/17/17 1503 Active    Acceptance E NL  AR 10/16/17 0827 Active    Nonacceptance E,D NL pt did assist w/exer, then became combative  10/13/17 1014 Active                      User Key     Initials Effective Dates Name Provider Type Discipline     12/01/15 -  Ayesha Reid, PT Physical Therapist PT     02/18/16 -  Nikki Middleton, PTA Physical Therapy Assistant PT    AR 06/27/16 -  Blanca Pantoja, PT Physical Therapist PT    MA 12/13/16 -  Katia Grimaldo, PT Physical Therapist PT                    PT Recommendation and Plan  Anticipated Discharge Disposition: skilled nursing facility  Planned Therapy Interventions: bed mobility training, gait training, home exercise program, transfer training  PT Frequency: 5 times/wk  Plan of Care Review  Plan Of Care Reviewed With: (P) patient  Progress: (P) improving  Outcome Summary/Follow up Plan: (P) Patient confused but agreeable to therapy session this date. Assist x 2 required for bed<>chair  transfer. Ambulated 2 feet with min A x 2 and use of RW. Maximal cues for instruction and initiation of task. Will continue to address deficits and improve level of independence as able.          Outcome Measures       10/17/17 1500 10/16/17 0800       How much help from another person do you currently need...    Turning from your back to your side while in flat bed without using bedrails? 2  -MA 2  -AR     Moving from lying on back to sitting on the side of a flat bed without bedrails? 2  -MA 2  -AR     Moving to and from a bed to a chair (including a wheelchair)? 1  -MA 2  -AR     Standing up from a chair using your arms (e.g., wheelchair, bedside chair)? 1  -MA 1  -AR     Climbing 3-5 steps with a railing? 1  -MA 1  -AR     To walk in hospital room? 1  -MA 1  -AR     AM-PAC 6 Clicks Score 8  -MA 9  -AR     Functional Assessment    Outcome Measure Options AM-PAC 6 Clicks Basic Mobility (PT)  -MA        User Key  (r) = Recorded By, (t) = Taken By, (c) = Cosigned By    Initials Name Provider Type    AR Blanca Pantoja, PT Physical Therapist    PING Grimaldo PT Physical Therapist           Time Calculation:         PT Charges       10/17/17 1505          Time Calculation    Start Time 1245  -MA      Stop Time 1258  -MA      Time Calculation (min) 13 min  -MA      PT Received On 10/17/17  -MA      PT - Next Appointment 10/18/17  -MA        User Key  (r) = Recorded By, (t) = Taken By, (c) = Cosigned By    Initials Name Provider Type    PING Grimaldo PT Physical Therapist          Therapy Charges for Today     Code Description Service Date Service Provider Modifiers Qty    58218843022 HC PT THER PROC EA 15 MIN 10/17/2017 Katia Grimaldo, PT GP 1    96763422635 HC PT THER SUPP EA 15 MIN 10/17/2017 Katia Grimaldo, PT GP 1          PT G-Codes  Outcome Measure Options: AM-PAC 6 Clicks Basic Mobility (PT)    Katia Grimaldo PT  10/17/2017

## 2017-10-17 NOTE — DISCHARGE PLACEMENT REQUEST
"Alanna Davis (77 y.o. Male)     Date of Birth Social Security Number Address Home Phone MRN    1940  [address] [facility] [address] [phone] [id number]    Quaker Marital Status          Zoroastrian Single       Admission Date Admission Type Admitting Provider Attending Provider Department, Room/Bed    10/8/17 Emergency [place], MD Rolf Herman Stephen A, MD [address], P879/1    Discharge Date Discharge Disposition Discharge Destination                      Attending Provider: Fadi Bansal MD     Allergies:  Bee Venom, Iodine, Latex, Propoxyphene    Isolation:  None   Infection:  None   Code Status:  FULL    Ht:  71\" (180.3 cm)   Wt:  190 lb (86.2 kg)    Admission Cmt:  None   Principal Problem:  Closed fracture of right hip [S72.001A]                 Active Insurance as of 10/8/2017     Primary Coverage     Payor Plan Insurance Group Employer/Plan Group    MEDICARE MEDICARE A & B      Payor Plan Address Payor Plan Phone Number Effective From Effective To    [address] [phone] 10/1/1996     [place] [address]       Subscriber Name Subscriber Birth Date Member ID       ALANNA DAVIS 1940 [id number]                 Emergency Contacts      (Rel.) Home Phone Work Phone Mobile Phone    Diana Kerr (Power of ) [phone] -- [phone]    Kimmy Kerr (Grandchild) -- -- [phone]              "

## 2017-10-17 NOTE — PROGRESS NOTES
Continued Stay Note  [facility]     Patient Name: Darrian Davis  MRN: [id number]  Today's Date: 10/17/2017    Admit Date: 10/8/2017          Discharge Plan       10/17/17 0914    Case Management/Social Work Plan    Plan SNF    Additional Comments Pt is much more cooperative per nsg. Referrals pending w/ several SARs to reevaluate. S/w pt's daughter, plan is for pt to go to [facility] prior to returning to [facility] dementia PC unit.     Final Note    Final Note                Discharge Codes     None            Shayna Castellano RN

## 2017-10-17 NOTE — PROGRESS NOTES
Continued Stay Note  [facility]     Patient Name: Darrian Davis  MRN: [id number]  Today's Date: 10/17/2017    Admit Date: 10/8/2017          Discharge Plan       10/17/17 1441    Case Management/Social Work Plan    Plan discharge to LTC facility     Patient/Family In Agreement With Plan yes    Additional Comments CCP spoke with daughter, Diana Kerr [phone]. daughter stated patient had lived with her for 6 years until August. In August, patient went to [facility] in [facility] [place]. Patient also went to the [place] and [facility]. daughter was looking for short term placement and for patient to go back to [facility]. Daughter stated she would rather him go to LTC upon discharge. Daughter states they can private pay if needed. Daughter has been paying patient's bills . Daughter believes patient has about $100,000 left for private pay. Daughter is going to [facility] this afternoon at 3 PM. Numerous referrals were made for discharge placements and received denials from several facilities. Denials marked in discharge placement section in epic. CCP made referral to [facility] and [facility] that are still pending. Will discuss with efren Casillas referrals if needed. CCP will follow up with both facilities for acceptance or denial. CCP will continue to follow for long term care placement of choice  and transportation. Lisbeth RAZO       10/17/17 1240    Case Management/Social Work Plan    Plan SNF               Discharge Codes     None            DUNG Ramirez

## 2017-10-17 NOTE — DISCHARGE PLACEMENT REQUEST
"Alanna Davis (77 y.o. Male)     Date of Birth Social Security Number Address Home Phone MRN    1940  [address] [address] [phone] [id number]    Caodaism Marital Status          Caodaism Single       Admission Date Admission Type Admitting Provider Attending Provider Department, Room/Bed    10/8/17 Emergency [place], MD Rolf Herman Stephen A, MD [address], P879/1    Discharge Date Discharge Disposition Discharge Destination                      Attending Provider: Fadi Bansal MD     Allergies:  Bee Venom, Iodine, Latex, Propoxyphene    Isolation:  None   Infection:  None   Code Status:  FULL    Ht:  71\" (180.3 cm)   Wt:  190 lb (86.2 kg)    Admission Cmt:  None   Principal Problem:  Closed fracture of right hip [S72.001A]                 Active Insurance as of 10/8/2017     Primary Coverage     Payor Plan Insurance Group Employer/Plan Group    MEDICARE MEDICARE A & B      Payor Plan Address Payor Plan Phone Number Effective From Effective To    [address] [phone] 10/1/1996     [place] [address]       Subscriber Name Subscriber Birth Date Member ID       ALANNA DAVIS 1940 [id number]                 Emergency Contacts      (Rel.) Home Phone Work Phone Mobile Phone    Diana Kerr (Power of ) [phone] -- [phone]    Kimmy Kerr (Grandchild) -- -- [phone]              "

## 2017-10-17 NOTE — DISCHARGE PLACEMENT REQUEST
"Alanna Davis (77 y.o. Male)     Date of Birth Social Security Number Address Home Phone MRN    1940  [address] [facility] [address] [phone] [id number]    Mandaen Marital Status          Buddhism Single       Admission Date Admission Type Admitting Provider Attending Provider Department, Room/Bed    10/8/17 Emergency [place], MD Rolf Herman Stephen A, MD [address], P879/1    Discharge Date Discharge Disposition Discharge Destination                      Attending Provider: Fadi Bansal MD     Allergies:  Bee Venom, Iodine, Latex, Propoxyphene    Isolation:  None   Infection:  None   Code Status:  FULL    Ht:  71\" (180.3 cm)   Wt:  190 lb (86.2 kg)    Admission Cmt:  None   Principal Problem:  Closed fracture of right hip [S72.001A]                 Active Insurance as of 10/8/2017     Primary Coverage     Payor Plan Insurance Group Employer/Plan Group    MEDICARE MEDICARE A & B      Payor Plan Address Payor Plan Phone Number Effective From Effective To    [address] [phone] 10/1/1996     [place] [address]       Subscriber Name Subscriber Birth Date Member ID       ALANNA DAVIS 1940 [id number]                 Emergency Contacts      (Rel.) Home Phone Work Phone Mobile Phone    Diana Kerr (Power of ) [phone] -- [phone]    Kimmy Kerr (Grandchild) -- -- [phone]              "

## 2017-10-17 NOTE — NURSING NOTE
Paged placed to Dr. Bansal regarding patient not taking PO Coumadin and subtherapeutic INR. Awaiting return call.

## 2017-10-17 NOTE — PLAN OF CARE
Problem: Patient Care Overview (Adult)  Goal: Plan of Care Review  Outcome: Ongoing (interventions implemented as appropriate)    10/17/17 7226   Coping/Psychosocial Response Interventions   Plan Of Care Reviewed With patient   Patient Care Overview   Progress improving   Outcome Evaluation   Outcome Summary/Follow up Plan VSS. patient refusing BP medication this shift- spit out when attempted to give. no s/s of HTN noted. patient confused at baseline. max assist x 2 to the chair. right hip incision has dry dressing. patient refusing dressing change. pills crushed with food. voiding per brief. BM this shift. wound care to right elbow abrasion done this shift. CPOT 0. discharge tomorrow if medically stable.          Problem: Fall Risk (Adult)  Goal: Absence of Falls  Outcome: Ongoing (interventions implemented as appropriate)    Problem: Skin Integrity Impairment, Risk/Actual (Adult)  Goal: Skin Integrity/Wound Healing  Outcome: Ongoing (interventions implemented as appropriate)    Problem: Hip Replacement, Total (Adult)  Goal: Signs and Symptoms of Listed Potential Problems Will be Absent or Manageable (Hip Replacement, Total)  Outcome: Ongoing (interventions implemented as appropriate)

## 2017-10-17 NOTE — DISCHARGE PLACEMENT REQUEST
"Alanna Davis (77 y.o. Male)     Date of Birth Social Security Number Address Home Phone MRN    1940  [address] [facility] [address] [phone] [id number]    Lutheran Marital Status          Taoist Single       Admission Date Admission Type Admitting Provider Attending Provider Department, Room/Bed    10/8/17 Emergency [place], MD Rolf Herman Stephen A, MD [address], P879/1    Discharge Date Discharge Disposition Discharge Destination                      Attending Provider: Fadi Bansal MD     Allergies:  Bee Venom, Iodine, Latex, Propoxyphene    Isolation:  None   Infection:  None   Code Status:  FULL    Ht:  71\" (180.3 cm)   Wt:  190 lb (86.2 kg)    Admission Cmt:  None   Principal Problem:  Closed fracture of right hip [S72.001A]                 Active Insurance as of 10/8/2017     Primary Coverage     Payor Plan Insurance Group Employer/Plan Group    MEDICARE MEDICARE A & B      Payor Plan Address Payor Plan Phone Number Effective From Effective To    [address] [phone] 10/1/1996     [place] [address]       Subscriber Name Subscriber Birth Date Member ID       ALANNA DAVIS 1940 [id number]                 Emergency Contacts      (Rel.) Home Phone Work Phone Mobile Phone    Diana Kerr (Power of ) [phone] -- [phone]    Kimmy Kerr (Grandchild) -- -- [phone]              "

## 2017-10-18 NOTE — DISCHARGE SUMMARY
[facility]    ASSOCIATES  [phone]    DISCHARGE SUMMARY  [facility]    Patient Identification:  Name: Darrian Davis  Age: 77 y.o.  Sex: male  :  1940  MRN: [id number]  Primary Care Physician: Narciso Ma MD    Admit date: 10/8/2017  Discharge date and time: No discharge date for patient encounter.     Discharge Diagnoses:Principal Problem:    Closed fracture of right hip  Active Problems:    CAD (coronary artery disease)    Hypertension    COPD (chronic obstructive pulmonary disease)    DM (diabetes mellitus), type 2    Dementia    Atrial fibrillation    History of DVT (deep vein thrombosis)    DNR (do not resuscitate)    Anticoagulated on Coumadin    Iron deficiency anemia    CKD (chronic kidney disease), stage III       History of present illness from H&P:    Admitted for right femoral neck fracture.  Severe underlying dementia.  He has been agitated at times during the hospitalization.  He came from assisted living and they were unable to take him to their facility.  Psychiatry saw the patient and adjusted his medications.  He has been voiding but was incontinent.  Some mild urinary retention but PVRs over the weekend were less than 300. Chronic A. fib.  And the patient is on Coumadin and got 7.5 mg last 3 days.  His INR today is 2.27 but he refused Coumadin last night so it may drop again.   Monitor INRs at the rehabilitation facility.      Chronic kidney disease stage III, stable, last check here is 1.1    Diabetes mellitus type 2 he has not needed any medications for this.  Blood sugars here in the hospital within reasonably good.  Spot checks at nursing home recommended.      Thyroid studies are consistent with sick euthyroid.  Recheck labs in 2-4 weeks.    For the agitation the patient is currently getting Depakote, but has refused that the last 24 hours.    Review of the patient prescription for Lortabs which she's been using sparingly  here.    Patient has coronary artery disease and was seen by cardiology here in the hospital, they recommended continuing current regimen of aspirin.        Consults:     Consults     Date and Time Order Name Status Description    10/13/2017 1606 Inpatient Consult to Psychiatrist Completed     10/8/2017 1012 Inpatient Consult to Cardiology Completed     10/8/2017 0949 Inpatient Consult to Orthopedic Surgery Completed     10/8/2017 0651 Ortho (on-call MD unless specified) Completed     10/8/2017 0651 [facility] (on-call MD unless specified) Completed             Results from last 7 days  Lab Units 10/18/17  0327   WBC 10*3/mm3 6.22   HEMOGLOBIN g/dL 8.9*   HEMATOCRIT % 31.5*   PLATELETS 10*3/mm3 218         Results from last 7 days  Lab Units 10/18/17  0327   SODIUM mmol/L 144   POTASSIUM mmol/L 4.2   CHLORIDE mmol/L 112*   CO2 mmol/L 20.5*   BUN mg/dL 26*   CREATININE mg/dL 1.10   GLUCOSE mg/dL 112*   CALCIUM mg/dL 9.9       Significant Diagnostic Studies:   Lab Results   Component Value Date    WBC 6.22 10/18/2017    HGB 8.9 (L) 10/18/2017    HCT 31.5 (L) 10/18/2017     10/18/2017     Lab Results   Component Value Date     10/18/2017    K 4.2 10/18/2017     (H) 10/18/2017    CO2 20.5 (L) 10/18/2017    BUN 26 (H) 10/18/2017    CREATININE 1.10 10/18/2017    GLUCOSE 112 (H) 10/18/2017     Lab Results   Component Value Date    CALCIUM 9.9 10/18/2017     Lab Results   Component Value Date    AST 15 10/18/2017    ALT 8 10/18/2017    ALKPHOS 65 10/18/2017     Lab Results   Component Value Date    INR 2.27 (H) 10/18/2017     No results found for: COLORU, CLARITYU, SPECGRAV, PHUR, PROTEINUR, GLUCOSEU, KETONESU, BLOODU, NITRITE, LEUKOCYTESUR, BILIRUBINUR, UROBILINOGEN, RBCUA, WBCUA, BACTERIA  No results found for: TROPONINT, TROPONINI, BNP  No components found for: HGBA1C;2  No components found for: TSH;2    Imaging Results (all)     Procedure Component Value Units Date/Time    XR Hand 3+ View Right [[id number]]  Collected:  10/08/17 1418     Updated:  10/08/17 1722    Narrative:       RIGHT HAND RADIOGRAPHS     HISTORY: 77-year-old male with a history of right hand pain status post  fall.     FINDINGS: PA and lateral radiographs of the right hand were obtained and  demonstrate diffuse osteopenia. There is evidence of plate and screw  fixation of the radius with the carpal bones and 3rd metacarpal bone.  Alignment appears appropriate. Joint space narrowing of the DIP joint of  the 2nd digit and MCP joint of the 1st digit is noted.       Impression:       There is no evidence for an acute abnormality of the right  hand. Plate and screw fixation of the distal forearm, wrist and 2nd  metacarpal is noted.     This report was finalized on 10/8/2017 5:18 PM by Dr. Rhett Hdz MD.       CT Head Without Contrast [[id number]] Collected:  10/08/17 0736     Updated:  10/09/17 1607    Narrative:       HISTORY: Fell, possible head injury. Confusion. Neck pain.      CT OF THE BRAIN WITHOUT CONTRAST 10/08/2017      TECHNIQUE: Axial images were obtained through the brain without  intravenous contrast.      COMPARISON: Previous study dated 08/12/2017 is compared.     FINDINGS:  There is moderate diffuse atrophy. There is evidence of  previous left frontal craniotomy. There is decreased attenuation of the  periventricular white matter bilaterally consistent with small vessel  white matter ischemic disease.     There is no evidence of acute infarction, hemorrhage, midline shift or  mass effect.     No skull fractures are seen.       Impression:       1. Evidence of previous left frontal craniotomy, atrophy and small  vessel white matter ischemic disease.  2. No acute intracranial process identified.        CT OF THE CERVICAL SPINE WITHOUT CONTRAST 10/08/2017     TECHNIQUE: Axial images were obtained from the skull base to the upper  thoracic spine. Sagittal and coronal reconstruction images were  reviewed.     There is degenerative change  of the C1-C2 articulation.     There is C3-4, C4-5, C5-6, C6-7 and C7-T1 spondylosis.     Bilateral carotid artery calcification.     IMPRESSION:  1. Cervical degenerative disease as described.  2. No cervical spine fractures are seen. No significant subluxation is  seen.     Radiation dose reduction techniques were utilized, including automated  exposure control and exposure modulation based on body size.     This report was finalized on 10/9/2017 4:04 PM by Dr. Juvencio Stark MD.       CT Cervical Spine Without Contrast [[id number]] Collected:  10/08/17 0736     Updated:  10/09/17 1607    Narrative:       HISTORY: Fell, possible head injury. Confusion. Neck pain.      CT OF THE BRAIN WITHOUT CONTRAST 10/08/2017      TECHNIQUE: Axial images were obtained through the brain without  intravenous contrast.      COMPARISON: Previous study dated 08/12/2017 is compared.     FINDINGS:  There is moderate diffuse atrophy. There is evidence of  previous left frontal craniotomy. There is decreased attenuation of the  periventricular white matter bilaterally consistent with small vessel  white matter ischemic disease.     There is no evidence of acute infarction, hemorrhage, midline shift or  mass effect.     No skull fractures are seen.       Impression:       1. Evidence of previous left frontal craniotomy, atrophy and small  vessel white matter ischemic disease.  2. No acute intracranial process identified.        CT OF THE CERVICAL SPINE WITHOUT CONTRAST 10/08/2017     TECHNIQUE: Axial images were obtained from the skull base to the upper  thoracic spine. Sagittal and coronal reconstruction images were  reviewed.     There is degenerative change of the C1-C2 articulation.     There is C3-4, C4-5, C5-6, C6-7 and C7-T1 spondylosis.     Bilateral carotid artery calcification.     IMPRESSION:  1. Cervical degenerative disease as described.  2. No cervical spine fractures are seen. No significant subluxation is  seen.      Radiation dose reduction techniques were utilized, including automated  exposure control and exposure modulation based on body size.     This report was finalized on 10/9/2017 4:04 PM by Dr. Juvencio Stark MD.       XR Chest 1 View [[id number]] Collected:  10/08/17 0738     Updated:  10/09/17 1608    Narrative:       HISTORY: Fell, right hip pain.     AP PELVIS     FINDINGS:  2 views of the pelvis show a minimally displaced fracture of  the right femoral neck. No other fractures of the pelvis are seen.  Proximal left femur appears intact.       Impression:       Right femoral neck fracture.     AP CHEST     FINDINGS:  Heart size is mildly enlarged. Lungs are slightly  underinflated with some vascular crowding. There is some bilateral  vascular congestion. There may be some minimal interstitial edema as the  interstitial markings appear somewhat prominent. There also may be a  tiny amount of fluid in the minor fissure on the right. There is some  calcification of the mitral valve annulus.     IMPRESSION:   1. Mild vascular congestion and suggestive mild interstitial edema  somewhat more prominent than on 08/12/2017. This finding is somewhat  exaggerated by suboptimal inspiration.  2. Mild cardiomegaly.     This report was finalized on 10/9/2017 4:05 PM by Dr. Juvencio Stark MD.       XR Pelvis 1 or 2 View [[id number]] Collected:  10/08/17 0738     Updated:  10/09/17 1608    Narrative:       HISTORY: Fell, right hip pain.     AP PELVIS     FINDINGS:  2 views of the pelvis show a minimally displaced fracture of  the right femoral neck. No other fractures of the pelvis are seen.  Proximal left femur appears intact.       Impression:       Right femoral neck fracture.     AP CHEST     FINDINGS:  Heart size is mildly enlarged. Lungs are slightly  underinflated with some vascular crowding. There is some bilateral  vascular congestion. There may be some minimal interstitial edema as the  interstitial markings appear  somewhat prominent. There also may be a  tiny amount of fluid in the minor fissure on the right. There is some  calcification of the mitral valve annulus.     IMPRESSION:   1. Mild vascular congestion and suggestive mild interstitial edema  somewhat more prominent than on 08/12/2017. This finding is somewhat  exaggerated by suboptimal inspiration.  2. Mild cardiomegaly.     This report was finalized on 10/9/2017 4:05 PM by Dr. Juvencio Stark MD.       XR Hip With or Without Pelvis 1 View Right [[id number]] Collected:  10/10/17 1805     Updated:  10/10/17 1809    Narrative:       RIGHT HIP WITH PELVIS 2 VIEWS     HISTORY: 77-year-old male postop right partial hip replacement     COMPARISON: 10/08/2017     FINDINGS:  1. Satisfactory appearance following placement of prosthesis, no other  change in appearance of bony pelvis.  2. Imaging is limited by osteopenia and considerable overlying bowel  content.     This report was finalized on 10/10/2017 6:06 PM by Dr. Horacio Ang MD.             TEST  RESULTS PENDING AT DISCHARGE      Patient Instructions:    Darrian Davis   Home Medication Instructions ANANDA:[id number]    Printed on:10/18/17 1411   Medication Information                      aspirin 81 MG tablet  Take 81 mg by mouth Daily.             Divalproex Sodium (DEPAKOTE SPRINKLE) 125 MG capsule  Take 250 mg by mouth 3 (Three) Times a Day.             ferrous sulfate (IRON SUPPLEMENT) 325 (65 FE) MG tablet  Take 1 tablet by mouth 2 (Two) Times a Day.             gabapentin (NEURONTIN) 300 MG capsule  Take 1 capsule by mouth 3 (Three) Times a Day.             metoprolol tartrate (LOPRESSOR) 25 MG tablet  Take 1 tablet by mouth 2 (Two) Times a Day.             Multiple Vitamins-Minerals (MULTIVITAMIN ADULT PO)  Take  by mouth Daily.             nitroglycerin (NITROSTAT) 0.4 MG SL tablet  Place 0.4 mg under the tongue Every 5 (Five) Minutes As Needed for Chest Pain. Take no more than 3 doses in 15 minutes.              Omega-3 Fatty Acids (FISH OIL) 1000 MG capsule capsule  Take 1,000 mg by mouth Daily With Breakfast.             traZODone (DESYREL) 100 MG tablet  Take 100 mg by mouth Every Night.             warfarin (COUMADIN) 5 MG tablet  Take 5 mg by mouth Daily.                   No future appointments.  Follow-up Information     [facility] with [facility]    Specialty:  Assisted Living Facility    Contact information:    [address]  [facility] [address]  [phone]        Follow up with Greg Dickerson MD Follow up on 10/30/2017.    Specialty:  Orthopedic Surgery    Contact information:    [address]  [facility] [address]  [phone]          Discharge Order     None      Check Tsh and Free T4, 4 weeks    Check inr daily until stable, inr 2-3    Wound care per ortho    Activity WBAT      Total time spent discharging patient including evaluation, post hospitalization follow up,  medication and post hospitalization instructions and education, total time exceeds 30 minutes.    Signed:  Fadi Bansal MD  10/18/2017  1:54 PM

## 2017-10-18 NOTE — SIGNIFICANT NOTE
10/18/17 0958   PT Discharge Summary   Reason for Discharge Discharge from facility   Discharge Destination SNF  (plan for dc to [facility] today.  will f/u in am if dc delayed)

## 2017-10-18 NOTE — PLAN OF CARE
Problem: Patient Care Overview (Adult)  Goal: Plan of Care Review  Outcome: Ongoing (interventions implemented as appropriate)    10/18/17 0522   Coping/Psychosocial Response Interventions   Plan Of Care Reviewed With patient   Patient Care Overview   Progress improving   Outcome Evaluation   Outcome Summary/Follow up Plan Pt has been stable through the night. Easily agitated, refusing treatment at times. Pain meds offered and refused several times. Up to chair and bed a few times. Voiding per brief, BMx2. Education provided on HTN management with BP monitoring.        Goal: Adult Individualization and Mutuality  Outcome: Ongoing (interventions implemented as appropriate)  Goal: Discharge Needs Assessment  Outcome: Ongoing (interventions implemented as appropriate)    Problem: Fractured Hip (Adult)  Goal: Signs and Symptoms of Listed Potential Problems Will be Absent or Manageable (Fractured Hip)  Outcome: Ongoing (interventions implemented as appropriate)    10/18/17 0522   Fractured Hip   Problems Assessed (Fractured Hip) all   Problems Present (Fractured Hip) functional deficit/self-care deficit         Problem: Fall Risk (Adult)  Goal: Absence of Falls  Outcome: Ongoing (interventions implemented as appropriate)    10/18/17 0522   Fall Risk (Adult)   Absence of Falls achieves outcome         Problem: Skin Integrity Impairment, Risk/Actual (Adult)  Goal: Skin Integrity/Wound Healing  Outcome: Ongoing (interventions implemented as appropriate)    10/18/17 0522   Skin Integrity Impairment, Risk/Actual (Adult)   Skin Integrity/Wound Healing achieves outcome

## 2017-10-28 PROBLEM — K92.2 ACUTE GI BLEEDING: Status: ACTIVE | Noted: 2017-01-01

## 2017-10-29 PROBLEM — E87.6 HYPOKALEMIA: Status: ACTIVE | Noted: 2017-01-01

## 2017-10-29 PROBLEM — M25.511 RIGHT SHOULDER PAIN: Status: ACTIVE | Noted: 2017-01-01

## 2017-10-29 PROBLEM — D62 ACUTE BLOOD LOSS ANEMIA: Status: ACTIVE | Noted: 2017-01-01

## 2017-10-29 PROBLEM — Z96.641 S/P HIP REPLACEMENT, RIGHT: Status: ACTIVE | Noted: 2017-01-01

## 2017-11-01 NOTE — PROGRESS NOTES
[facility]  Cardiology Progress Note    Patient Identification:  Name: Darrian Davis  Age: 77 y.o.  Sex: male  :  1940  MRN: [id number]                 Follow Up / Chief Complaint: ventricular tachycardia,h/o NSVT,  permanent atrial fib, CAD, MI, HTN, PHTN    Interval History:   77-year-old male with history of CAD, permanent atrial fibrillation, hypertension and dementia with reports of atypical right-sided chest pain-hospitalized for GI bleed and severe anemia.  Had episode of sustained ventricular tachycardia last yesterday     Subjective: Unable to obtain due to patient disorientation    Objective:    No further VT, remains afib with cvr   -150's / 75 -90's. Isolated reading 152/112 this am      Coumadin on hold: INR 2.8 today   Hgb 7.8 / Hct 27.3        Past Medical History:  Past Medical History:   Diagnosis Date   • Anxiety    • Arthritis    • Atrial fibrillation    • CAD (coronary artery disease)    • Confusion    • COPD (chronic obstructive pulmonary disease)    • Dementia    • Depression    • DM (diabetes mellitus), type 2    • DVT (deep venous thrombosis)    • History of transfusion    • Hypertension    • Kidney stone    • Myocardial infarction    • PE (pulmonary thromboembolism)    • Prostate cancer    • Skin cancer    • Stroke      Past Surgical History:  Past Surgical History:   Procedure Laterality Date   • BACK SURGERY     • COLONOSCOPY     • HIP SPACER INSERTION WITH ANTIBIOTIC CEMENT Right 10/10/2017    Procedure: HIP BIPOLAR CEMENTED;  Surgeon: Greg Dickerson MD;  Location: [facility];  Service:    • HIP SURGERY     • PROSTATE SURGERY          Social History:   Social History   Substance Use Topics   • Smoking status: Former Smoker     Packs/day: 2.00     Years: 40.00     Types: Cigarettes   • Smokeless tobacco: Never Used   • Alcohol use No      Family History:  Family History   Problem Relation Age of Onset   • Family history unknown: Yes           Allergies:  Allergies   Allergen Reactions   • Bee Venom    • Iodine Hives   • Latex    • Propoxyphene Hives     Scheduled Meds:    amiodarone 200 mg Q12H   [START ON 11/3/2017] amiodarone 200 mg Q24H   insulin aspart 0-7 Units 4x Daily With Meals & Nightly   LORazepam 0.25 mg Q12H   metoprolol succinate XL 12.5 mg Q24H   pantoprazole 40 mg BID AC           INTAKE AND OUTPUT:    Intake/Output Summary (Last 24 hours) at 11/01/17 1133  Last data filed at 11/01/17 0600   Gross per 24 hour   Intake           858.75 ml   Output                0 ml   Net           858.75 ml       Review of Systems: unable to obtain from pt due to disorientation  GI:  Cardiac:  Pulmonary:    Constitutional:  Temp:  [97.5 °F (36.4 °C)-98 °F (36.7 °C)] 97.9 °F (36.6 °C)  Heart Rate:  [77-88] 79  Resp:  [16-20] 20  BP: (127-157)/() 152/112    Physical Exam  General:  Appears chronically ill but, in no acute distress  Eyes: PERTL,  HEENT:  No JVD.Oral mucosa moist, no mucosal cyanosis  Respiratory: Respirations regular and unlabored at rest. BBS with air entry in all fields, slightly diminished in bases but poor deep inspiratory effort. No crackles or wheezes auscultated  Cardiovascular: S1S2 irregular rate and rhythm. No murmur  No pretibial pitting edema  Gastrointestinal: Abdomen soft, nondistended, non tender. Bowel sounds present.  Musculoskeletal: MOORE weakly x4. No abnormal movements  Extremities: No digital clubbing or cyanosis  Skin: Color pale, pink. Skin warm and very dry to touch.  Neuro: Awake. Oriented to person    Psych: Confused      Cardiographics  Telemetry: afib        Echocardiogram 8-2017:   · Left ventricular systolic function is normal. Calculated EF = 59.6%. Estimated EF was in agreement with the calculated EF. Normal left ventricular cavity size noted. All left ventricular wall segments contract normally. Left ventricular wall thickness is consistent with mild-to-moderate concentric hypertrophy. Left  ventricular diastolic was unable to be assessed. Elevated left atrial pressure.        Lab Review     Results from last 7 days  Lab Units 10/31/17  0643 10/28/17  1423   TROPONIN T ng/mL 0.016 0.026       Results from last 7 days  Lab Units 11/01/17  0657   SODIUM mmol/L 143   POTASSIUM mmol/L 4.1   BUN mg/dL 15   CREATININE mg/dL 1.24   CALCIUM mg/dL 9.2       Results from last 7 days  Lab Units 11/01/17  0657 10/31/17  2208 10/31/17  0643   WBC 10*3/mm3 4.82 5.40 4.43*   HEMOGLOBIN g/dL 7.8* 7.6* 7.7*   HEMATOCRIT % 27.3* 26.4* 26.7*   PLATELETS 10*3/mm3 178 201 180       Results from last 7 days  Lab Units 11/01/17  0657 10/30/17  0806 10/29/17  1533   INR  2.80* 2.63* 2.49*         Assessment:  - sustained ventricular tachycardia  - h/o NSVT  - (Permanent ) atrial fibrillation - coumadin  - atypical chest pain  - CAD - h/o MI @ age 28  - HTN  - CKD  - abdominal pain  - s/p GIB  - possible colon Ca - possibly metastatic  - anemia  - Dementia      Plan:  - sustained ventricular tachycardia -   No recurrence.  Loading with titrating doses of oral Amiodarone,  MI ruled out. May still have underlying ischemia as etiology of NSVT.  Unable to proceed with stress testing due to hemoglobin <9.0   Electrolytes within normal limits.  Recent TSH abnormal.  H/o NSVT. Ischemic workup pending correction of anemia    - (Permanent ) atrial fibrillation - rate controlled on beta blocker. INR 2.8 today.  Anticoagulation currently on hold  pending GI work up and treatment of anemia.  If patient has metastatic cancer, would not be a candidate for continued anticoagulation    - atypical chest pain - no recurrence.  Atypical for cardiac - was right-sided in nature. MI ruled out, however may have underlying ischemia as etiology of NSVT.  Unable to proceed with stress testing due to hemoglobin <9.0    Known CAD - h/o MI @ age 28    - HTN - labile.  Beta blocker increased.   PRN IV hydralazine      MI ruled out.  Unable to proceed with  "stress testing due to  Hgb <9.0.  Ventricular rate is controlled and no further  VT on beta blocker. Previously on Coumadin for permanent afib and elevated [id number]. Anticoagulation currently on hold pending GI work up and treatment of anemia.  If patient has metastatic cancer, would not be a candidate for continued anticoagulation.        Labs/tests ordered for am: INR    I reviewed the patient's new clinical results and treatment plan   I personally viewed and interpreted the patient's EKG/Telemetry data    )11/1/2017  Zacarias Najera MD      EMR Dragon/Transcription:   \"Dictated utilizing Dragon dictation\".     "

## 2017-11-01 NOTE — SIGNIFICANT NOTE
11/01/17 1323   Rehab Treatment   Discipline physical therapist   Treatment Not Performed patient unavailable for treatment  (Palliative care consult today, pt not following commands to participate in PT at this time.  Plan to f/u 11/2 as appropriate  )   Recommendation   PT - Next Appointment 11/02/17

## 2017-11-01 NOTE — PROGRESS NOTES
[facility]/GI Progress Note   Chief Complaint:  Melena and anemia while on Coumadin, abnormal CT abd (concerning for colon Ca)    Subjective     Interval History: Pt moans, will follow simple commands but unable to get hx.  Appears uncomfortable with abd exam but has same reaction when moving his extremities, unable to verbalized specific co's.  Per RN still having black stools, Hgb remains mid 7 range.  Cards planning stress test today.  Per RN has asked for Palliative RN consult today    History taken from: chart RN    Review of Systems:    All systems were reviewed and negative except for:  Gastrointestinal: postitive for  melena and pain  Hematologic / Lymphatic: positive for  anemia  Neurological: positive for  confusion and hx of dementia    Objective     Vital Signs  Temp:  [97.5 °F (36.4 °C)-98 °F (36.7 °C)] 97.9 °F (36.6 °C)  Heart Rate:  [77-88] 79  Resp:  [16-20] 20  BP: (127-157)/() 152/112  Body mass index is 24.69 kg/(m^2).    Intake/Output Summary (Last 24 hours) at 11/01/17 0832  Last data filed at 11/01/17 0600   Gross per 24 hour   Intake           858.75 ml   Output                0 ml   Net           858.75 ml          Physical Exam:   General: patient awake, lying in bed moaning   Eyes: Normal lids and lashes, no scleral icterus, conjunctival pallor   Neck: no tracheal deviation, no thyromegaly   Skin: warm and dry, not jaundiced, multiple areas of excoriation/scabs/lesions to bilateral UE's, skin tear to LUE   Cardiovascular: regular rhythm and rate, no murmurs auscultated   Pulm: decreased to auscultation bilaterally, regular and unlabored   Abdomen: soft, diffusely tender to exam, nondistended; normal bowel sounds   Rectal: deferred   Extremities: no rash or edema   Neurologic: unable to answer questions appropriately, lying in bed moaning, when he talks conversation is not appropriate to circumstances    All Medications Have Been Reviewed     Results Review:       Results from last 7  days  Lab Units 11/01/17  0657 10/31/17  2208 10/31/17  0643   WBC 10*3/mm3 4.82 5.40 4.43*   HEMOGLOBIN g/dL 7.8* 7.6* 7.7*   HEMATOCRIT % 27.3* 26.4* 26.7*   PLATELETS 10*3/mm3 178 201 180         Results from last 7 days  Lab Units 11/01/17  0657 10/31/17  0643 10/30/17  0806  10/28/17  1423   SODIUM mmol/L 143 144 147*  < > 143   POTASSIUM mmol/L 4.1 4.0 4.1  < > 3.8   CHLORIDE mmol/L 114* 115* 117*  < > 110*   CO2 mmol/L 18.5* 17.8* 19.7*  < > 21.3*   BUN mg/dL 15 16 20  < > 33*   CREATININE mg/dL 1.24 1.09 1.31*  < > 1.50*   CALCIUM mg/dL 9.2 8.5* 9.3  < > 9.3   BILIRUBIN mg/dL  --   --  0.3  --  0.2   ALK PHOS U/L  --   --  87  --  83   ALT (SGPT) U/L  --   --  7  --  9   AST (SGOT) U/L  --   --  9  --  14   GLUCOSE mg/dL 86 96 89  < > 104*   < > = values in this interval not displayed.      Results from last 7 days  Lab Units 11/01/17  0657 10/30/17  0806 10/29/17  1533   INR  2.80* 2.63* 2.49*       RADIOLOGY:    Imaging Results (last 72 hours)     Procedure Component Value Units Date/Time    XR Shoulder 2+ View Right [[id number]] Collected:  10/29/17 1449     Updated:  10/29/17 1453    Narrative:       RIGHT SHOULDER 2 VIEWS     HISTORY: 77-year-old male with right shoulder pain, COPD, diabetes,  history of DVT and hypertension     COMPARISON: 05/28/2014     FINDINGS:  1. Arthritic changes of the right shoulder with evidence of complete  rotator cuff tear.  2. No acute traumatic osseous abnormality.     This report was finalized on 10/29/2017 2:50 PM by Dr. Horacio Ang MD.       CT Abdomen Pelvis Stone Protocol [[id number]] Collected:  10/30/17 2027     Updated:  10/30/17 2048    Narrative:       CT ABDOMEN PELVIS STONE PROTOCOL-     CLINICAL HISTORY: Abdominal pain and distention     TECHNIQUE: Spiral CT images were acquired through the abdomen and pelvis  with oral contrast only and were reconstructed in 3 mm thick axial  slices.     Radiation dose reduction techniques were utilized, including  automated  exposure control and exposure modulation based on body size.     COMPARISON: CT scan of the abdomen and pelvis dated 08/12/2017.     FINDINGS: The images are somewhat degraded due to beam hardening  artifact from the patient's arms that apparently could not be raised.  Images through the lung bases demonstrate small bilateral posteriorly  layering pleural effusions that are new. The effusion on the right is  slightly greater than on the left. There is a 1.5 cm diameter smoothly  marginated noncalcified pulmonary nodule in the inferior aspect of the  right lower lobe that has increased in size significantly since the  preceding CT scan dated 08/12/2017 where it measured only 6 mm in  greatest transverse diameter. Further evaluation with a follow-up CT  scan of the chest is suggested. The liver and spleen and pancreas are  unremarkable. A small approximately 1.8 cm diameter left adrenal mass is  unchanged, and statistically is most likely an adenoma. The right  adrenal gland is unremarkable. A couple small gallstones are noted in  the lumen of the gallbladder. There is no bile duct dilatation. The  kidneys are atrophic and contain multiple bilateral renal masses most of  which are compatible with simple cysts. However, there is an  indeterminate lesion in the upper pole of the right kidney measuring up  to 1 cm in diameter that could be solid. This is seen on multiple  previous CT scans dating back to 02/21/2013 where it measured  approximately 3.3 cm in diameter. This indicates the lesion is either  benign or a very indolent renal cell carcinoma. There is diffuse  stranding of the intra-abdominal and pelvic fat new and is likely edema  due to mild anasarca. Small amount of ascites is evident along the dome  of the urinary bladder that is new. There is moderate localized  circumferential wall thickening within the right side of the colon  immediately superior to the cecum that is highly suspicious for  colon  carcinoma. This produces moderately severe narrowing of the lumen of the  colon. There is no definite evidence of obstruction at this time. The  small bowel is not dilated. There are several mildly enlarged lymph  nodes medial to the colon neoplasm consistent with localized metastatic  disease. Multiple mild to moderately enlarged lymph nodes are also  present in the retroperitoneum consistent with metastatic disease. The  lymphadenopathy is new since the previous CT dated 08/12/2017. The  stomach is unremarkable.       Impression:       No definite acute process is identified within the abdomen  and pelvis. There is moderate circumferential wall thickening involving  the ascending colon producing moderate luminal narrowing. The findings  are highly suspicious for colon carcinoma. There are several mildly  enlarged lymph nodes medial to the neoplasm consistent with localized  metastatic disease. Multiple mild to moderately enlarged lymph nodes are  also present in the retroperitoneum consistent with metastatic disease.  The lymphadenopathy is new since the previous CT dated 08/12/2017. There  is no evidence of visceral metastatic disease in the abdomen. However,  there is a nodule at the right lung base that shows significant increase  in size since the preceding CT scan that could be a lung metastasis.  There are multiple bilateral renal masses that are primarily consistent  with simple cysts. However there is a possible solid mass in the upper  pole right kidney measuring 4.1 cm in diameter that show slight increase  in size when compared to multiple previous CTs dating back to  03/21/2013. This could be solid. Small stable left adrenal mass.  Cholelithiasis. Small bilateral pleural effusions, greater in size on  the right than the left.     This report was finalized on 10/30/2017 8:45 PM by Dr. Wilbert Tam MD.             Assessment/Plan     Patient Active Problem List   Diagnosis Code   • Severe  sepsis A41.9, R65.20   • Acute on chronic renal insufficiency N28.9, N18.9   • Hypernatremia E87.0   • Dehydration E86.0   • Atrial fibrillation I48.91   • Nonsustained ventricular tachycardia I47.2   • Dementia F03.90   • Closed fracture of right hip S72.001A   • CAD (coronary artery disease) I25.10   • Hypertension I10   • COPD (chronic obstructive pulmonary disease) J44.9   • DM (diabetes mellitus), type 2 E11.9   • Dementia F03.90   • Atrial fibrillation I48.91   • History of DVT (deep vein thrombosis) Z86.718   • DNR (do not resuscitate) Z66   • Anticoagulated on Coumadin Z51.81, Z79.01   • Iron deficiency anemia D50.9   • CKD (chronic kidney disease), stage III N18.3   • Acute GI bleeding K92.2   • Hypokalemia E87.6   • S/P hip replacement, right Z96.641   • Right shoulder pain M25.511   • Acute blood loss anemia D62     Liza Mahajan, APRN  11/01/17  8:32 AM    Patient seen and examined, cardiac workup in progress.  Scan suggest thickening of the ascending colon with suspicion for colon cancer.  Awaiting lab results.  Family has yet to decide about endoscopic assessment, may be an issue of palliative treatment.        Assessment:  1) Melena- in the setting of coumadin.  Continues with dark stools  2) JANE- Hgb 6 on admission, improved s/p 2 units PRBC's but remains low in mid 7 range.  Previously refused endoscopy for work up in 2015 when seen by Dr. Pate (did not have dementia at that time)  3) Afib/CAD- maintained on Coumadin as out pt, currently on hold, INR 2.8 this am  4) Abdominal pain- CT abd with po contrast 10/30 with no acute process in the abd and pelvis.  Moderate circumferential wall thickening of ascending colon producing moderate luminal narrowing, suspicious for colon cancer. Enlarged lymph nodes in retroperitoneum c/w metastatic disease.  No evidence of visceral metastatic dx in the abdomen.  CEA pending  5) V tach- runs of v tach occurred 10/30, cards w/u in progress, for stress test  today    - continue to hold AC  - monitor H and H, will defer transfusion to primary/cards   - continue BID PPI po  - discussed with pt's POA and daughter, Diana Kerr, at [phone] on 10/30 but she was unable to make a decision regarding proceeding with endoscopy procedure.  Aware it would not be done without her consent and only when labs are acceptable and cleared by cardiology.  If met disease if present may consider Palliative care measures  - recommend oncology consult based upon CT results  - f.u CEA results

## 2017-11-01 NOTE — PLAN OF CARE
Problem: Patient Care Overview (Adult)  Goal: Plan of Care Review  Outcome: Ongoing (interventions implemented as appropriate)    11/01/17 6599   Coping/Psychosocial Response Interventions   Plan Of Care Reviewed With patient   Patient Care Overview   Progress no change   Outcome Evaluation   Outcome Summary/Follow up Plan Pt very anxious, sad, and agitated through out the day. Palliative care consulted. Hgb remains low. Stress test not able to be completed due to low hgb. Care discussed with daughter (POA). One episode of melena. VSS. A. fib with rate control on monitor. No acute signs of distress. Will continue to monitor.       Goal: Adult Individualization and Mutuality  Outcome: Ongoing (interventions implemented as appropriate)  Goal: Discharge Needs Assessment  Outcome: Ongoing (interventions implemented as appropriate)    Problem: Gastrointestinal Bleeding (Adult)  Goal: Signs and Symptoms of Listed Potential Problems Will be Absent or Manageable (Gastrointestinal Bleeding)  Outcome: Ongoing (interventions implemented as appropriate)    Problem: Fall Risk (Adult)  Goal: Absence of Falls  Outcome: Ongoing (interventions implemented as appropriate)    Problem: Pressure Ulcer Risk (Dagoberto Scale) (Adult,Obstetrics,Pediatric)  Goal: Skin Integrity  Outcome: Ongoing (interventions implemented as appropriate)    Problem: Pain, Acute (Adult)  Goal: Acceptable Pain Control/Comfort Level  Outcome: Ongoing (interventions implemented as appropriate)

## 2017-11-01 NOTE — PLAN OF CARE
Problem: Patient Care Overview (Adult)  Goal: Plan of Care Review  Outcome: Ongoing (interventions implemented as appropriate)    11/01/17 0350   Coping/Psychosocial Response Interventions   Plan Of Care Reviewed With patient   Patient Care Overview   Progress no change   Outcome Evaluation   Outcome Summary/Follow up Plan Patient very agitated when touched/moved for lab sticks and turning. Pulled out 2IVs, new one restarted with some difficulty from patient. Ativan given scheduled and once PRN for restlessness and agitation. Vitals stable. Hgb remains about the same but patient continues to have dark stools. Will continue to monitor and assess.        Goal: Adult Individualization and Mutuality  Outcome: Ongoing (interventions implemented as appropriate)  Goal: Discharge Needs Assessment  Outcome: Ongoing (interventions implemented as appropriate)    Problem: Gastrointestinal Bleeding (Adult)  Goal: Signs and Symptoms of Listed Potential Problems Will be Absent or Manageable (Gastrointestinal Bleeding)  Outcome: Ongoing (interventions implemented as appropriate)    Problem: Fall Risk (Adult)  Goal: Absence of Falls  Outcome: Ongoing (interventions implemented as appropriate)    Problem: Pressure Ulcer Risk (Dagoberto Scale) (Adult,Obstetrics,Pediatric)  Goal: Skin Integrity  Outcome: Ongoing (interventions implemented as appropriate)    Problem: Pain, Acute (Adult)  Goal: Acceptable Pain Control/Comfort Level  Outcome: Ongoing (interventions implemented as appropriate)

## 2017-11-01 NOTE — CONSULTS
Purpose of the visit was to evaluate for: goals of care/advanced care planning. Spoke with RN as well as Diana PEREIRA, and discussed palliative care, goals of care, care options, resuscitation status, [facility] scattered bed status and discharge options.      Assessment: Patient is palliative care appropriate for inpatient care given GI bleeding, anemia, has been on Coumadin, possible metastatic colon cancer, severe dementia, Afib, DM, runs of Vtach, agitation, needing Ativan for management, having dark stools, Hgb 7.8 today, nursing staff reports moaning, medicated for pain and agitation. Daughter states he has really gone down hill the last years and has been in the hospital more that he has been out.      Recommendations/Plan: transfer to [facility] for palliative care. Followup referral from [facility] recommended to discuss discharge plans. Long phone discussion with daughter, Diana about Palliative Care. She knows that he refused having a scope and any treatment that might be offered in 2015.  She wants him to be comfortable and not in pain. Discussed Code Status, she states that he has a Living Will on the chart and he does not want to resuscitated and kept alive if he is suffering.  She feels like he is suffering now.     Other Comments: Daughter would like to talk to Dr. Dejesus before making any final decision about Palliative Care. Report to nurse, Laura on 6N, she will call Dr. Dejesus or Jazzmine MEYER covering the case.    Thank you for the referral.    Total time: 45 minutes.

## 2017-11-01 NOTE — PROGRESS NOTES
Discharge Planning Assessment  [facility]     Patient Name: Darrian Davis  MRN: [id number]  Today's Date: 11/1/2017    Admit Date: 10/28/2017          Discharge Needs Assessment       11/01/17 1349    Living Environment    Lives With facility resident    Living Arrangements residential facility    Home Accessibility no concerns    Stair Railings at Home none    Type of Financial/Environmental Concern none    Transportation Available ambulance    Living Environment    Provides Primary Care For no one, unable/limited ability to care for self    Primary Care Provided By other (see comments)   NH staff    Quality Of Family Relationships supportive    Able to Return to Prior Living Arrangements yes    Discharge Needs Assessment    Concerns To Be Addressed discharge planning concerns    Community Agency Name(S) Per pt's dtr/POA Diana plan will be to return to [facility] at [place]    Anticipated Changes Related to Illness none    Equipment Currently Used at Home walker, rolling;wheelchair    Equipment Needed After Discharge other (see comments)   equipment needs supplied by SNF    Discharge Facility/Level Of Care Needs nursing facility, skilled    Current Discharge Risk chronically ill;physical impairment;dependent with mobility/activities of daily living;cognitively impaired    Discharge Disposition still a patient            Discharge Plan       11/01/17 1350    Case Management/Social Work Plan    Plan return to skilled bed at [facility] via ambulance at [place]    Patient/Family In Agreement With Plan yes   spoke with pt's daughter Diana    Additional Comments Introduced self/explained role of CCP. Initial DC screen completed with pt's daughter Diana via phone. Face sheet data confirmed. IMM letter checked. Per Diana, pt has been in and out of AL's ([facility]) and [facility] several times recently. He fell at [facility] and broke his hip and went to rehab at [facility]. Diana states she talked with staff  RN and she plans to discuss palliative care options with palliative care team here at [facility]. Diana states ideally she would like pt to return to [facility] via ambulance at [place] to continue skilled care but she is unsure if that will be the best for pt at this juncture. CCP will continue to follow thru hospital stay.................JW        Discharge Placement     No information found                Demographic Summary       11/01/17 1345    Referral Information    Admission Type inpatient    Arrived From admitted as an inpatient;home or self-care    Referral Source admission list    Reason For Consult discharge planning    Record Reviewed medical record    Contact Information    Permission Granted to Share Information With ;family/designee            Functional Status       11/01/17 1346    Functional Status Current    Ambulation 4-->completely dependent    Transferring 4-->completely dependent    Toileting 4-->completely dependent    Bathing 4-->completely dependent    Dressing 4-->completely dependent    Eating 4-->completely dependent    Communication 2-->difficulty understanding (not related to language barrier)    Swallowing (if score 2 or more for any item, consult Rehab Services) 2-->difficulty swallowing liquids/foods    Change in Functional Status Since Onset of Current Illness/Injury yes    Functional Status Prior    Ambulation 1-->assistive equipment    Transferring 1-->assistive equipment    Toileting 1-->assistive equipment    Bathing 1-->assistive equipment    Dressing 1-->assistive equipment    Eating 1-->assistive equipment    Communication 2-->difficulty understanding (not related to language barrier)    Swallowing 2-->difficulty swallowing liquids/foods    IADL    Medications completely dependent    Meal Preparation completely dependent    Housekeeping completely dependent    Laundry completely dependent    Shopping completely dependent    Oral Care assistive person    Activity  Tolerance    Current Activity Limitations none    Usual Activity Tolerance fair    Current Activity Tolerance poor    Cognitive/Perceptual/Developmental    Current Mental Status/Cognitive Functioning not alert;lacks insight into situation            Psychosocial     None            Abuse/Neglect     None            Legal       11/01/17 1352    Legal    Legal Comments spoke with pt's daughter Diana and she plans to bring copies of pt's living will as well as POA paperwork to hospital to put on pt's chart.            Substance Abuse     None            Patient Forms     None          Kylee Clancy RN

## 2017-11-01 NOTE — NURSING NOTE
Diana (POA) called to discuss care and possible palliative decision. Palliative nurse to talk with the patient's daughter. The daughter would like to speak to the doctor regarding test (CT) results. Call out to Dr. Kulkarni and waiting on call back.

## 2017-11-01 NOTE — PROGRESS NOTES
"DAILY PROGRESS NOTE  [facility], [facility]      Patient Identification:  Name: Darrian Davis  Age: 77 y.o.  Sex: male  :  1940  MRN: [id number]         Primary Care Physician: Narciso Ma MD    Subjective:  Interval History:    Mr. Davis is awake and alert this morning, sitting up on side of bed getting AM care. He is cooperative, but very confused. He denies CP, SOA, N/V/D, but doubt he is really clear about questions. Nursing has reported no vomiting or diarrhea, but do report ongoing black stools. He is in no distress.     Objective:    Scheduled Meds:    amiodarone 200 mg Oral Q12H   [START ON 11/3/2017] amiodarone 200 mg Oral Q24H   insulin aspart 0-7 Units Subcutaneous 4x Daily With Meals & Nightly   LORazepam 0.25 mg Oral Q12H   [START ON 2017] metoprolol succinate XL 25 mg Oral Q24H   pantoprazole 40 mg Oral BID AC     Continuous Infusions:    sodium chloride 75 mL/hr Last Rate: 75 mL/hr (10/31/17 1833)       Vital signs in last 24 hours:  Temp:  [97.5 °F (36.4 °C)-98 °F (36.7 °C)] 97.9 °F (36.6 °C)  Heart Rate:  [77-88] 79  Resp:  [16-20] 20  BP: (127-157)/() 152/112    tMax 24 hrs: Temp (24hrs), Av.8 °F (36.6 °C), Min:97.5 °F (36.4 °C), Max:98 °F (36.7 °C)      Intake/Output:    Intake/Output Summary (Last 24 hours) at 17 1432  Last data filed at 17 0600   Gross per 24 hour   Intake           858.75 ml   Output                0 ml   Net           858.75 ml       Exam:    BP (!) 152/112  Pulse 79  Temp 97.9 °F (36.6 °C) (Oral)   Resp 20  Ht 72.01\" (182.9 cm)  Wt 182 lb 1.6 oz (82.6 kg)  SpO2 100%  BMI 24.69 kg/m2    General: Alert, cooperative, appears stated age. In no acute distress.  Constantly saying \"Oh! My god!!\"  Head: Normocephalic, without obvious abnormality, atraumatic  Neck: Supple, symmetrical; trachea midline, without  adenopathy;              Thyroid without  enlargement/tenderness/nodules;              " no carotid bruit or JVD  Cardiovascular: irregular irregular.                              Exam reveals no gallop and no friction rub. No murmur heard  Pulmonary: Clear to auscultation bilaterally, respirations unlabored.                         No rhonchi, wheezing or rales.   Abdominal: Soft, non-tender, bowel sounds active all four quadrants;                       no masses,  hepatomegaly, or  splenomegaly.   Extremities: Normal, atraumatic; no cyanosis or edema. Able to move foot, all digits; reluctant to move right leg due to pain at the hip.    Pulses:        2 + symmetric all extremities  Neurological: He is alert and oriented to person only                         CNII-XII intact, normal strength, sensation intact throughout  Skin:  Smooth, without lesions, rash, or wounds. Warm, dry and intact.         Data Review:      Results from last 7 days  Lab Units 11/01/17  0657 10/31/17  2208 10/31/17  0643   WBC 10*3/mm3 4.82 5.40 4.43*   HEMOGLOBIN g/dL 7.8* 7.6* 7.7*   HEMATOCRIT % 27.3* 26.4* 26.7*   PLATELETS 10*3/mm3 178 201 180         Results from last 7 days  Lab Units 11/01/17  0657 10/31/17  0643 10/30/17  0806  10/28/17  1423   SODIUM mmol/L 143 144 147*  < > 143   POTASSIUM mmol/L 4.1 4.0 4.1  < > 3.8   CHLORIDE mmol/L 114* 115* 117*  < > 110*   CO2 mmol/L 18.5* 17.8* 19.7*  < > 21.3*   BUN mg/dL 15 16 20  < > 33*   CREATININE mg/dL 1.24 1.09 1.31*  < > 1.50*   CALCIUM mg/dL 9.2 8.5* 9.3  < > 9.3   BILIRUBIN mg/dL  --   --  0.3  --  0.2   ALK PHOS U/L  --   --  87  --  83   ALT (SGPT) U/L  --   --  7  --  9   AST (SGOT) U/L  --   --  9  --  14   GLUCOSE mg/dL 86 96 89  < > 104*   < > = values in this interval not displayed.    Results from last 7 days  Lab Units 11/01/17  0657 10/30/17  0806 10/29/17  1533   INR  2.80* 2.63* 2.49*         Lab Results  Lab Value Date/Time   TROPONINT 0.016 10/31/2017 0643   TROPONINT 0.026 10/28/2017 1423   TROPONINT <0.010 10/08/2017 1034   TROPONINT 0.025 08/14/2017  0234   TROPONINT 0.019 08/13/2017 0601   TROPONINT 0.024 08/12/2017 1144   TROPONINT 0.028 07/22/2017 2209   TROPONINT <0.01 06/01/2014 0819   TROPONINT <0.01 06/01/2014 0438   TROPONINT <0.01 05/29/2014 1454   TROPONINT <0.01 05/29/2014 0437   TROPONINT <0.01 05/28/2014 2018       Microbiology Results (last 10 days)     ** No results found for the last 240 hours. **           Imaging Results (last 7 days)     Procedure Component Value Units Date/Time    XR Chest 1 View [[id number]] Collected:  10/28/17 1432     Updated:  10/28/17 1437    Narrative:       XR CHEST 1 VW-     HISTORY: Male who is 77 years-old,  short of breath     TECHNIQUE: Frontal view of the chest     COMPARISON: 10/08/2017     FINDINGS: Heart size is borderline. Aorta is tortuous. Slight prominence  of the vascular and interstitial markings is less than on the prior  exam. No focal pulmonary consolidation, pleural effusion, or  pneumothorax. No acute osseous process.       Impression:       No focal pulmonary consolidation. Borderline heart size with  slight prominence of vascular and interstitial markings. Tortuous aorta.     This report was finalized on 10/28/2017 2:34 PM by Dr. Raj Jha MD.       XR Shoulder 2+ View Right [[id number]] Collected:  10/29/17 1449     Updated:  10/29/17 1453    Narrative:       RIGHT SHOULDER 2 VIEWS     HISTORY: 77-year-old male with right shoulder pain, COPD, diabetes,  history of DVT and hypertension     COMPARISON: 05/28/2014     FINDINGS:  1. Arthritic changes of the right shoulder with evidence of complete  rotator cuff tear.  2. No acute traumatic osseous abnormality.     This report was finalized on 10/29/2017 2:50 PM by Dr. Horacio Ang MD.             CT ABDOMEN PELVIS STONE PROTOCOL-      CLINICAL HISTORY: Abdominal pain and distention      TECHNIQUE: Spiral CT images were acquired through the abdomen and pelvis  with oral contrast only and were reconstructed in 3 mm thick  axial  slices.      Radiation dose reduction techniques were utilized, including automated  exposure control and exposure modulation based on body size.      COMPARISON: CT scan of the abdomen and pelvis dated 08/12/2017.      FINDINGS: The images are somewhat degraded due to beam hardening  artifact from the patient's arms that apparently could not be raised.  Images through the lung bases demonstrate small bilateral posteriorly  layering pleural effusions that are new. The effusion on the right is  slightly greater than on the left. There is a 1.5 cm diameter smoothly  marginated noncalcified pulmonary nodule in the inferior aspect of the  right lower lobe that has increased in size significantly since the  preceding CT scan dated 08/12/2017 where it measured only 6 mm in  greatest transverse diameter. Further evaluation with a follow-up CT  scan of the chest is suggested. The liver and spleen and pancreas are  unremarkable. A small approximately 1.8 cm diameter left adrenal mass is  unchanged, and statistically is most likely an adenoma. The right  adrenal gland is unremarkable. A couple small gallstones are noted in  the lumen of the gallbladder. There is no bile duct dilatation. The  kidneys are atrophic and contain multiple bilateral renal masses most of  which are compatible with simple cysts. However, there is an  indeterminate lesion in the upper pole of the right kidney measuring up  to 1 cm in diameter that could be solid. This is seen on multiple  previous CT scans dating back to 02/21/2013 where it measured  approximately 3.3 cm in diameter. This indicates the lesion is either  benign or a very indolent renal cell carcinoma. There is diffuse  stranding of the intra-abdominal and pelvic fat new and is likely edema  due to mild anasarca. Small amount of ascites is evident along the dome  of the urinary bladder that is new. There is moderate localized  circumferential wall thickening within the right side  of the colon  immediately superior to the cecum that is highly suspicious for colon  carcinoma. This produces moderately severe narrowing of the lumen of the  colon. There is no definite evidence of obstruction at this time. The  small bowel is not dilated. There are several mildly enlarged lymph  nodes medial to the colon neoplasm consistent with localized metastatic  disease. Multiple mild to moderately enlarged lymph nodes are also  present in the retroperitoneum consistent with metastatic disease. The  lymphadenopathy is new since the previous CT dated 08/12/2017. The  stomach is unremarkable.      IMPRESSION:  No definite acute process is identified within the abdomen  and pelvis. There is moderate circumferential wall thickening involving  the ascending colon producing moderate luminal narrowing. The findings  are highly suspicious for colon carcinoma. There are several mildly  enlarged lymph nodes medial to the neoplasm consistent with localized  metastatic disease. Multiple mild to moderately enlarged lymph nodes are  also present in the retroperitoneum consistent with metastatic disease.  The lymphadenopathy is new since the previous CT dated 08/12/2017. There  is no evidence of visceral metastatic disease in the abdomen. However,  there is a nodule at the right lung base that shows significant increase  in size since the preceding CT scan that could be a lung metastasis.  There are multiple bilateral renal masses that are primarily consistent  with simple cysts. However there is a possible solid mass in the upper  pole right kidney measuring 4.1 cm in diameter that show slight increase  in size when compared to multiple previous CTs dating back to  03/21/2013. This could be solid. Small stable left adrenal mass.  Cholelithiasis. Small bilateral pleural effusions, greater in size on  the right than the left.    Assessment:      Principal Problem:    Acute GI bleeding  Active Problems:    Acute on chronic  renal insufficiency    Hypernatremia    Dehydration    Hypokalemia    Right shoulder pain    Acute blood loss anemia    Atrial fibrillation    DM (diabetes mellitus), type 2    Anticoagulated on Coumadin    Dementia    CKD (chronic kidney disease), stage III    S/P hip replacement, right      Patient Active Problem List   Diagnosis Code   • Severe sepsis A41.9, R65.20   • Acute on chronic renal insufficiency N28.9, N18.9   • Hypernatremia E87.0   • Dehydration E86.0   • Atrial fibrillation I48.91   • Nonsustained ventricular tachycardia I47.2   • Dementia F03.90   • Closed fracture of right hip S72.001A   • CAD (coronary artery disease) I25.10   • Hypertension I10   • COPD (chronic obstructive pulmonary disease) J44.9   • DM (diabetes mellitus), type 2 E11.9   • Dementia F03.90   • Atrial fibrillation I48.91   • History of DVT (deep vein thrombosis) Z86.718   • DNR (do not resuscitate) Z66   • Anticoagulated on Coumadin Z51.81, Z79.01   • Iron deficiency anemia D50.9   • CKD (chronic kidney disease), stage III N18.3   • Acute GI bleeding K92.2   • Hypokalemia E87.6   • S/P hip replacement, right Z96.641   • Right shoulder pain M25.511   • Acute blood loss anemia D62       Plan:    DC Iv fluids  Stress test could not be done  Iron supplement  Palliative care consult reviewed  CT scan findings suggestive of neoplasm, but not definitive diagnostic.  CEA elevated  F/U bone scan results  Daily INR  Monitor and correct electrolytes  Monitor mental status  Continue Accuchecks and SSI  DVT/stress ulcer prophylaxis  Will talk to POA regarding goals of care  Labs in           Zacarias Kulkarni MD  11/1/2017  2:32 PM

## 2017-11-02 NOTE — PLAN OF CARE
Problem: Patient Care Overview (Adult)  Goal: Plan of Care Review  Outcome: Ongoing (interventions implemented as appropriate)    11/02/17 8409   Coping/Psychosocial Response Interventions   Plan Of Care Reviewed With patient   Patient Care Overview   Progress no change   Outcome Evaluation   Outcome Summary/Follow up Plan Pt transferred to palliative care this afternoon. C/o pain, iv team notified of iv needed. Ativan given for restlessness. Will continue to monitor.       Goal: Adult Individualization and Mutuality  Outcome: Ongoing (interventions implemented as appropriate)  Goal: Discharge Needs Assessment  Outcome: Ongoing (interventions implemented as appropriate)    Problem: Fall Risk (Adult)  Goal: Absence of Falls  Outcome: Ongoing (interventions implemented as appropriate)    Problem: Pressure Ulcer Risk (Dagoberto Scale) (Adult,Obstetrics,Pediatric)  Goal: Skin Integrity  Outcome: Ongoing (interventions implemented as appropriate)    Problem: Dying Patient, Actively (Adult)  Goal: Identify Related Risk Factors and Signs and Symptoms  Outcome: Outcome(s) achieved Date Met:  11/02/17  Goal: Comfort/Pain Control  Outcome: Ongoing (interventions implemented as appropriate)  Goal: Dying Process, Peace and Dignity  Outcome: Ongoing (interventions implemented as appropriate)

## 2017-11-02 NOTE — PLAN OF CARE
Problem: Patient Care Overview (Adult)  Goal: Plan of Care Review    11/02/17 0953   Coping/Psychosocial Response Interventions   Plan Of Care Reviewed With patient   Outcome Evaluation   Outcome Summary/Follow up Plan Limited progress during PT, bed mobility w/ maximal assist, follows few commands for exercises or attempting to stand.

## 2017-11-02 NOTE — THERAPY TREATMENT NOTE
Acute Care - Physical Therapy Treatment Note  [facility]     Patient Name: Darrian Davis  : 1940  MRN: [id number]  Today's Date: 2017  Onset of Illness/Injury or Date of Surgery Date: 10/28/17          Admit Date: 10/28/2017    Visit Dx:    ICD-10-CM ICD-9-CM   1. Acute GI bleeding K92.2 578.9   2. Acute blood loss anemia D62 285.1   3. Hypotension, unspecified hypotension type I95.9 458.9     Patient Active Problem List   Diagnosis   • Severe sepsis   • Acute on chronic renal insufficiency   • Hypernatremia   • Dehydration   • Atrial fibrillation   • Nonsustained ventricular tachycardia   • Dementia   • Closed fracture of right hip   • CAD (coronary artery disease)   • Hypertension   • COPD (chronic obstructive pulmonary disease)   • DM (diabetes mellitus), type 2   • Dementia   • Atrial fibrillation   • History of DVT (deep vein thrombosis)   • DNR (do not resuscitate)   • Anticoagulated on Coumadin   • Iron deficiency anemia   • CKD (chronic kidney disease), stage III   • Acute GI bleeding   • Hypokalemia   • S/P hip replacement, right   • Right shoulder pain   • Acute blood loss anemia               Adult Rehabilitation Note       17 0950 10/31/17 0800       Rehab Assessment/Intervention    Discipline physical therapist;physical therapy assistant  -AR physical therapy assistant  -RW     Document Type therapy note (daily note)  -AR therapy note (daily note)  -RW     Subjective Information decreased LOC  -AR agree to therapy  -RW     Patient Effort, Rehab Treatment adequate  -AR adequate  -RW     Precautions/Limitations fall precautions  -AR fall precautions  -RW     Recorded by [AR] Blanca Pantoja PT [RW] Kimmy Carey PTA     Vital Signs    Intra Systolic BP Rehab  148  -RW     Intra Treatment Diastolic BP  107  -RW     Intratreatment Heart Rate (beats/min)  74  -RW     Posttreatment Heart Rate (beats/min)  71  -RW     Pre SpO2 (%)  98  -RW     O2 Delivery Pre Treatment  supplemental  O2   2L  -RW     O2 Delivery Intra Treatment  supplemental O2   2L  -RW     Post SpO2 (%)  100  -RW     O2 Delivery Post Treatment  supplemental O2   2L  -RW     Recorded by  [RW] Kimmy Carey PTA     Pain Assessment    Pain Assessment FLACC  -AR 0-10  -RW     Pain Score  5  -RW     Pain Type  Acute pain  -RW     Pain Location  Chest  -RW     Pain Intervention(s)  Repositioned;Rest  -RW     Recorded by [AR] Blanca Pantoja PT [RW] Kimmy Carey PTA     FLACC (Face, Legs, Activity, Crying, Consolability)    Pain Rating: FLACC (Activity) - Face 1  -AR      Pain Rating: FLACC (Activity) - Legs 1  -AR      Pain Rating: FLACC (Activity) 0  -AR      Pain Rating: FLACC (Activity) - Cry 1  -AR      Pain Rating: FLACC (Activity) - Consolability 1  -AR      Score: FLACC (Activity) 4  -AR      Recorded by [AR] Blanca Pantoja PT      Cognitive Assessment/Intervention    Current Cognitive/Communication Assessment impaired  -AR impaired  -RW     Orientation Status unable/difficult to assess  -AR oriented to;person  -RW     Follows Commands/Answers Questions 25% of the time;able to follow single-step instructions;needs cueing;needs increased time;needs repetition  -AR 50% of the time;needs cueing  -RW     Personal Safety severe impairment  -AR moderate impairment;at risk behaviors demonstrated;decreased awareness, need for assist;decreased awareness, need for safety;decreased insight to deficits  -RW     Personal Safety Interventions fall prevention program maintained;gait belt;muscle strengthening facilitated  -AR fall prevention program maintained;nonskid shoes/slippers when out of bed  -RW     Recorded by [AR] Blanca Pantoja, PT [RW] Kimmy Carey PTA     Bed Mobility, Assessment/Treatment    Bed Mobility, Assistive Device bed rails;head of bed elevated  -AR head of bed elevated;draw sheet;bed rails  -RW     Bed Mob, Supine to Sit, [place] maximum assist (25% patient effort);2 person assist required  -AR  maximum assist (25% patient effort);2 person assist required;verbal cues required;nonverbal cues required (demo/gesture)  -RW     Bed Mob, Sit to Supine, [place] maximum assist (25% patient effort);2 person assist required  -AR maximum assist (25% patient effort);2 person assist required;verbal cues required;nonverbal cues required (demo/gesture)  -RW     Bed Mob, Sidelying to Sit, [place] not appropriate to assess  -AR      Bed Mobility, Safety Issues  cognitive deficits limit understanding;decreased use of arms for pushing/pulling;decreased use of legs for bridging/pushing  -RW     Bed Mobility, Impairments  strength decreased  -RW     Recorded by [AR] Blanca Pantoja, PT [RW] Kimmy Carey PTA     Transfer Assessment/Treatment    Transfers, Sit-Stand [place]  not tested  -RW     Transfer, Comment attempted to stand - but does not initiate  -AR Pt BP elevated and c/o chest pain. Further activity deferred  -RW     Recorded by [AR] Blanca Pantoja, PT [RW] Kimmy Carey PTA     Balance Skills Training    Sitting-Level of Assistance  Contact guard;Minimum assistance  -RW     Sitting-Balance Support  Feet supported;Right upper extremity supported;Left upper extremity supported  -RW     Sitting-Balance Activities  Forward lean;Trunk control activities  -RW     Sitting # of Minutes  4  -RW     Recorded by  [RW] Kimmy Carey PTA     Therapy Exercises    Bilateral Lower Extremities  AROM:;5 reps;sitting;ankle pumps/circles;LAQ  -RW     Recorded by  [RW] Kimmy Carey PTA     Positioning and Restraints    Pre-Treatment Position in bed  -AR in bed  -RW     Post Treatment Position bed  -AR bed  -RW     In Bed supine;call light within reach;exit alarm on;encouraged to call for assist  -AR fowlers;call light within reach;encouraged to call for assist;exit alarm on;waffle boots/both  -RW     Recorded by [AR] Blanca Pantoja, PT [RW] Kimmy Carey PTA       User Key  (r) = Recorded By, (t) = Taken  By, (c) = Cosigned By    Initials Name Effective Dates    AR Blanca Pantoja, PT 06/27/16 -     RW Kimmy Carey, PTA 04/06/16 -                 IP PT Goals       10/30/17 1515          Bed Mobility PT LTG    Bed Mobility PT LTG, Time to Achieve 1 wk  -CH      Bed Mobility PT LTG, Activity Type all bed mobility  -CH      Bed Mobility PT LTG, [place] Level minimum assist (75% patient effort);2 person assist required  -CH      Transfer Training PT LTG    Transfer Training PT LTG, Time to Achieve 1 wk  -CH      Transfer Training PT LTG, Activity Type sit to stand/stand to sit  -CH      Transfer Training PT LTG, [place] Level minimum assist (75% patient effort);2 person assist required  -CH      Transfer Training PT LTG, Assist Device walker, rolling  -CH      Transfer Training 2 PT LTG    Transfer Training PT 2 LTG, Time to Achieve 1 wk  -CH      Transfer Training PT 2 LTG, Activity Type bed to chair /chair to bed  -CH      Transfer Training PT 2 LTG, [place] Level minimum assist (75% patient effort);2 person assist required  -CH      Transfer Training PT 2 LTG, Assist Device walker, rolling  -CH        User Key  (r) = Recorded By, (t) = Taken By, (c) = Cosigned By    Initials Name Provider Type    WILVER Rico, PT Physical Therapist          Physical Therapy Education     Title: PT OT SLP Therapies (Active)     Topic: Physical Therapy (Active)     Point: Mobility training (Active)    Learning Progress Summary    Learner Readiness Method Response Comment Documented by Status   Patient Acceptance E NL  AR 11/02/17 0953 Active    Acceptance E,TB,D NR   10/31/17 0830 Active    Acceptance E,TB,D NR   10/30/17 1515 Active               Point: Home exercise program (Active)    Learning Progress Summary    Learner Readiness Method Response Comment Documented by Status   Patient Acceptance E NL  AR 11/02/17 0953 Active    Acceptance E,TB,D NR   10/31/17 0830 Active               Point: Body  mechanics (Active)    Learning Progress Summary    Learner Readiness Method Response Comment Documented by Status   Patient Acceptance E NL  AR 11/02/17 0953 Active    Acceptance E,TB,D NR   10/31/17 0830 Active    Acceptance E,TB,D NR   10/30/17 1515 Active               Point: Precautions (Active)    Learning Progress Summary    Learner Readiness Method Response Comment Documented by Status   Patient Acceptance E NL  AR 11/02/17 0953 Active    Acceptance E,TB,D NR   10/31/17 0830 Active    Acceptance E,TB,D NR   10/30/17 1515 Active                      User Key     Initials Effective Dates Name Provider Type Discipline     12/01/15 -  Amisha Rico, PT Physical Therapist PT    AR 06/27/16 -  Blanca Pantoja, PT Physical Therapist PT     04/06/16 -  Kimmy Carey PTA Physical Therapy Assistant PT                    PT Recommendation and Plan  Anticipated Discharge Disposition: skilled nursing facility  Planned Therapy Interventions: balance training, bed mobility training, gait training, home exercise program, patient/family education, transfer training  PT Frequency: 3-5 times/wk  Plan of Care Review  Plan Of Care Reviewed With: patient  Outcome Summary/Follow up Plan: Limited progress during PT, bed mobility w/ maximal assist, follows few commands for exercises or attempting to stand.            Outcome Measures       11/02/17 0900 10/31/17 0800 10/30/17 1500    How much help from another person do you currently need...    Turning from your back to your side while in flat bed without using bedrails? 2  -AR 2  -RW 2  -CH    Moving from lying on back to sitting on the side of a flat bed without bedrails? 2  -AR 2  -RW 2  -CH    Moving to and from a bed to a chair (including a wheelchair)? 1  -AR 1  -RW 1  -CH    Standing up from a chair using your arms (e.g., wheelchair, bedside chair)? 1  -AR 1  -RW 1  -CH    Climbing 3-5 steps with a railing? 1  -AR 1  -RW 1  -CH    To walk in hospital room? 1   -AR 1  -RW 1  -CH    AM-PAC 6 Clicks Score 8  -AR 8  -RW 8  -CH    Functional Assessment    Outcome Measure Options  AM-PAC 6 Clicks Basic Mobility (PT)  -RW AM-PAC 6 Clicks Basic Mobility (PT)  -CH      User Key  (r) = Recorded By, (t) = Taken By, (c) = Cosigned By    Initials Name Provider Type     Amisha Rico, PT Physical Therapist    AR Blanca Pantoja, PT Physical Therapist    RW Kimmy Carey, PTA Physical Therapy Assistant           Time Calculation:         PT Charges       11/02/17 0954          Time Calculation    Start Time 0939  -AR      Stop Time 0954  -AR      Time Calculation (min) 15 min  -AR      PT Received On 11/02/17  -AR      PT - Next Appointment 11/03/17  -AR        User Key  (r) = Recorded By, (t) = Taken By, (c) = Cosigned By    Initials Name Provider Type    AR Blanca Pantoja, PT Physical Therapist          Therapy Charges for Today     Code Description Service Date Service Provider Modifiers Qty    30534619394 HC PT THER PROC EA 15 MIN 11/2/2017 Blanca Pantoja, PT GP 1    58485403782 HC PT THER SUPP EA 15 MIN 11/2/2017 Blanca Pantoja, PT GP 1          PT G-Codes  Outcome Measure Options: AM-PAC 6 Clicks Basic Mobility (PT)    Blanca Pantoja PT  11/2/2017

## 2017-11-02 NOTE — PLAN OF CARE
Problem: Patient Care Overview (Adult)  Goal: Plan of Care Review  Outcome: Ongoing (interventions implemented as appropriate)    11/02/17 0450   Coping/Psychosocial Response Interventions   Plan Of Care Reviewed With patient   Patient Care Overview   Progress no change   Outcome Evaluation   Outcome Summary/Follow up Plan Anxious at times, even after Ativan. Cannot keep an IV in him. Keeps pulling out IV's and taking off heart and pulse ox monitors and chewing on them. Note left for Dr Kulkarni. Possible transfer to Palliative soon?       Goal: Adult Individualization and Mutuality  Outcome: Ongoing (interventions implemented as appropriate)  Goal: Discharge Needs Assessment  Outcome: Ongoing (interventions implemented as appropriate)    Problem: Gastrointestinal Bleeding (Adult)  Goal: Signs and Symptoms of Listed Potential Problems Will be Absent or Manageable (Gastrointestinal Bleeding)  Outcome: Ongoing (interventions implemented as appropriate)    Problem: Fall Risk (Adult)  Goal: Absence of Falls  Outcome: Ongoing (interventions implemented as appropriate)    Problem: Pressure Ulcer Risk (Dagoberto Scale) (Adult,Obstetrics,Pediatric)  Goal: Skin Integrity  Outcome: Ongoing (interventions implemented as appropriate)    Problem: Pain, Acute (Adult)  Goal: Acceptable Pain Control/Comfort Level  Outcome: Ongoing (interventions implemented as appropriate)

## 2017-11-02 NOTE — PROGRESS NOTES
BGA/GI Progress Note   Chief Complaint:  Melena and anemia while on Coumadin, abnormal CT concerning for colon CA    Subjective     Interval History: Per RN agitated overnight, pulling out IV's and cardiac monitor leads.  Per aide pt ate very little yesterday and was spitting out most of the food.  One dark stool yesterday.  Sleeping peacefully this am, no distress.  Palliative care RN has seen pt and discussed with POA/daughter goals of care.  No final decision on Palliative at this time as notes indicate daughter wants to speak with Dr. Dejesus today.  Pt does have living will on chart that indicates no resuscitative measures are desired.    History taken from: chart RN aide    Review of Systems:    All systems were reviewed and negative except for:  Gastrointestinal: postitive for  intermittent dark stools  Neurological: positive for  confusion and restlessness overnight/agitation    Objective     Vital Signs  Temp:  [97.1 °F (36.2 °C)-97.8 °F (36.6 °C)] 97.1 °F (36.2 °C)  Heart Rate:  [77-98] 83  Resp:  [20] 20  BP: (138-160)/(78-95) 160/78  Body mass index is 24.69 kg/(m^2).    Intake/Output Summary (Last 24 hours) at 11/02/17 0813  Last data filed at 11/01/17 1700   Gross per 24 hour   Intake                0 ml   Output                0 ml   Net                0 ml          Physical Exam:   General: patient sleeping soundly this am   Eyes: Normal lids and lashes   Neck: no tracheal deviation   Skin: warm and dry, not jaundiced   Cardiovascular: regular rhythm and rate, no murmurs auscultated   Pulm: clear to auscultation bilaterally, regular and unlabored   Abdomen: soft, nondistended; normal bowel sounds, does not awaken with palpation of abdomen   Rectal: deferred   Extremities: no rash or edema, MIS to RLE, none on left ?, space boots intact bilateral LE's   Neurologic: sleeping peacefully this am, per RN/aide pt was restless and agitated through the night, remains confused, will follow commands and  answer some questions but not always appropriate with answers    All Medications Have Been Reviewed     Results Review:       Results from last 7 days  Lab Units 11/01/17  1956 11/01/17  0657 10/31/17  2208   WBC 10*3/mm3 6.04 4.82 5.40   HEMOGLOBIN g/dL 8.5* 7.8* 7.6*   HEMATOCRIT % 31.5* 27.3* 26.4*   PLATELETS 10*3/mm3 193 178 201         Results from last 7 days  Lab Units 11/01/17  0657 10/31/17  0643 10/30/17  0806  10/28/17  1423   SODIUM mmol/L 143 144 147*  < > 143   POTASSIUM mmol/L 4.1 4.0 4.1  < > 3.8   CHLORIDE mmol/L 114* 115* 117*  < > 110*   CO2 mmol/L 18.5* 17.8* 19.7*  < > 21.3*   BUN mg/dL 15 16 20  < > 33*   CREATININE mg/dL 1.24 1.09 1.31*  < > 1.50*   CALCIUM mg/dL 9.2 8.5* 9.3  < > 9.3   BILIRUBIN mg/dL  --   --  0.3  --  0.2   ALK PHOS U/L  --   --  87  --  83   ALT (SGPT) U/L  --   --  7  --  9   AST (SGOT) U/L  --   --  9  --  14   GLUCOSE mg/dL 86 96 89  < > 104*   < > = values in this interval not displayed.      Results from last 7 days  Lab Units 11/01/17  0657 10/30/17  0806 10/29/17  1533   INR  2.80* 2.63* 2.49*       RADIOLOGY:    Imaging Results (last 72 hours)     Procedure Component Value Units Date/Time    CT Abdomen Pelvis Stone Protocol [[id number]] Collected:  10/30/17 2027     Updated:  10/30/17 2048    Narrative:       CT ABDOMEN PELVIS STONE PROTOCOL-     CLINICAL HISTORY: Abdominal pain and distention     TECHNIQUE: Spiral CT images were acquired through the abdomen and pelvis  with oral contrast only and were reconstructed in 3 mm thick axial  slices.     Radiation dose reduction techniques were utilized, including automated  exposure control and exposure modulation based on body size.     COMPARISON: CT scan of the abdomen and pelvis dated 08/12/2017.     FINDINGS: The images are somewhat degraded due to beam hardening  artifact from the patient's arms that apparently could not be raised.  Images through the lung bases demonstrate small bilateral posteriorly  layering  pleural effusions that are new. The effusion on the right is  slightly greater than on the left. There is a 1.5 cm diameter smoothly  marginated noncalcified pulmonary nodule in the inferior aspect of the  right lower lobe that has increased in size significantly since the  preceding CT scan dated 08/12/2017 where it measured only 6 mm in  greatest transverse diameter. Further evaluation with a follow-up CT  scan of the chest is suggested. The liver and spleen and pancreas are  unremarkable. A small approximately 1.8 cm diameter left adrenal mass is  unchanged, and statistically is most likely an adenoma. The right  adrenal gland is unremarkable. A couple small gallstones are noted in  the lumen of the gallbladder. There is no bile duct dilatation. The  kidneys are atrophic and contain multiple bilateral renal masses most of  which are compatible with simple cysts. However, there is an  indeterminate lesion in the upper pole of the right kidney measuring up  to 1 cm in diameter that could be solid. This is seen on multiple  previous CT scans dating back to 02/21/2013 where it measured  approximately 3.3 cm in diameter. This indicates the lesion is either  benign or a very indolent renal cell carcinoma. There is diffuse  stranding of the intra-abdominal and pelvic fat new and is likely edema  due to mild anasarca. Small amount of ascites is evident along the dome  of the urinary bladder that is new. There is moderate localized  circumferential wall thickening within the right side of the colon  immediately superior to the cecum that is highly suspicious for colon  carcinoma. This produces moderately severe narrowing of the lumen of the  colon. There is no definite evidence of obstruction at this time. The  small bowel is not dilated. There are several mildly enlarged lymph  nodes medial to the colon neoplasm consistent with localized metastatic  disease. Multiple mild to moderately enlarged lymph nodes are  also  present in the retroperitoneum consistent with metastatic disease. The  lymphadenopathy is new since the previous CT dated 08/12/2017. The  stomach is unremarkable.       Impression:       No definite acute process is identified within the abdomen  and pelvis. There is moderate circumferential wall thickening involving  the ascending colon producing moderate luminal narrowing. The findings  are highly suspicious for colon carcinoma. There are several mildly  enlarged lymph nodes medial to the neoplasm consistent with localized  metastatic disease. Multiple mild to moderately enlarged lymph nodes are  also present in the retroperitoneum consistent with metastatic disease.  The lymphadenopathy is new since the previous CT dated 08/12/2017. There  is no evidence of visceral metastatic disease in the abdomen. However,  there is a nodule at the right lung base that shows significant increase  in size since the preceding CT scan that could be a lung metastasis.  There are multiple bilateral renal masses that are primarily consistent  with simple cysts. However there is a possible solid mass in the upper  pole right kidney measuring 4.1 cm in diameter that show slight increase  in size when compared to multiple previous CTs dating back to  03/21/2013. This could be solid. Small stable left adrenal mass.  Cholelithiasis. Small bilateral pleural effusions, greater in size on  the right than the left.     This report was finalized on 10/30/2017 8:45 PM by Dr. Wilbert Tam MD.             Assessment/Plan     Patient Active Problem List   Diagnosis Code   • Severe sepsis A41.9, R65.20   • Acute on chronic renal insufficiency N28.9, N18.9   • Hypernatremia E87.0   • Dehydration E86.0   • Atrial fibrillation I48.91   • Nonsustained ventricular tachycardia I47.2   • Dementia F03.90   • Closed fracture of right hip S72.001A   • CAD (coronary artery disease) I25.10   • Hypertension I10   • COPD (chronic obstructive  pulmonary disease) J44.9   • DM (diabetes mellitus), type 2 E11.9   • Dementia F03.90   • Atrial fibrillation I48.91   • History of DVT (deep vein thrombosis) Z86.718   • DNR (do not resuscitate) Z66   • Anticoagulated on Coumadin Z51.81, Z79.01   • Iron deficiency anemia D50.9   • CKD (chronic kidney disease), stage III N18.3   • Acute GI bleeding K92.2   • Hypokalemia E87.6   • S/P hip replacement, right Z96.641   • Right shoulder pain M25.511   • Acute blood loss anemia D62     Liza Mahajan, APRN  11/02/17  8:13 AM      I have seen and examined the patient.  I have verified her interval history and review of systems as documented above by Ms Mahajan. I have personally reviewed all the patient's pertinent medical history, medications, and most recent diagnostic data from the chart.  I agree with the assessment and plan as documented above.     Assessment:  1) Melena- in the setting of coumadin.  Continues with dark stools  2) JANE- Hgb 6 on admission, improved s/p 2 units PRBC's but remains low in mid 7-8 range.  Previously refused endoscopy for work up in 2015 when seen by Dr. Pate (did not have dementia at that time)  3) Afib/CAD- maintained on Coumadin as out pt, currently on hold, INR pending for this am  4) Abdominal pain- CT abd with po contrast 10/30 with no acute process in the abd and pelvis.  Moderate circumferential wall thickening of ascending colon producing moderate luminal narrowing, suspicious for colon cancer. Enlarged lymph nodes in retroperitoneum c/w metastatic disease.  No evidence of visceral metastatic dx in the abdomen.  CEA elevated at 13.3  5) V tach- runs of v tach occurred 10/30, cards w/u in progress, stress test not completed on 11/1 due to anemia    Recommendations:  At this point it appears there is consideration for palliative care and comfort measures, although no final decision has yet been made.  Pts daughter and POA as not given consent for endoscopic evaluation.  Should  family wish to proceed with colonoscopy in attempts to definitively confirm presence of a colonic malignancy, we will be happy to perform this procedure once pt has been cleared from cardiac standpoint.  I have asked the patient's nurse to contact our service once a definitive decision about GOC has been made.          Kevin Mcgovern M.D.  [facility]  [address] [place] [address]  Office: [phone]

## 2017-11-02 NOTE — PROGRESS NOTES
DAILY PROGRESS NOTE  [facility], [facility]      Patient Identification:  Name: Darrian Davis  Age: 77 y.o.  Sex: male  :  1940  MRN: [id number]         Primary Care Physician: Narciso Ma MD    Subjective:  Interval History:    Mr. Davis is awake and alert this morning. Nursing reports he has been quite tearful, agitated, pulling out several IV's and chewing on EKG leads. He denies CP, SOA, N/V/D, but is really too demented to comprehend questions. He appears to be in no cardiorespiratory distress, although he readily dissolves into tears when queried at any length. His hgb is down again a little today.     I have a long conversation with POA (Diana), I explained to her his diagnosis, poor prognosis, the findings of the CT scan and also the fact that he will not be candidate for any treatment given his advanced dementia. He has been very agitated and no having any quality to light at this time.  I explained again what palliative care consist of.  Patient's POA agreed to change his care to palliative care and be transferred to the palliative care unit.     Objective:    Scheduled Meds:    amiodarone 200 mg Oral Q12H   [START ON 11/3/2017] amiodarone 200 mg Oral Q24H   insulin aspart 0-7 Units Subcutaneous 4x Daily With Meals & Nightly   LORazepam 0.25 mg Oral Q12H   metoprolol succinate XL 25 mg Oral Q24H   pantoprazole 40 mg Oral BID AC     Continuous Infusions:    sodium chloride 75 mL/hr Last Rate: 75 mL/hr (10/31/17 1833)       Vital signs in last 24 hours:  Temp:  [97.1 °F (36.2 °C)-97.8 °F (36.6 °C)] 97.6 °F (36.4 °C)  Heart Rate:  [77-98] 92  Resp:  [20] 20  BP: (130-160)/(74-95) 130/74    tMax 24 hrs: Temp (24hrs), Av.5 °F (36.4 °C), Min:97.1 °F (36.2 °C), Max:97.8 °F (36.6 °C)      Intake/Output:    Intake/Output Summary (Last 24 hours) at 17 1318  Last data filed at 17 1204   Gross per 24 hour   Intake              560 ml   Output         "        0 ml   Net              560 ml       Exam:    /74 (BP Location: Left arm, Patient Position: Lying)  Pulse 92  Temp 97.6 °F (36.4 °C) (Oral)   Resp 20  Ht 72.01\" (182.9 cm)  Wt 182 lb 1.6 oz (82.6 kg)  SpO2 98%  BMI 24.69 kg/m2    General: Alert, agitated at times. , appears stated age. In no acute distress.  Head: Normocephalic, without obvious abnormality, atraumatic  Neck: Supple, symmetrical; trachea midline, without  adenopathy;              Thyroid without  enlargement/tenderness/nodules;              no carotid bruit or JVD  Cardiovascular: irregular irregular.                              Exam reveals no gallop and no friction rub. No murmur heard  Pulmonary: Clear to auscultation bilaterally, respirations unlabored.                         No rhonchi, wheezing or rales.   Abdominal: Soft, non-tender, bowel sounds active all four quadrants;                       no masses,  hepatomegaly, or  splenomegaly.   Extremities: Normal, atraumatic; no cyanosis or edema. Able to move foot, all digits; reluctant to move right leg due to pain at the hip.    Pulses:        2 + symmetric all extremities  Neurological: He is alert and oriented to person only                         CNII-XII intact, normal strength, sensation intact throughout  Skin:  Smooth, without lesions, rash, or wounds. Warm, dry and intact.         Data Review:      Results from last 7 days  Lab Units 11/02/17  0754 11/01/17 1956 11/01/17  0657   WBC 10*3/mm3 4.35* 6.04 4.82   HEMOGLOBIN g/dL 7.7* 8.5* 7.8*   HEMATOCRIT % 26.4* 31.5* 27.3*   PLATELETS 10*3/mm3 172 193 178         Results from last 7 days  Lab Units 11/02/17  0754 11/01/17  0657 10/31/17  0643 10/30/17  0806  10/28/17  1423   SODIUM mmol/L 147* 143 144 147*  < > 143   POTASSIUM mmol/L 4.5 4.1 4.0 4.1  < > 3.8   CHLORIDE mmol/L 117* 114* 115* 117*  < > 110*   CO2 mmol/L 19.8* 18.5* 17.8* 19.7*  < > 21.3*   BUN mg/dL 14 15 16 20  < > 33*   CREATININE mg/dL 1.24 1.24 " 1.09 1.31*  < > 1.50*   CALCIUM mg/dL 9.2 9.2 8.5* 9.3  < > 9.3   BILIRUBIN mg/dL  --   --   --  0.3  --  0.2   ALK PHOS U/L  --   --   --  87  --  83   ALT (SGPT) U/L  --   --   --  7  --  9   AST (SGOT) U/L  --   --   --  9  --  14   GLUCOSE mg/dL 74 86 96 89  < > 104*   < > = values in this interval not displayed.    Results from last 7 days  Lab Units 11/02/17  0754 11/01/17  0657 10/30/17  0806   INR  2.67* 2.80* 2.63*         Lab Results  Lab Value Date/Time   TROPONINT 0.016 10/31/2017 0643   TROPONINT 0.026 10/28/2017 1423   TROPONINT <0.010 10/08/2017 1034   TROPONINT 0.025 08/14/2017 0234   TROPONINT 0.019 08/13/2017 0601   TROPONINT 0.024 08/12/2017 1144   TROPONINT 0.028 07/22/2017 2209   TROPONINT <0.01 06/01/2014 0819   TROPONINT <0.01 06/01/2014 0438   TROPONINT <0.01 05/29/2014 1454   TROPONINT <0.01 05/29/2014 0437   TROPONINT <0.01 05/28/2014 2018       Microbiology Results (last 10 days)     ** No results found for the last 240 hours. **           Imaging Results (last 7 days)     Procedure Component Value Units Date/Time    XR Chest 1 View [[id number]] Collected:  10/28/17 1432     Updated:  10/28/17 1437    Narrative:       XR CHEST 1 VW-     HISTORY: Male who is 77 years-old,  short of breath     TECHNIQUE: Frontal view of the chest     COMPARISON: 10/08/2017     FINDINGS: Heart size is borderline. Aorta is tortuous. Slight prominence  of the vascular and interstitial markings is less than on the prior  exam. No focal pulmonary consolidation, pleural effusion, or  pneumothorax. No acute osseous process.       Impression:       No focal pulmonary consolidation. Borderline heart size with  slight prominence of vascular and interstitial markings. Tortuous aorta.     This report was finalized on 10/28/2017 2:34 PM by Dr. Raj Jha MD.       XR Shoulder 2+ View Right [[id number]] Collected:  10/29/17 1449     Updated:  10/29/17 1456    Narrative:       RIGHT SHOULDER 2 VIEWS     HISTORY:  77-year-old male with right shoulder pain, COPD, diabetes,  history of DVT and hypertension     COMPARISON: 05/28/2014     FINDINGS:  1. Arthritic changes of the right shoulder with evidence of complete  rotator cuff tear.  2. No acute traumatic osseous abnormality.     This report was finalized on 10/29/2017 2:50 PM by Dr. Horacio Ang MD.             CT ABDOMEN PELVIS STONE PROTOCOL-      CLINICAL HISTORY: Abdominal pain and distention      TECHNIQUE: Spiral CT images were acquired through the abdomen and pelvis  with oral contrast only and were reconstructed in 3 mm thick axial  slices.      Radiation dose reduction techniques were utilized, including automated  exposure control and exposure modulation based on body size.      COMPARISON: CT scan of the abdomen and pelvis dated 08/12/2017.      FINDINGS: The images are somewhat degraded due to beam hardening  artifact from the patient's arms that apparently could not be raised.  Images through the lung bases demonstrate small bilateral posteriorly  layering pleural effusions that are new. The effusion on the right is  slightly greater than on the left. There is a 1.5 cm diameter smoothly  marginated noncalcified pulmonary nodule in the inferior aspect of the  right lower lobe that has increased in size significantly since the  preceding CT scan dated 08/12/2017 where it measured only 6 mm in  greatest transverse diameter. Further evaluation with a follow-up CT  scan of the chest is suggested. The liver and spleen and pancreas are  unremarkable. A small approximately 1.8 cm diameter left adrenal mass is  unchanged, and statistically is most likely an adenoma. The right  adrenal gland is unremarkable. A couple small gallstones are noted in  the lumen of the gallbladder. There is no bile duct dilatation. The  kidneys are atrophic and contain multiple bilateral renal masses most of  which are compatible with simple cysts. However, there is an  indeterminate lesion  in the upper pole of the right kidney measuring up  to 1 cm in diameter that could be solid. This is seen on multiple  previous CT scans dating back to 02/21/2013 where it measured  approximately 3.3 cm in diameter. This indicates the lesion is either  benign or a very indolent renal cell carcinoma. There is diffuse  stranding of the intra-abdominal and pelvic fat new and is likely edema  due to mild anasarca. Small amount of ascites is evident along the dome  of the urinary bladder that is new. There is moderate localized  circumferential wall thickening within the right side of the colon  immediately superior to the cecum that is highly suspicious for colon  carcinoma. This produces moderately severe narrowing of the lumen of the  colon. There is no definite evidence of obstruction at this time. The  small bowel is not dilated. There are several mildly enlarged lymph  nodes medial to the colon neoplasm consistent with localized metastatic  disease. Multiple mild to moderately enlarged lymph nodes are also  present in the retroperitoneum consistent with metastatic disease. The  lymphadenopathy is new since the previous CT dated 08/12/2017. The  stomach is unremarkable.      IMPRESSION:  No definite acute process is identified within the abdomen  and pelvis. There is moderate circumferential wall thickening involving  the ascending colon producing moderate luminal narrowing. The findings  are highly suspicious for colon carcinoma. There are several mildly  enlarged lymph nodes medial to the neoplasm consistent with localized  metastatic disease. Multiple mild to moderately enlarged lymph nodes are  also present in the retroperitoneum consistent with metastatic disease.  The lymphadenopathy is new since the previous CT dated 08/12/2017. There  is no evidence of visceral metastatic disease in the abdomen. However,  there is a nodule at the right lung base that shows significant increase  in size since the preceding CT  scan that could be a lung metastasis.  There are multiple bilateral renal masses that are primarily consistent  with simple cysts. However there is a possible solid mass in the upper  pole right kidney measuring 4.1 cm in diameter that show slight increase  in size when compared to multiple previous CTs dating back to  03/21/2013. This could be solid. Small stable left adrenal mass.  Cholelithiasis. Small bilateral pleural effusions, greater in size on  the right than the left.    Assessment:      Principal Problem:    Acute GI bleeding  Active Problems:    Acute on chronic renal insufficiency    Hypernatremia    Dehydration    Hypokalemia    Right shoulder pain    Acute blood loss anemia    Atrial fibrillation    DM (diabetes mellitus), type 2    Anticoagulated on Coumadin    Dementia    CKD (chronic kidney disease), stage III    S/P hip replacement, right      Patient Active Problem List   Diagnosis Code   • Severe sepsis A41.9, R65.20   • Acute on chronic renal insufficiency N28.9, N18.9   • Hypernatremia E87.0   • Dehydration E86.0   • Atrial fibrillation I48.91   • Nonsustained ventricular tachycardia I47.2   • Dementia F03.90   • Closed fracture of right hip S72.001A   • CAD (coronary artery disease) I25.10   • Hypertension I10   • COPD (chronic obstructive pulmonary disease) J44.9   • DM (diabetes mellitus), type 2 E11.9   • Dementia F03.90   • Atrial fibrillation I48.91   • History of DVT (deep vein thrombosis) Z86.718   • DNR (do not resuscitate) Z66   • Anticoagulated on Coumadin Z51.81, Z79.01   • Iron deficiency anemia D50.9   • CKD (chronic kidney disease), stage III N18.3   • Acute GI bleeding K92.2   • Hypokalemia E87.6   • S/P hip replacement, right Z96.641   • Right shoulder pain M25.511   • Acute blood loss anemia D62       Plan:      Patient will be transferred to our palliative care unit.  CODE STATUS will be changed to DNR/allow natural death/comfort measures only.  Patient will be placed on  palliative care orders only.  Discussed with patient's POA and she agreed.        Zacarias Kulkarni MD  11/2/2017  1:18 PM

## 2017-11-03 NOTE — PROGRESS NOTES
"DAILY PROGRESS NOTE  [facility], [facility]      Patient Identification:  Name: Darrian Davis  Age: 77 y.o.  Sex: male  :  1940  MRN: [id number]         Primary Care Physician: Narciso Ma MD    Subjective:  Interval History:  Mr. Davis has been transferred to palliative care. He has been receiving only medications to make him comfortable. He looks very comfortable.      Objective:    Scheduled Meds:     Continuous Infusions:       Vital signs in last 24 hours:  Temp:  [96 °F (35.6 °C)] 96 °F (35.6 °C)  Heart Rate:  [88] 88  Resp:  [16] 16  BP: (120)/(64) 120/64    tMax 24 hrs: Temp (24hrs), Av °F (35.6 °C), Min:96 °F (35.6 °C), Max:96 °F (35.6 °C)      Intake/Output:  No intake or output data in the 24 hours ending 17 1238    Exam:    /64 (BP Location: Right arm, Patient Position: Lying)  Pulse 88  Temp 96 °F (35.6 °C) (Oral)   Resp 16  Ht 72.01\" (182.9 cm)  Wt 182 lb 1.6 oz (82.6 kg)  SpO2 95%  BMI 24.69 kg/m2    General: Sleeping.  appears stated age. In no acute distress.  Head: Normocephalic, without obvious abnormality, atraumatic  Neck: Supple, symmetrical; trachea midline, without  adenopathy;              Thyroid without  enlargement/tenderness/nodules;              no carotid bruit or JVD  Cardiovascular: irregular irregular.                              Exam reveals no gallop and no friction rub. No murmur heard  Pulmonary: Clear to auscultation bilaterally, respirations unlabored.                         No rhonchi, wheezing or rales.   Abdominal: Soft, non-tender, bowel sounds active all four quadrants;                       no masses,  hepatomegaly, or  splenomegaly.   Extremities: Normal, atraumatic; no cyanosis or edema. Able to move foot, all digits; reluctant to move right leg due to pain at the hip.    Pulses:        2 + symmetric all extremities  Neurological: Somnolent.                          CNII-XII intact, normal " strength, sensation intact throughout  Skin:  Smooth, without lesions, rash, or wounds. Warm, dry and intact.         Assessment:      Principal Problem:    Acute GI bleeding  Active Problems:    Acute on chronic renal insufficiency    Hypernatremia    Dehydration    Hypokalemia    Right shoulder pain    Acute blood loss anemia    Atrial fibrillation    DM (diabetes mellitus), type 2    Anticoagulated on Coumadin    Dementia    CKD (chronic kidney disease), stage III    S/P hip replacement, right      Patient Active Problem List   Diagnosis Code   • Severe sepsis A41.9, R65.20   • Acute on chronic renal insufficiency N28.9, N18.9   • Hypernatremia E87.0   • Dehydration E86.0   • Atrial fibrillation I48.91   • Nonsustained ventricular tachycardia I47.2   • Dementia F03.90   • Closed fracture of right hip S72.001A   • CAD (coronary artery disease) I25.10   • Hypertension I10   • COPD (chronic obstructive pulmonary disease) J44.9   • DM (diabetes mellitus), type 2 E11.9   • Dementia F03.90   • Atrial fibrillation I48.91   • History of DVT (deep vein thrombosis) Z86.718   • DNR (do not resuscitate) Z66   • Anticoagulated on Coumadin Z51.81, Z79.01   • Iron deficiency anemia D50.9   • CKD (chronic kidney disease), stage III N18.3   • Acute GI bleeding K92.2   • Hypokalemia E87.6   • S/P hip replacement, right Z96.641   • Right shoulder pain M25.511   • Acute blood loss anemia D62       Plan:    Palliative measures only. He appears comfortable.    Zacarias Kulkarni MD  11/3/2017  12:38 PM

## 2017-11-03 NOTE — PLAN OF CARE
Problem: Patient Care Overview (Adult)  Goal: Plan of Care Review  Outcome: Ongoing (interventions implemented as appropriate)    11/02/17 2224 11/03/17 0633   Coping/Psychosocial Response Interventions   Plan Of Care Reviewed With patient --    Patient Care Overview   Progress --  declining   Outcome Evaluation   Outcome Summary/Follow up Plan --  Pt rested well. Medicated prior to Q4 turns. Continue comfort care.        Goal: Adult Individualization and Mutuality  Outcome: Ongoing (interventions implemented as appropriate)  Goal: Discharge Needs Assessment  Outcome: Ongoing (interventions implemented as appropriate)    Problem: Fall Risk (Adult)  Goal: Absence of Falls  Outcome: Ongoing (interventions implemented as appropriate)    Problem: Pressure Ulcer Risk (Dagoberto Scale) (Adult,Obstetrics,Pediatric)  Goal: Skin Integrity  Outcome: Ongoing (interventions implemented as appropriate)    Problem: Dying Patient, Actively (Adult)  Goal: Comfort/Pain Control  Outcome: Ongoing (interventions implemented as appropriate)  Goal: Dying Process, Peace and Dignity  Outcome: Ongoing (interventions implemented as appropriate)

## 2017-11-03 NOTE — PROGRESS NOTES
Discharge Planning Assessment  [facility]     Patient Name: Darrian Davis  MRN: [id number]  Today's Date: 11/2/2017    Admit Date: 10/28/2017          Discharge Needs Assessment     None            Discharge Plan       11/02/17 2047    Case Management/Social Work Plan    Additional Comments The patient transferred to [facility] from [address] [place] [address] on 11/2/17 @ 15:27. The patient is palliative. CCP will follow for any needs that may arise. CARMELITA Zeng RN, CCP.        Discharge Placement     No information found                Demographic Summary     None            Functional Status     None            Psychosocial     None            Abuse/Neglect     None            Legal     None            Substance Abuse     None            Patient Forms     None          Lisha Zeng, RN

## 2017-11-03 NOTE — PLAN OF CARE
Problem: Patient Care Overview (Adult)  Goal: Plan of Care Review  Outcome: Ongoing (interventions implemented as appropriate)    11/03/17 1803   Coping/Psychosocial Response Interventions   Plan Of Care Reviewed With patient   Patient Care Overview   Progress declining   Outcome Evaluation   Outcome Summary/Follow up Plan patient premedicated prior to turns. patient has been only responsive to pain. daughter at bedside this afternoon. woodson placed for for retention and comfort. will continue to monitor patient and treat accroding to orders.         Problem: Fall Risk (Adult)  Goal: Absence of Falls  Outcome: Ongoing (interventions implemented as appropriate)    Problem: Pressure Ulcer Risk (Dagoberto Scale) (Adult,Obstetrics,Pediatric)  Goal: Skin Integrity  Outcome: Ongoing (interventions implemented as appropriate)    Problem: Dying Patient, Actively (Adult)  Goal: Comfort/Pain Control  Outcome: Ongoing (interventions implemented as appropriate)  Goal: Dying Process, Peace and Dignity  Outcome: Ongoing (interventions implemented as appropriate)

## 2017-11-04 NOTE — CONSULTS
Met with family and explained services/scattered bed. Consents signed for 11/4/17 for scattered bed. Per Dr. Kulkarni agreeable to DC and readmit to scattered bed today. Thank you for the referral. Please call with any questions or concerns, Allie Rossi [phone].

## 2017-11-04 NOTE — PLAN OF CARE
Problem: Patient Care Overview (Adult)  Goal: Plan of Care Review  Outcome: Ongoing (interventions implemented as appropriate)    11/03/17 1809 11/03/17 2329 11/04/17 0530   Coping/Psychosocial Response Interventions   Plan Of Care Reviewed With --  patient --    Patient Care Overview   Progress declining --  --    Outcome Evaluation   Outcome Summary/Follow up Plan --  --  Pt rested well. Medicated prior to Q4 turns. Continue comfort care.        Goal: Adult Individualization and Mutuality  Outcome: Ongoing (interventions implemented as appropriate)  Goal: Discharge Needs Assessment  Outcome: Ongoing (interventions implemented as appropriate)    Problem: Fall Risk (Adult)  Goal: Absence of Falls  Outcome: Ongoing (interventions implemented as appropriate)    Problem: Pressure Ulcer Risk (Dagoberto Scale) (Adult,Obstetrics,Pediatric)  Goal: Skin Integrity  Outcome: Ongoing (interventions implemented as appropriate)    Problem: Dying Patient, Actively (Adult)  Goal: Comfort/Pain Control  Outcome: Ongoing (interventions implemented as appropriate)  Goal: Dying Process, Peace and Dignity  Outcome: Ongoing (interventions implemented as appropriate)

## 2017-11-04 NOTE — PLAN OF CARE
Problem: Patient Care Overview (Adult)  Goal: Plan of Care Review  Outcome: Ongoing (interventions implemented as appropriate)    11/04/17 1710   Coping/Psychosocial Response Interventions   Plan Of Care Reviewed With patient   Patient Care Overview   Progress declining   Outcome Evaluation   Outcome Summary/Follow up Plan Pt resting comfortably. Premedicated prior to repositioning. [facility] met with daughter today. Dr. Kulkarni notified. Will continue to monitor.       Goal: Adult Individualization and Mutuality  Outcome: Ongoing (interventions implemented as appropriate)  Goal: Discharge Needs Assessment  Outcome: Ongoing (interventions implemented as appropriate)    Problem: Fall Risk (Adult)  Goal: Absence of Falls  Outcome: Ongoing (interventions implemented as appropriate)    Problem: Pressure Ulcer Risk (Dagoberto Scale) (Adult,Obstetrics,Pediatric)  Goal: Skin Integrity  Outcome: Ongoing (interventions implemented as appropriate)    Problem: Dying Patient, Actively (Adult)  Goal: Comfort/Pain Control  Outcome: Ongoing (interventions implemented as appropriate)  Goal: Dying Process, Peace and Dignity  Outcome: Ongoing (interventions implemented as appropriate)

## 2017-11-04 NOTE — PROGRESS NOTES
"DAILY PROGRESS NOTE  [facility], [facility]      Patient Identification:  Name: Darrian Davis  Age: 77 y.o.  Sex: male  :  1940  MRN: [id number]         Primary Care Physician: Narciso Ma MD    Subjective:  Interval History:    Patient is comfortable.  On palliative care      Objective:    Scheduled Meds:     Continuous Infusions:       Vital signs in last 24 hours:  Temp:  [97.6 °F (36.4 °C)-97.8 °F (36.6 °C)] 97.6 °F (36.4 °C)  Heart Rate:  [81-95] 95  Resp:  [16] 16  BP: ()/(58-78) 118/78    tMax 24 hrs: Temp (24hrs), Av.7 °F (36.5 °C), Min:97.6 °F (36.4 °C), Max:97.8 °F (36.6 °C)      Intake/Output:    Intake/Output Summary (Last 24 hours) at 17 1030  Last data filed at 17 0312   Gross per 24 hour   Intake                0 ml   Output              325 ml   Net             -325 ml       Exam:    /78 (BP Location: Right arm, Patient Position: Lying)  Pulse 95  Temp 97.6 °F (36.4 °C) (Oral)   Resp 16  Ht 72.01\" (182.9 cm)  Wt 182 lb 1.6 oz (82.6 kg)  SpO2 95%  BMI 24.69 kg/m2    General: Lethargic.  appears stated age. In no acute distress.  Head: Normocephalic, without obvious abnormality, atraumatic  Neck: Supple, symmetrical; trachea midline, without  adenopathy;              Thyroid without  enlargement/tenderness/nodules;              no carotid bruit or JVD  Cardiovascular: irregular irregular.                              Exam reveals no gallop and no friction rub. No murmur heard  Pulmonary: Clear to auscultation bilaterally, respirations unlabored.                         No rhonchi, wheezing or rales.   Abdominal: Soft, non-tender, bowel sounds active all four quadrants;                       no masses,  hepatomegaly, or  splenomegaly.   Extremities: Normal, atraumatic; no cyanosis or edema. Able to move foot, all digits; reluctant to move right leg due to pain at the hip.    Pulses:        2 + symmetric all " extremities  Neurological: Somnolent.                          CNII-XII intact, normal strength, sensation intact throughout  Skin:  Smooth, without lesions, rash, or wounds. Warm, dry and intact.         Assessment:      Principal Problem:    Acute GI bleeding  Active Problems:    Acute on chronic renal insufficiency    Hypernatremia    Dehydration    Hypokalemia    Right shoulder pain    Acute blood loss anemia    Atrial fibrillation    DM (diabetes mellitus), type 2    Anticoagulated on Coumadin    Dementia    CKD (chronic kidney disease), stage III    S/P hip replacement, right      Patient Active Problem List   Diagnosis Code   • Severe sepsis A41.9, R65.20   • Acute on chronic renal insufficiency N28.9, N18.9   • Hypernatremia E87.0   • Dehydration E86.0   • Atrial fibrillation I48.91   • Nonsustained ventricular tachycardia I47.2   • Dementia F03.90   • Closed fracture of right hip S72.001A   • CAD (coronary artery disease) I25.10   • Hypertension I10   • COPD (chronic obstructive pulmonary disease) J44.9   • DM (diabetes mellitus), type 2 E11.9   • Dementia F03.90   • Atrial fibrillation I48.91   • History of DVT (deep vein thrombosis) Z86.718   • DNR (do not resuscitate) Z66   • Anticoagulated on Coumadin Z51.81, Z79.01   • Iron deficiency anemia D50.9   • CKD (chronic kidney disease), stage III N18.3   • Acute GI bleeding K92.2   • Hypokalemia E87.6   • S/P hip replacement, right Z96.641   • Right shoulder pain M25.511   • Acute blood loss anemia D62       Plan:    Palliative measures only. He appears comfortable.  We'll consult hospice for hospice scattered bed.      Zacarias Kulkarni MD  11/4/2017  10:30 AM

## 2017-11-05 PROBLEM — Z51.5 HOSPICE CARE: Status: ACTIVE | Noted: 2017-01-01

## 2017-11-05 NOTE — PROGRESS NOTES
[facility] Visit Report    Darrian Davis  [id number]  11/5/2017    [facility] Dx: Yes      Reason for [facility] Admission: Colon CA with lung mets      Symptom  Management: Pain/agitation      Nursing/Medication Recommendations: No recommendations at this time      Psychosocial Issues and Recommendations: Provide support to patient and family      Spiritual Concerns and Recommendations: None at present      [facility] Discharge Plans:  None, continue to monitor for rapid decline and patient receiving IV medications for symptom management and is meeting criteria for GIP as [facility].      Review of Visit: Close monitoring for safety/falls, symptom management of pain/agitation, comfort care for rapidly declining patient. Patient unresponsive, color slightly flushed, bilateral arms swollen and weeping, Right hip incision intact. 02 sat 84%. No family present. Discussed care needs with staff ANTONIA Sanchez and patient appears comfortable and has received IV Dilaudid 1mg x 2 doses and IV Robinul 0.4mg x 2 doses since midnight. Will continue to see daily to assess needs, monitor status and offer support.        Juana Carter RN  [facility] Visit Nurse

## 2017-11-05 NOTE — PLAN OF CARE
Problem: Patient Care Overview (Adult)  Goal: Plan of Care Review  Outcome: Ongoing (interventions implemented as appropriate)    11/04/17 1710 11/04/17 2009 11/05/17 0537   Coping/Psychosocial Response Interventions   Plan Of Care Reviewed With --  patient --    Patient Care Overview   Progress declining --  --    Outcome Evaluation   Outcome Summary/Follow up Plan --  --  Pt's IV went bad at the start of the shift. Pt became very restless and combative when waiting for IV therapy to start another site. IV team had a very difficult time gaining IV access. Pt settled as soon as comfort meds were able to be given. Pt is a prime candidate for a PICC. Pt to be flipped to [facility] scattered bed today. Continue comfort care.        Goal: Adult Individualization and Mutuality  Outcome: Ongoing (interventions implemented as appropriate)  Goal: Discharge Needs Assessment  Outcome: Ongoing (interventions implemented as appropriate)    Problem: Fall Risk (Adult)  Goal: Absence of Falls  Outcome: Ongoing (interventions implemented as appropriate)    Problem: Pressure Ulcer Risk (Dagoberto Scale) (Adult,Obstetrics,Pediatric)  Goal: Skin Integrity  Outcome: Ongoing (interventions implemented as appropriate)    Problem: Dying Patient, Actively (Adult)  Goal: Comfort/Pain Control  Outcome: Ongoing (interventions implemented as appropriate)  Goal: Dying Process, Peace and Dignity  Outcome: Ongoing (interventions implemented as appropriate)

## 2017-11-05 NOTE — H&P
HISTORY AND PHYSICAL   [facility], [facility]      2017    Patient Identification:    Name: Darrian Davis  Age: 77 y.o.  Sex: male  :  1940  MRN: [id number]                       Primary Care Physician: Narciso Ma MD    Chief Complaint:      Hospice care    History of Present Illness:     Ms. Davis  is a 77-year-old gentleman with history of severe dementia, previous CVA, coronary artery disease, hypertension, COPD, DVT, diabetes mellitus, atrial fibrillation, on Coumadin.  Patient was initially admitted to the hospital from  to  due to right hip fracture, he underwent hip replacement on the October 10.  Apparently he was doing okay and was discharged to nursing home on .  He was very confused at the nursing home, however he was not aggressive, he was able to work with physical therapy.  The day of admission he was complaining of chest pain in the mid as well as the right side, very intense, as well as shortness of breath, reason for which he was sent to the emergency room.  In emergency room, patient was found to have black stools, heme-positive, his hemoglobin was 6.1, his INR was 2.26, his creatinine was 1.5, EKG and troponin were negative for acute current symptoms.  However due to this GI bleeding and severe anemia patient was admitted to the hospital for further management.      Upon admission, he was given blood, IV fluids. He remained very confused. Further work up revealed signs of colon cancer with suspicious metastatic LN. Patient's POA was explained regarding the options of treatment or not treating and do comfort care. She decided not to treat the patient, she made patient DNR, and agreed to change his care to palliative care. So patient code status was changed to DNR/AND and his was changed to palliative care. Hospice was consulted and  approve patient to be taking care under hospice scattered bed.  So patient was discharged and readmitted  as hospice scattered bed.  Patient will continue with palliative care orders.  This morning he is very comfortable.      Past Medical History:  Past Medical History:   Diagnosis Date   • Anxiety    • Arthritis    • Atrial fibrillation    • CAD (coronary artery disease)    • Confusion    • COPD (chronic obstructive pulmonary disease)    • Dementia    • Depression    • DM (diabetes mellitus), type 2    • DVT (deep venous thrombosis)    • History of transfusion    • Hypertension    • Kidney stone    • Myocardial infarction    • PE (pulmonary thromboembolism)    • Prostate cancer    • Skin cancer    • Stroke      Past Surgical History:  Past Surgical History:   Procedure Laterality Date   • BACK SURGERY     • COLONOSCOPY     • HIP SPACER INSERTION WITH ANTIBIOTIC CEMENT Right 10/10/2017    Procedure: HIP BIPOLAR CEMENTED;  Surgeon: Greg Dickerson MD;  Location: [facility];  Service:    • HIP SURGERY     • PROSTATE SURGERY        Home Meds:  Prescriptions Prior to Admission   Medication Sig Dispense Refill Last Dose   • aspirin 81 MG tablet Take 81 mg by mouth Daily.   10/28/2017 at 0830   • Divalproex Sodium (DEPAKOTE SPRINKLE) 125 MG capsule Take 250 mg by mouth 3 (Three) Times a Day.   10/28/2017 at 0900   • ferrous sulfate (IRON SUPPLEMENT) 325 (65 FE) MG tablet Take 1 tablet by mouth 2 (Two) Times a Day. 60 tablet 2 10/28/2017 at 0900   • gabapentin (NEURONTIN) 300 MG capsule Take 1 capsule by mouth 3 (Three) Times a Day. 90 capsule 0 10/28/2017 at 1200   • HYDROcodone-acetaminophen (NORCO) 5-325 MG per tablet Take 1 tablet by mouth Every 4 (Four) Hours As Needed for Moderate Pain . 20 tablet 0 Past Week at Unknown time   • metoprolol tartrate (LOPRESSOR) 25 MG tablet Take 1 tablet by mouth 2 (Two) Times a Day. 60 tablet 0 10/28/2017 at 0900   • Multiple Vitamins-Minerals (MULTIVITAMIN ADULT PO) Take  by mouth Daily.   10/28/2017 at 0900   • nitroglycerin (NITROSTAT) 0.4 MG SL tablet Place 0.4 mg under  the tongue Every 5 (Five) Minutes As Needed for Chest Pain. Take no more than 3 doses in 15 minutes.   Unknown at Unknown time   • Omega-3 Fatty Acids (FISH OIL) 1000 MG capsule capsule Take 1,000 mg by mouth Daily With Breakfast.   10/28/2017 at 0900   • warfarin (COUMADIN) 5 MG tablet Take 2.5 mg by mouth Daily.   10/27/2017 at 2100       Allergies:  Allergies   Allergen Reactions   • Bee Venom    • Iodine Hives   • Latex    • Propoxyphene Hives     Immunizations:    There is no immunization history on file for this patient.  Social History:   Social History     Social History Narrative     Social History   Substance Use Topics   • Smoking status: Former Smoker     Packs/day: 2.00     Years: 40.00     Types: Cigarettes   • Smokeless tobacco: Never Used   • Alcohol use No     Family History:  Family History   Problem Relation Age of Onset   • Family history unknown: Yes        Review of Systems  See history of present illness and past medical history.    Hospice care      Objective:    Exam:    tMax 24 hrs: Temp (24hrs), Av.1 °F (36.7 °C), Min:98 °F (36.7 °C), Max:98.2 °F (36.8 °C)    Vitals Ranges:   Temp:  [98 °F (36.7 °C)-98.2 °F (36.8 °C)] 98 °F (36.7 °C)  Heart Rate:  [] 82  Resp:  [16-18] 16  BP: (119-142)/(78-83) 119/78        General: Lethargic.  appears stated age.   Head: Normocephalic, without obvious abnormality, atraumatic  Neck: Supple, symmetrical; trachea midline, without  adenopathy;              Thyroid without  enlargement/tenderness/nodules;              no carotid bruit or JVD  Cardiovascular: irregular irregular.                              Exam reveals no gallop and no friction rub. No murmur heard  Pulmonary: Clear to auscultation bilaterally, respirations unlabored.                         No rhonchi, wheezing or rales.   Abdominal: Soft, non-tender, bowel sounds active all four quadrants;                       no masses,  hepatomegaly, or  splenomegaly.   Extremities: Normal,  atraumatic; no cyanosis or edema.  Pulses:        2 + symmetric all extremities  Neurological: Somnolent.                          CNII-XII intact, normal strength, sensation intact throughout  Skin:  Smooth, without lesions, rash, or wounds. Warm, dry and intact.     Data Review:      Results from last 7 days  Lab Units 11/02/17  0754 11/01/17 1956 11/01/17 0657   WBC 10*3/mm3 4.35* 6.04 4.82   HEMOGLOBIN g/dL 7.7* 8.5* 7.8*   HEMATOCRIT % 26.4* 31.5* 27.3*   PLATELETS 10*3/mm3 172 193 178         Results from last 7 days  Lab Units 11/02/17 0754 11/01/17 0657 10/31/17  0643 10/30/17  0806   SODIUM mmol/L 147* 143 144 147*   POTASSIUM mmol/L 4.5 4.1 4.0 4.1   CHLORIDE mmol/L 117* 114* 115* 117*   CO2 mmol/L 19.8* 18.5* 17.8* 19.7*   BUN mg/dL 14 15 16 20   CREATININE mg/dL 1.24 1.24 1.09 1.31*   CALCIUM mg/dL 9.2 9.2 8.5* 9.3   BILIRUBIN mg/dL  --   --   --  0.3   ALK PHOS U/L  --   --   --  87   ALT (SGPT) U/L  --   --   --  7   AST (SGOT) U/L  --   --   --  9   GLUCOSE mg/dL 74 86 96 89       Results from last 7 days  Lab Units 11/02/17 0754 11/01/17  0657 10/30/17  0806   INR  2.67* 2.80* 2.63*         Lab Results  Lab Value Date/Time   TROPONINT 0.016 10/31/2017 0643   TROPONINT 0.026 10/28/2017 1423   TROPONINT <0.010 10/08/2017 1034   TROPONINT 0.025 08/14/2017 0234   TROPONINT 0.019 08/13/2017 0601   TROPONINT 0.024 08/12/2017 1144   TROPONINT 0.028 07/22/2017 2209   TROPONINT <0.01 06/01/2014 0819   TROPONINT <0.01 06/01/2014 0438   TROPONINT <0.01 05/29/2014 1454   TROPONINT <0.01 05/29/2014 0437   TROPONINT <0.01 05/28/2014 2018        Imaging Results (all)     None          Assessment:    Active Problems:    Hospice care    Acute GI bleeding    Acute on chronic renal insufficiency    Hypernatremia    Dehydration    Hypokalemia    Right shoulder pain    Acute blood loss anemia    Atrial fibrillation    DM (diabetes mellitus), type 2    Anticoagulated on Coumadin    Dementia    CKD (chronic kidney  disease), stage III    S/P hip replacement, right    Patient Active Problem List   Diagnosis Code   • Severe sepsis A41.9, R65.20   • Acute on chronic renal insufficiency N28.9, N18.9   • Hypernatremia E87.0   • Dehydration E86.0   • Atrial fibrillation I48.91   • Nonsustained ventricular tachycardia I47.2   • Dementia F03.90   • Closed fracture of right hip S72.001A   • CAD (coronary artery disease) I25.10   • Hypertension I10   • COPD (chronic obstructive pulmonary disease) J44.9   • DM (diabetes mellitus), type 2 E11.9   • Dementia F03.90   • Atrial fibrillation I48.91   • History of DVT (deep vein thrombosis) Z86.718   • DNR (do not resuscitate) Z66   • Anticoagulated on Coumadin Z51.81, Z79.01   • Iron deficiency anemia D50.9   • CKD (chronic kidney disease), stage III N18.3   • Acute GI bleeding K92.2   • Hypokalemia E87.6   • S/P hip replacement, right Z96.641   • Right shoulder pain M25.511   • Acute blood loss anemia D62   • Hospice care Z51.5       Plan:    Inpatient admission as HSB  Palliative care/comfort measures orders only  Patient is comfortable at this time.        Zacarias Kulkarni MD  11/5/2017  9:37 AM

## 2017-11-05 NOTE — DISCHARGE SUMMARY
PHYSICIAN DISCHARGE SUMMARY  [facility], [facility]    Patient Identification:    Name: Darrian Davis  Age: 77 y.o.  Sex: male  :  1940  MRN: [id number]    Primary Care Physician: Narciso Ma MD    Admit date: 10/28/2017    Discharge date and time:2017    Discharged Condition: poor    Discharge Diagnoses:  Principal Problem:    Acute GI bleeding  Active Problems:    Acute on chronic renal insufficiency    Hypernatremia    Dehydration    Hypokalemia    Right shoulder pain    Acute blood loss anemia    Atrial fibrillation    DM (diabetes mellitus), type 2    Anticoagulated on Coumadin    Dementia    CKD (chronic kidney disease), stage III    S/P hip replacement, right    Patient Active Problem List   Diagnosis Code   • Severe sepsis A41.9, R65.20   • Acute on chronic renal insufficiency N28.9, N18.9   • Hypernatremia E87.0   • Dehydration E86.0   • Atrial fibrillation I48.91   • Nonsustained ventricular tachycardia I47.2   • Dementia F03.90   • Closed fracture of right hip S72.001A   • CAD (coronary artery disease) I25.10   • Hypertension I10   • COPD (chronic obstructive pulmonary disease) J44.9   • DM (diabetes mellitus), type 2 E11.9   • Dementia F03.90   • Atrial fibrillation I48.91   • History of DVT (deep vein thrombosis) Z86.718   • DNR (do not resuscitate) Z66   • Anticoagulated on Coumadin Z51.81, Z79.01   • Iron deficiency anemia D50.9   • CKD (chronic kidney disease), stage III N18.3   • Acute GI bleeding K92.2   • Hypokalemia E87.6   • S/P hip replacement, right Z96.641   • Right shoulder pain M25.511   • Acute blood loss anemia D62          Hospital Course: Darrian Davis  is a 77-year-old gentleman with history of severe dementia, previous CVA, coronary artery disease, hypertension, COPD, DVT, diabetes mellitus, atrial fibrillation, on Coumadin.  Patient was initially admitted to the hospital from  to  due to right hip fracture,  he underwent hip replacement on the October 10.  Apparently he was doing okay and was discharged to nursing home on October 18.  He was very confused at the nursing home, however he was not aggressive, he was able to work with physical therapy.  The day of admission he was complaining of chest pain in the mid as well as the right side, very intense, as well as shortness of breath, reason for which he was sent to the emergency room.  In emergency room, patient was found to have black stools, heme-positive, his hemoglobin was 6.1, his INR was 2.26, his creatinine was 1.5, EKG and troponin were negative for acute current symptoms.  However due to this GI bleeding and severe anemia patient was admitted to the hospital for further management.     Upon admission, he was given blood, IV fluids. He remained very confused. Further work up revealed signs of colon cancer with suspicious metastatic LN. Patient's POA was explained regarding the options of treatment vs not treating and do comfort care. She decided not to treat the patient, she made patient DNR, and agreed to change his care to palliative care. She also changed his code status to DNR/AND. Today Hospice met with POA, and  Patient qualified for HS. Now he is being discharged and readmitted to hospice as HSB.    PMHX:   Past Medical History:   Diagnosis Date   • Anxiety    • Arthritis    • Atrial fibrillation    • CAD (coronary artery disease)    • Confusion    • COPD (chronic obstructive pulmonary disease)    • Dementia    • Depression    • DM (diabetes mellitus), type 2    • DVT (deep venous thrombosis)    • History of transfusion    • Hypertension    • Kidney stone    • Myocardial infarction    • PE (pulmonary thromboembolism)    • Prostate cancer    • Skin cancer    • Stroke      PSHX:   Past Surgical History:   Procedure Laterality Date   • BACK SURGERY     • COLONOSCOPY     • HIP SPACER INSERTION WITH ANTIBIOTIC CEMENT Right 10/10/2017    Procedure: HIP BIPOLAR  "CEMENTED;  Surgeon: Greg Dickerson MD;  Location: [facility] OR;  Service:    • HIP SURGERY     • PROSTATE SURGERY             Consults:     Consults     Date and Time Order Name Status Description    10/28/2017 1834 Inpatient Consult to Gastroenterology Completed     10/28/2017 1612 Family Medicine Consult Completed     10/13/2017 1606 Inpatient Consult to Psychiatrist Completed     10/8/2017 1012 Inpatient Consult to Cardiology Completed     10/8/2017 0949 Inpatient Consult to Orthopedic Surgery Completed     10/8/2017 0651 Ortho (on-call MD unless specified) Completed     10/8/2017 0651 [facility] (on-call MD unless specified) Completed           Discharge Exam:     /83 (BP Location: Right arm, Patient Position: Lying)  Pulse 118  Temp 98.2 °F (36.8 °C) (Oral)   Resp 18  Ht 72.01\" (182.9 cm)  Wt 182 lb 1.6 oz (82.6 kg)  SpO2 94%  BMI 24.69 kg/m2    General: Lethargic.  appears stated age. In no acute distress.  Head: Normocephalic, without obvious abnormality, atraumatic  Neck: Supple, symmetrical; trachea midline, without  adenopathy;              Thyroid without  enlargement/tenderness/nodules;              no carotid bruit or JVD  Cardiovascular: irregular irregular.                              Exam reveals no gallop and no friction rub. No murmur heard  Pulmonary: Clear to auscultation bilaterally, respirations unlabored.                         No rhonchi, wheezing or rales.   Abdominal: Soft, non-tender, bowel sounds active all four quadrants;                       no masses,  hepatomegaly, or  splenomegaly.   Extremities: Normal, atraumatic; no cyanosis or edema.   Pulses:        2 + symmetric all extremities  Neurological: Somnolent.                          CNII-XII intact, normal strength, sensation intact throughout  Skin:  Smooth, without lesions, rash, or wounds. Warm, dry and intact.     Data Review:          Results from last 7 days  Lab Units 11/02/17  0754 11/01/17 1956 " 11/01/17  0657   WBC 10*3/mm3 4.35* 6.04 4.82   HEMOGLOBIN g/dL 7.7* 8.5* 7.8*   HEMATOCRIT % 26.4* 31.5* 27.3*   PLATELETS 10*3/mm3 172 193 178         Results from last 7 days  Lab Units 11/02/17  0754 11/01/17  0657 10/31/17  0643 10/30/17  0806   SODIUM mmol/L 147* 143 144 147*   POTASSIUM mmol/L 4.5 4.1 4.0 4.1   CHLORIDE mmol/L 117* 114* 115* 117*   CO2 mmol/L 19.8* 18.5* 17.8* 19.7*   BUN mg/dL 14 15 16 20   CREATININE mg/dL 1.24 1.24 1.09 1.31*   CALCIUM mg/dL 9.2 9.2 8.5* 9.3   BILIRUBIN mg/dL  --   --   --  0.3   ALK PHOS U/L  --   --   --  87   ALT (SGPT) U/L  --   --   --  7   AST (SGOT) U/L  --   --   --  9   GLUCOSE mg/dL 74 86 96 89       Results from last 7 days  Lab Units 11/02/17  0754 11/01/17  0657 10/30/17  0806   INR  2.67* 2.80* 2.63*         Lab Results  Lab Value Date/Time   TROPONINT 0.016 10/31/2017 0643   TROPONINT 0.026 10/28/2017 1423   TROPONINT <0.010 10/08/2017 1034   TROPONINT 0.025 08/14/2017 0234   TROPONINT 0.019 08/13/2017 0601   TROPONINT 0.024 08/12/2017 1144   TROPONINT 0.028 07/22/2017 2209   TROPONINT <0.01 06/01/2014 0819   TROPONINT <0.01 06/01/2014 0438   TROPONINT <0.01 05/29/2014 1454   TROPONINT <0.01 05/29/2014 0437   TROPONINT <0.01 05/28/2014 2018       Microbiology Results (last 10 days)     ** No results found for the last 240 hours. **           Imaging Results (all)     Procedure Component Value Units Date/Time    XR Chest 1 View [[id number]] Collected:  10/28/17 1432     Updated:  10/28/17 1437    Narrative:       XR CHEST 1 VW-     HISTORY: Male who is 77 years-old,  short of breath     TECHNIQUE: Frontal view of the chest     COMPARISON: 10/08/2017     FINDINGS: Heart size is borderline. Aorta is tortuous. Slight prominence  of the vascular and interstitial markings is less than on the prior  exam. No focal pulmonary consolidation, pleural effusion, or  pneumothorax. No acute osseous process.       Impression:       No focal pulmonary consolidation.  Borderline heart size with  slight prominence of vascular and interstitial markings. Tortuous aorta.     This report was finalized on 10/28/2017 2:34 PM by Dr. Raj Jha MD.       XR Shoulder 2+ View Right [[id number]] Collected:  10/29/17 1449     Updated:  10/29/17 1453    Narrative:       RIGHT SHOULDER 2 VIEWS     HISTORY: 77-year-old male with right shoulder pain, COPD, diabetes,  history of DVT and hypertension     COMPARISON: 05/28/2014     FINDINGS:  1. Arthritic changes of the right shoulder with evidence of complete  rotator cuff tear.  2. No acute traumatic osseous abnormality.     This report was finalized on 10/29/2017 2:50 PM by Dr. Horacio Ang MD.       CT Abdomen Pelvis Stone Protocol [[id number]] Collected:  10/30/17 2027     Updated:  10/30/17 2048    Narrative:       CT ABDOMEN PELVIS STONE PROTOCOL-     CLINICAL HISTORY: Abdominal pain and distention     TECHNIQUE: Spiral CT images were acquired through the abdomen and pelvis  with oral contrast only and were reconstructed in 3 mm thick axial  slices.     Radiation dose reduction techniques were utilized, including automated  exposure control and exposure modulation based on body size.     COMPARISON: CT scan of the abdomen and pelvis dated 08/12/2017.     FINDINGS: The images are somewhat degraded due to beam hardening  artifact from the patient's arms that apparently could not be raised.  Images through the lung bases demonstrate small bilateral posteriorly  layering pleural effusions that are new. The effusion on the right is  slightly greater than on the left. There is a 1.5 cm diameter smoothly  marginated noncalcified pulmonary nodule in the inferior aspect of the  right lower lobe that has increased in size significantly since the  preceding CT scan dated 08/12/2017 where it measured only 6 mm in  greatest transverse diameter. Further evaluation with a follow-up CT  scan of the chest is suggested. The liver and spleen and  pancreas are  unremarkable. A small approximately 1.8 cm diameter left adrenal mass is  unchanged, and statistically is most likely an adenoma. The right  adrenal gland is unremarkable. A couple small gallstones are noted in  the lumen of the gallbladder. There is no bile duct dilatation. The  kidneys are atrophic and contain multiple bilateral renal masses most of  which are compatible with simple cysts. However, there is an  indeterminate lesion in the upper pole of the right kidney measuring up  to 1 cm in diameter that could be solid. This is seen on multiple  previous CT scans dating back to 02/21/2013 where it measured  approximately 3.3 cm in diameter. This indicates the lesion is either  benign or a very indolent renal cell carcinoma. There is diffuse  stranding of the intra-abdominal and pelvic fat new and is likely edema  due to mild anasarca. Small amount of ascites is evident along the dome  of the urinary bladder that is new. There is moderate localized  circumferential wall thickening within the right side of the colon  immediately superior to the cecum that is highly suspicious for colon  carcinoma. This produces moderately severe narrowing of the lumen of the  colon. There is no definite evidence of obstruction at this time. The  small bowel is not dilated. There are several mildly enlarged lymph  nodes medial to the colon neoplasm consistent with localized metastatic  disease. Multiple mild to moderately enlarged lymph nodes are also  present in the retroperitoneum consistent with metastatic disease. The  lymphadenopathy is new since the previous CT dated 08/12/2017. The  stomach is unremarkable.       Impression:       No definite acute process is identified within the abdomen  and pelvis. There is moderate circumferential wall thickening involving  the ascending colon producing moderate luminal narrowing. The findings  are highly suspicious for colon carcinoma. There are several mildly  enlarged  lymph nodes medial to the neoplasm consistent with localized  metastatic disease. Multiple mild to moderately enlarged lymph nodes are  also present in the retroperitoneum consistent with metastatic disease.  The lymphadenopathy is new since the previous CT dated 08/12/2017. There  is no evidence of visceral metastatic disease in the abdomen. However,  there is a nodule at the right lung base that shows significant increase  in size since the preceding CT scan that could be a lung metastasis.  There are multiple bilateral renal masses that are primarily consistent  with simple cysts. However there is a possible solid mass in the upper  pole right kidney measuring 4.1 cm in diameter that show slight increase  in size when compared to multiple previous CTs dating back to  03/21/2013. This could be solid. Small stable left adrenal mass.  Cholelithiasis. Small bilateral pleural effusions, greater in size on  the right than the left.     This report was finalized on 10/30/2017 8:45 PM by Dr. Wilbert Tam MD.               Disposition:    [facility]    Signed:  Zacarias Kulkarni MD  11/4/2017

## 2017-11-06 NOTE — PLAN OF CARE
Problem: Patient Care Overview (Adult)  Goal: Plan of Care Review  Outcome: Ongoing (interventions implemented as appropriate)    11/06/17 1530   Coping/Psychosocial Response Interventions   Plan Of Care Reviewed With patient   Patient Care Overview   Progress declining   Outcome Evaluation   Outcome Summary/Follow up Plan Pt. continued with palliative care. He was premedicated for turns and agitation. He has dilaudid, ativan and robinul as needed. Will continue to monitor.       Goal: Adult Individualization and Mutuality  Outcome: Ongoing (interventions implemented as appropriate)  Goal: Discharge Needs Assessment  Outcome: Ongoing (interventions implemented as appropriate)    Problem: Dying Patient, Actively (Adult)  Goal: Comfort/Pain Control  Outcome: Ongoing (interventions implemented as appropriate)  Goal: Dying Process, Peace and Dignity  Outcome: Ongoing (interventions implemented as appropriate)    Problem: Fall Risk (Adult)  Goal: Absence of Falls  Outcome: Ongoing (interventions implemented as appropriate)    Problem: Pressure Ulcer Risk (Dagoberto Scale) (Adult,Obstetrics,Pediatric)  Goal: Skin Integrity  Outcome: Ongoing (interventions implemented as appropriate)

## 2017-11-06 NOTE — PLAN OF CARE
"Problem: Patient Care Overview (Adult)  Goal: Plan of Care Review  Outcome: Ongoing (interventions implemented as appropriate)    11/05/17 2050 11/06/17 0603   Coping/Psychosocial Response Interventions   Plan Of Care Reviewed With patient --    Patient Care Overview   Progress --  declining   Outcome Evaluation   Outcome Summary/Follow up Plan --  Pt rested well. Medicated prior to Q4 turns. Any gap in comfort meds and the pt becomes very restless. IV therapy suggested that a PICC be placed because pt is \"such a hard stick\". Spoke with Dr. Kulkarni about this and he said to have day shift check with him in AM. Continue comfort care.       Goal: Adult Individualization and Mutuality  Outcome: Ongoing (interventions implemented as appropriate)  Goal: Discharge Needs Assessment  Outcome: Ongoing (interventions implemented as appropriate)    Problem: Dying Patient, Actively (Adult)  Goal: Comfort/Pain Control  Outcome: Ongoing (interventions implemented as appropriate)  Goal: Dying Process, Peace and Dignity  Outcome: Ongoing (interventions implemented as appropriate)    Problem: Fall Risk (Adult)  Goal: Absence of Falls  Outcome: Ongoing (interventions implemented as appropriate)    Problem: Pressure Ulcer Risk (Dagoberto Scale) (Adult,Obstetrics,Pediatric)  Goal: Skin Integrity  Outcome: Ongoing (interventions implemented as appropriate)      "

## 2017-11-06 NOTE — PROGRESS NOTES
Case Management Discharge Note    Final Note: the patient was admitted to a [facility] bed on 11/4/17. CARMELITA Zeng RN, CCP.     Discharge Placement     No information found             Discharge Codes: 51  Hospice - medical facility

## 2017-11-06 NOTE — PROGRESS NOTES
"DAILY PROGRESS NOTE  [facility], [facility]      Patient Identification:  Name: Darrian Davis  Age: 77 y.o.  Sex: male  :  1940  MRN: [id number]         Primary Care Physician: Narciso Ma MD    Subjective:  Interval History:    Mr. Davis is sleeping heavily this morning. Nursing reports significant agitation at times and difficulty maintaining IV access.       Objective:    Scheduled Meds:     Continuous Infusions:       Vital signs in last 24 hours:  Temp:  [99.3 °F (37.4 °C)-99.8 °F (37.7 °C)] 99.3 °F (37.4 °C)  Heart Rate:  [111-120] 111  Resp:  [16-18] 18  BP: (126-154)/(72-81) 126/72    tMax 24 hrs: Temp (24hrs), Av.6 °F (37.6 °C), Min:99.3 °F (37.4 °C), Max:99.8 °F (37.7 °C)      Intake/Output:    Intake/Output Summary (Last 24 hours) at 17 1309  Last data filed at 17 0242   Gross per 24 hour   Intake                0 ml   Output              800 ml   Net             -800 ml       Exam:    /72 (BP Location: Left arm, Patient Position: Lying)  Pulse 111  Temp 99.3 °F (37.4 °C) (Oral)   Resp 18  Ht 72.01\" (182.9 cm)  SpO2 (!) 88%    General: Sleeping.  appears stated age. In no acute distress.  Head: Normocephalic, without obvious abnormality, atraumatic  Neck: Supple, symmetrical; trachea midline, without  adenopathy;              Thyroid without  enlargement/tenderness/nodules;              no carotid bruit or JVD  Cardiovascular: irregular irregular.                              Exam reveals no gallop and no friction rub. No murmur heard  Pulmonary: Clear to auscultation bilaterally, respirations unlabored.                         No rhonchi, wheezing or rales.   Abdominal: Soft, non-tender, bowel sounds active all four quadrants;                       no masses,  hepatomegaly, or  splenomegaly.   Extremities: Normal, atraumatic; no cyanosis or edema. Able to move foot, all digits; reluctant to move right leg due to pain at " the hip.    Pulses:        2 + symmetric all extremities  Neurological: Somnolent.                          CNII-XII intact, normal strength, sensation intact throughout  Skin:  Smooth, without lesions, rash, or wounds. Warm, dry and intact.         Assessment:      Active Problems:    Hospice care   Acute GI bleeding    Acute on chronic renal insufficiency    Hypernatremia    Dehydration    Hypokalemia    Right shoulder pain    Acute blood loss anemia    Atrial fibrillation    DM (diabetes mellitus), type 2    Dementia    CKD (chronic kidney disease), stage III    S/P hip replacement, right    Patient Active Problem List   Diagnosis Code   • Severe sepsis A41.9, R65.20   • Acute on chronic renal insufficiency N28.9, N18.9   • Hypernatremia E87.0   • Dehydration E86.0   • Atrial fibrillation I48.91   • Nonsustained ventricular tachycardia I47.2   • Dementia F03.90   • Closed fracture of right hip S72.001A   • CAD (coronary artery disease) I25.10   • Hypertension I10   • COPD (chronic obstructive pulmonary disease) J44.9   • DM (diabetes mellitus), type 2 E11.9   • Dementia F03.90   • Atrial fibrillation I48.91   • History of DVT (deep vein thrombosis) Z86.718   • DNR (do not resuscitate) Z66   • Anticoagulated on Coumadin Z51.81, Z79.01   • Iron deficiency anemia D50.9   • CKD (chronic kidney disease), stage III N18.3   • Acute GI bleeding K92.2   • Hypokalemia E87.6   • S/P hip replacement, right Z96.641   • Right shoulder pain M25.511   • Acute blood loss anemia D62   • Hospice care Z51.5       Plan:    Palliative measures only. He appears comfortable this morning.   Spoke with POA, all questions answered.    Zacarias Kulkarni MD  11/6/2017  1:09 PM

## 2017-11-06 NOTE — PROGRESS NOTES
[facility] Visit Report    Darrian Davis  [id number]  11/6/2017    Admission R/T [facility] Dx: yes    Reason for [facility] Admission: Colon ca with mets    Symptom  Management: Pain Control    Nursing/Medication Recommendations:    Psychosocial Issues and Recommendations:    Spiritual Concerns and Recommendations:    [facility] Discharge Plans:  incomplete; patient remains HSB and [facility] will round daily    Review of Visit (Include All Collaboration- including names of hospital and family involved during admission/visit):  [facility] collab with [facility] RN Norma, pt being medicated prior to turns; since midnight pt has received IV Dilaudid 1 mg x4, IV Robinul 0.4 mg x4, IV Ativan 2 mg x4; VS: T99.3, P111, R18, /72, O2=88%@RA; pt also has a Scopolamine patch.    Pt non-responsive, occas slight movement under covers; [facility] provided presence, later had prayer aloud at bedside, left card for family with date/ time/ ref to prayer.    [facility] notified RN of slight movements, called pt americo Diana, left detailed msg of visit and prayer.    [facility] to round daily to assess and offer support.        You Wright, [facility]

## 2017-11-06 NOTE — PLAN OF CARE
Problem: Patient Care Overview (Adult)  Goal: Plan of Care Review  Outcome: Ongoing (interventions implemented as appropriate)    11/05/17 1926   Coping/Psychosocial Response Interventions   Plan Of Care Reviewed With patient   Patient Care Overview   Progress no change   Outcome Evaluation   Outcome Summary/Follow up Plan Pt resting comfortably. Becoming more congested. Medicated with Dilaudid and Robinul prior to repositioning. Will continue to monitor.       Goal: Adult Individualization and Mutuality  Outcome: Ongoing (interventions implemented as appropriate)  Goal: Discharge Needs Assessment  Outcome: Ongoing (interventions implemented as appropriate)    Problem: Dying Patient, Actively (Adult)  Goal: Comfort/Pain Control  Outcome: Ongoing (interventions implemented as appropriate)  Goal: Dying Process, Peace and Dignity  Outcome: Ongoing (interventions implemented as appropriate)    Problem: Fall Risk (Adult)  Goal: Absence of Falls  Outcome: Ongoing (interventions implemented as appropriate)    Problem: Pressure Ulcer Risk (Dagoberto Scale) (Adult,Obstetrics,Pediatric)  Goal: Skin Integrity  Outcome: Ongoing (interventions implemented as appropriate)

## 2017-11-07 NOTE — PLAN OF CARE
Problem: Patient Care Overview (Adult)  Goal: Plan of Care Review  Outcome: Ongoing (interventions implemented as appropriate)    11/07/17 1803   Coping/Psychosocial Response Interventions   Plan Of Care Reviewed With patient;daughter   Patient Care Overview   Progress declining   Outcome Evaluation   Outcome Summary/Follow up Plan Maintained comfort care, Pre-medicated prior to turns/activity for comfort, requiring suctioning with turns, spoke to daughter via phone today with updates. Continue current plan of care.         Problem: Dying Patient, Actively (Adult)  Goal: Comfort/Pain Control  Outcome: Ongoing (interventions implemented as appropriate)  Goal: Dying Process, Peace and Dignity  Outcome: Ongoing (interventions implemented as appropriate)    Problem: Fall Risk (Adult)  Goal: Absence of Falls  Outcome: Ongoing (interventions implemented as appropriate)    Problem: Pressure Ulcer Risk (Dagoberto Scale) (Adult,Obstetrics,Pediatric)  Goal: Skin Integrity  Outcome: Ongoing (interventions implemented as appropriate)

## 2017-11-07 NOTE — PLAN OF CARE
Problem: Patient Care Overview (Adult)  Goal: Plan of Care Review  Outcome: Ongoing (interventions implemented as appropriate)    11/06/17 1530 11/06/17 2350 11/07/17 0433   Coping/Psychosocial Response Interventions   Plan Of Care Reviewed With --  patient --    Patient Care Overview   Progress declining --  --    Outcome Evaluation   Outcome Summary/Follow up Plan --  --  Pt rested well. Medicated prior to Q4 turns. No family at bedside. Continue comfort care.        Goal: Adult Individualization and Mutuality  Outcome: Ongoing (interventions implemented as appropriate)  Goal: Discharge Needs Assessment  Outcome: Ongoing (interventions implemented as appropriate)    Problem: Dying Patient, Actively (Adult)  Goal: Comfort/Pain Control  Outcome: Ongoing (interventions implemented as appropriate)  Goal: Dying Process, Peace and Dignity  Outcome: Ongoing (interventions implemented as appropriate)    Problem: Fall Risk (Adult)  Goal: Absence of Falls  Outcome: Ongoing (interventions implemented as appropriate)    Problem: Pressure Ulcer Risk (Dagoberto Scale) (Adult,Obstetrics,Pediatric)  Goal: Skin Integrity  Outcome: Ongoing (interventions implemented as appropriate)

## 2017-11-08 NOTE — PROGRESS NOTES
Case Management Discharge Note    Final Note: The patient  on 17 @ 00:20. CARMELITA Zeng Rn, CCP.    Discharge Placement     No information found             Discharge Codes: Hospice discharge status code 41  in a medical facility, such as hospital, SNF, ICF or free-standing hospice

## 2017-11-25 NOTE — DISCHARGE SUMMARY
PHYSICIAN DISCHARGE SUMMARY  [facility], [facility]    Patient Identification:    Name: Darrian Davis  Age: 77 y.o.  Sex: male  :  1940  MRN: [id number]    Primary Care Physician: Narciso Ma MD    Admit date: 2017    Discharge date and time:2017    Discharged Condition:     Discharge Diagnoses:  Active Problems:    Hospice care    Acute on chronic renal insufficiency    Hypernatremia    Dehydration    Hypokalemia    Right shoulder pain    Acute blood loss anemia    Atrial fibrillation    DM (diabetes mellitus), type 2    Dementia    CKD (chronic kidney disease), stage III    S/P hip replacement, right      Patient Active Problem List   Diagnosis Code   • Severe sepsis A41.9, R65.20   • Acute on chronic renal insufficiency N28.9, N18.9   • Hypernatremia E87.0   • Dehydration E86.0   • Atrial fibrillation I48.91   • Nonsustained ventricular tachycardia I47.2   • Dementia F03.90   • Closed fracture of right hip S72.001A   • CAD (coronary artery disease) I25.10   • Hypertension I10   • COPD (chronic obstructive pulmonary disease) J44.9   • DM (diabetes mellitus), type 2 E11.9   • Dementia F03.90   • Atrial fibrillation I48.91   • History of DVT (deep vein thrombosis) Z86.718   • DNR (do not resuscitate) Z66   • Anticoagulated on Coumadin Z51.81, Z79.01   • Iron deficiency anemia D50.9   • CKD (chronic kidney disease), stage III N18.3   • Acute GI bleeding K92.2   • Hypokalemia E87.6   • S/P hip replacement, right Z96.641   • Right shoulder pain M25.511   • Acute blood loss anemia D62   • Hospice care Z51.5          Hospital Course: Darrain Davis  is a 77-year-old gentleman with history of severe dementia, previous CVA, coronary artery disease, hypertension, COPD, DVT, diabetes mellitus, atrial fibrillation, on Coumadin.  Patient was initially admitted to the hospital from  to  due to right hip fracture, he underwent hip  replacement on the October 10.  Apparently he was doing okay and was discharged to nursing home on .  He was very confused at the nursing home, however he was not aggressive, he was able to work with physical therapy.  The day of admission he was complaining of chest pain in the mid as well as the right side, very intense, as well as shortness of breath, reason for which he was sent to the emergency room.  In emergency room, patient was found to have black stools, heme-positive, his hemoglobin was 6.1, his INR was 2.26, his creatinine was 1.5, EKG and troponin were negative for acute current symptoms.  However due to this GI bleeding and severe anemia patient was admitted to the hospital for further management.      Upon admission, he was given blood, IV fluids. He remained very confused. Further work up revealed signs of colon cancer with suspicious metastatic LN. Patient's POA was explained regarding the options of treatment vs not treating and do comfort care. She decided not to treat the patient, she made patient DNR, and agreed to change his care to palliative care. She also changed his code status to DNR/AND. Patient was seen by hospice and was approved for HSB. He was discharged and readmitted to hospice as HSB.    Under hospice, patient continued with palliative care orders, he continued to deteriorate and  eventually  on 2017 at 00:20 hours.        Signed:  Zacarias Kulkarni MD

## 2021-08-25 NOTE — ED TRIAGE NOTES
Patient brought from [facility]. EMS reports that staff found his unresponsive and not breathing. States that they bagged the patient for 2 minutes but that the patient never lost pulses. Found pt to be hypotensive. Upon arrival patient is alert but disoriented. Patient incontinent of bowel and bladder PTA. Seen here several times recently for behavior disturbances associated with worsening dementia. Patient disoriented x4 but not combative at this time.   
No

## 2022-07-07 NOTE — NURSING NOTE
Called and spoke with Diana, patient's POA to relay message that patient's surgery will be later in the afternoon per Dr. Dickerson.    [Time Spent: ___ minutes] : I have spent [unfilled] minutes of time on the encounter.

## (undated) DEVICE — SYR CONTRL LUERLOK 10CC

## (undated) DEVICE — SUT VIC 0 CT1 36IN J946H

## (undated) DEVICE — PK HIP TOTL 40

## (undated) DEVICE — SUT ETHIB 0 CT1 CR8 18IN CX21D

## (undated) DEVICE — SOL ISO/ALC RUB 70PCT 4OZ

## (undated) DEVICE — EYE PAK* 1050 NON-WOVEN OPHTHALMIC DRAPE INCISE POUCHES: Brand: ALCON EYE-PAK

## (undated) DEVICE — PAD,ABDOMINAL,8"X10",ST,LF: Brand: MEDLINE

## (undated) DEVICE — GLV SURG BIOGEL LTX PF 8

## (undated) DEVICE — DRAPE,U/ SHT,SPLIT,PLAS,STERIL: Brand: MEDLINE

## (undated) DEVICE — HEWSON SUTURE RETRIEVER: Brand: HEWSON SUTURE RETRIEVER

## (undated) DEVICE — COAXIAL FEMORAL CANAL TIP

## (undated) DEVICE — GLV SURG BIOGEL LTX PF 6 1/2

## (undated) DEVICE — SKIN PREP TRAY W/CHG: Brand: MEDLINE INDUSTRIES, INC.

## (undated) DEVICE — GLV SURG TRIUMPH CLASSIC PF LTX 8 STRL

## (undated) DEVICE — CONTAINER,SPECIMEN,OR STERILE,4OZ: Brand: MEDLINE

## (undated) DEVICE — 1000 S-DRAPE TOWEL DRAPE 10/BX: Brand: STERI-DRAPE™

## (undated) DEVICE — MAT FLR ABSORBENT LG 4FT 10 2.5FT

## (undated) DEVICE — DECANT BG O JET

## (undated) DEVICE — GLV SURG TRIUMPH CLASSIC PF LTX 6.5 STRL

## (undated) DEVICE — DRAPE,REIN 53X77,STERILE: Brand: MEDLINE

## (undated) DEVICE — ANTIBACTERIAL UNDYED BRAIDED (POLYGLACTIN 910), SYNTHETIC ABSORBABLE SUTURE: Brand: COATED VICRYL

## (undated) DEVICE — CULT AER/ANAEROB FASTIDIOUS BACT

## (undated) DEVICE — POOLE SUCTION HANDLE: Brand: CARDINAL HEALTH

## (undated) DEVICE — SUT VIC 1 CT1 36IN J947H

## (undated) DEVICE — CEMENT MIXING SYSTEM WITH FEMORAL BREAKWAY NOZZLE: Brand: REVOLUTION

## (undated) DEVICE — HANDPIECE SET WITH COAXIAL HIGH FLOW TIP AND SUCTION TUBE: Brand: INTERPULSE

## (undated) DEVICE — ENCORE® LATEX ORTHO SIZE 6.5, STERILE LATEX POWDER-FREE SURGICAL GLOVE: Brand: ENCORE

## (undated) DEVICE — DRAPE,HIP,W/POUCHES,STERILE: Brand: MEDLINE

## (undated) DEVICE — TAPE MICROFM 2IN LF

## (undated) DEVICE — SPNG GZ WOVN 4X4IN 12PLY 10/BX STRL

## (undated) DEVICE — ENCORE® LATEX ORTHO SIZE 8, STERILE LATEX POWDER-FREE SURGICAL GLOVE: Brand: ENCORE

## (undated) DEVICE — INTENDED FOR TISSUE SEPARATION, AND OTHER PROCEDURES THAT REQUIRE A SHARP SURGICAL BLADE TO PUNCTURE OR CUT.: Brand: BARD-PARKER ® CARBON RIB-BACK BLADES

## (undated) DEVICE — CMT BONE SIMPLEX/P TMYCIN FDOS SGL: Type: IMPLANTABLE DEVICE | Site: HIP | Status: NON-FUNCTIONAL

## (undated) DEVICE — PROXIMATE RH ROTATING HEAD SKIN STAPLERS (35 WIDE) CONTAINS 35 STAINLESS STEEL STAPLES: Brand: PROXIMATE

## (undated) DEVICE — MARKR SKIN W/RULR AND LBL

## (undated) DEVICE — 3M™ STERI-DRAPE™ INCISE DRAPE 1040 (35CM X 35CM): Brand: STERI-DRAPE™

## (undated) DEVICE — APPL CHLORAPREP W/TINT 26ML ORNG

## (undated) DEVICE — DRSNG GZ CURAD XEROFORM NONADHR OVERWRAP 5X9IN